# Patient Record
Sex: MALE | Race: WHITE | NOT HISPANIC OR LATINO
[De-identification: names, ages, dates, MRNs, and addresses within clinical notes are randomized per-mention and may not be internally consistent; named-entity substitution may affect disease eponyms.]

---

## 2017-02-27 ENCOUNTER — FORM ENCOUNTER (OUTPATIENT)
Age: 77
End: 2017-02-27

## 2017-02-28 ENCOUNTER — OUTPATIENT (OUTPATIENT)
Dept: OUTPATIENT SERVICES | Facility: HOSPITAL | Age: 77
LOS: 1 days | End: 2017-02-28
Payer: MEDICARE

## 2017-02-28 ENCOUNTER — APPOINTMENT (OUTPATIENT)
Dept: CT IMAGING | Facility: CLINIC | Age: 77
End: 2017-02-28

## 2017-02-28 DIAGNOSIS — Z00.8 ENCOUNTER FOR OTHER GENERAL EXAMINATION: ICD-10-CM

## 2017-02-28 PROCEDURE — 71260 CT THORAX DX C+: CPT

## 2017-03-03 ENCOUNTER — APPOINTMENT (OUTPATIENT)
Dept: HEMATOLOGY ONCOLOGY | Facility: CLINIC | Age: 77
End: 2017-03-03

## 2017-03-03 VITALS
HEART RATE: 73 BPM | BODY MASS INDEX: 26.21 KG/M2 | SYSTOLIC BLOOD PRESSURE: 146 MMHG | HEIGHT: 67 IN | TEMPERATURE: 97.7 F | WEIGHT: 167 LBS | DIASTOLIC BLOOD PRESSURE: 68 MMHG

## 2017-03-27 ENCOUNTER — FORM ENCOUNTER (OUTPATIENT)
Age: 77
End: 2017-03-27

## 2017-03-28 ENCOUNTER — OUTPATIENT (OUTPATIENT)
Dept: OUTPATIENT SERVICES | Facility: HOSPITAL | Age: 77
LOS: 1 days | End: 2017-03-28
Payer: MEDICARE

## 2017-03-28 ENCOUNTER — APPOINTMENT (OUTPATIENT)
Dept: NUCLEAR MEDICINE | Facility: CLINIC | Age: 77
End: 2017-03-28

## 2017-03-28 DIAGNOSIS — Z00.8 ENCOUNTER FOR OTHER GENERAL EXAMINATION: ICD-10-CM

## 2017-03-28 PROCEDURE — A9552: CPT

## 2017-03-28 PROCEDURE — 78815 PET IMAGE W/CT SKULL-THIGH: CPT

## 2017-06-27 ENCOUNTER — APPOINTMENT (OUTPATIENT)
Dept: MRI IMAGING | Facility: CLINIC | Age: 77
End: 2017-06-27

## 2017-06-27 ENCOUNTER — OUTPATIENT (OUTPATIENT)
Dept: OUTPATIENT SERVICES | Facility: HOSPITAL | Age: 77
LOS: 1 days | End: 2017-06-27
Payer: MEDICARE

## 2017-06-27 ENCOUNTER — APPOINTMENT (OUTPATIENT)
Dept: CT IMAGING | Facility: CLINIC | Age: 77
End: 2017-06-27

## 2017-06-27 DIAGNOSIS — C15.9 MALIGNANT NEOPLASM OF ESOPHAGUS, UNSPECIFIED: ICD-10-CM

## 2017-06-27 PROCEDURE — 82565 ASSAY OF CREATININE: CPT

## 2017-06-27 PROCEDURE — 71260 CT THORAX DX C+: CPT

## 2017-06-27 PROCEDURE — A9585: CPT

## 2017-06-27 PROCEDURE — 74183 MRI ABD W/O CNTR FLWD CNTR: CPT

## 2017-08-15 ENCOUNTER — OUTPATIENT (OUTPATIENT)
Dept: OUTPATIENT SERVICES | Facility: HOSPITAL | Age: 77
LOS: 1 days | End: 2017-08-15
Payer: MEDICARE

## 2017-08-15 ENCOUNTER — APPOINTMENT (OUTPATIENT)
Dept: CT IMAGING | Facility: CLINIC | Age: 77
End: 2017-08-15
Payer: MEDICARE

## 2017-08-15 DIAGNOSIS — Z00.8 ENCOUNTER FOR OTHER GENERAL EXAMINATION: ICD-10-CM

## 2017-08-15 PROCEDURE — 71250 CT THORAX DX C-: CPT

## 2017-08-15 PROCEDURE — 71250 CT THORAX DX C-: CPT | Mod: 26

## 2017-09-13 ENCOUNTER — LABORATORY RESULT (OUTPATIENT)
Age: 77
End: 2017-09-13

## 2017-09-13 ENCOUNTER — APPOINTMENT (OUTPATIENT)
Dept: HEMATOLOGY ONCOLOGY | Facility: CLINIC | Age: 77
End: 2017-09-13
Payer: MEDICARE

## 2017-09-13 VITALS
HEART RATE: 74 BPM | BODY MASS INDEX: 26.06 KG/M2 | TEMPERATURE: 97.8 F | HEIGHT: 67 IN | SYSTOLIC BLOOD PRESSURE: 122 MMHG | DIASTOLIC BLOOD PRESSURE: 70 MMHG | WEIGHT: 166 LBS

## 2017-09-13 LAB
HCT VFR BLD CALC: 35.5 %
HGB BLD-MCNC: 13.2 G/DL
MCHC RBC-ENTMCNC: 34.9 PG
MCHC RBC-ENTMCNC: 37.1 GM/DL
MCV RBC AUTO: 94.2 FL
PLATELET # BLD AUTO: 235 K/UL
RBC # BLD: 3.77 M/UL
RBC # FLD: 11.4 %
WBC # FLD AUTO: 7.3 K/UL

## 2017-09-13 PROCEDURE — 36415 COLL VENOUS BLD VENIPUNCTURE: CPT

## 2017-09-13 PROCEDURE — 99215 OFFICE O/P EST HI 40 MIN: CPT | Mod: 25

## 2017-09-13 PROCEDURE — 85025 COMPLETE CBC W/AUTO DIFF WBC: CPT

## 2017-09-13 RX ORDER — OXYCODONE AND ACETAMINOPHEN 5; 325 MG/1; MG/1
5-325 TABLET ORAL
Qty: 20 | Refills: 0 | Status: COMPLETED | COMMUNITY
Start: 2017-09-07

## 2017-09-19 PROBLEM — Z92.3 STATUS POST CHEMORADIATION: Status: RESOLVED | Noted: 2017-09-19 | Resolved: 2017-09-19

## 2017-09-19 PROBLEM — Z92.21 HISTORY OF CHEMOTHERAPY: Status: RESOLVED | Noted: 2017-09-19 | Resolved: 2017-09-19

## 2017-09-20 ENCOUNTER — OUTPATIENT (OUTPATIENT)
Dept: OUTPATIENT SERVICES | Facility: HOSPITAL | Age: 77
LOS: 1 days | Discharge: HOME | End: 2017-09-20
Payer: MEDICARE

## 2017-09-20 VITALS
SYSTOLIC BLOOD PRESSURE: 128 MMHG | DIASTOLIC BLOOD PRESSURE: 69 MMHG | RESPIRATION RATE: 16 BRPM | HEART RATE: 89 BPM | OXYGEN SATURATION: 100 %

## 2017-09-20 VITALS — WEIGHT: 156.09 LBS | HEIGHT: 67 IN | TEMPERATURE: 97 F | OXYGEN SATURATION: 100 %

## 2017-09-20 DIAGNOSIS — S62.101A FRACTURE OF UNSPECIFIED CARPAL BONE, RIGHT WRIST, INITIAL ENCOUNTER FOR CLOSED FRACTURE: Chronic | ICD-10-CM

## 2017-09-20 DIAGNOSIS — Z98.41 CATARACT EXTRACTION STATUS, RIGHT EYE: Chronic | ICD-10-CM

## 2017-09-20 DIAGNOSIS — Z87.19 PERSONAL HISTORY OF OTHER DISEASES OF THE DIGESTIVE SYSTEM: Chronic | ICD-10-CM

## 2017-09-20 DIAGNOSIS — Z85.118 PERSONAL HISTORY OF OTHER MALIGNANT NEOPLASM OF BRONCHUS AND LUNG: Chronic | ICD-10-CM

## 2017-09-20 DIAGNOSIS — Z98.890 OTHER SPECIFIED POSTPROCEDURAL STATES: Chronic | ICD-10-CM

## 2017-09-20 DIAGNOSIS — Z90.49 ACQUIRED ABSENCE OF OTHER SPECIFIED PARTS OF DIGESTIVE TRACT: Chronic | ICD-10-CM

## 2017-09-20 DIAGNOSIS — Z98.1 ARTHRODESIS STATUS: Chronic | ICD-10-CM

## 2017-09-20 DIAGNOSIS — Z90.89 ACQUIRED ABSENCE OF OTHER ORGANS: Chronic | ICD-10-CM

## 2017-09-20 LAB
ANION GAP SERPL CALC-SCNC: 4 MMOL/L — LOW (ref 5–17)
BUN SERPL-MCNC: 20 MG/DL — SIGNIFICANT CHANGE UP (ref 7–23)
CALCIUM SERPL-MCNC: 9.1 MG/DL — SIGNIFICANT CHANGE UP (ref 8.5–10.1)
CHLORIDE SERPL-SCNC: 106 MMOL/L — SIGNIFICANT CHANGE UP (ref 96–108)
CO2 SERPL-SCNC: 30 MMOL/L — SIGNIFICANT CHANGE UP (ref 22–31)
CREAT SERPL-MCNC: 0.92 MG/DL — SIGNIFICANT CHANGE UP (ref 0.5–1.3)
GLUCOSE SERPL-MCNC: 115 MG/DL — HIGH (ref 70–99)
INR BLD: 1.02 RATIO — SIGNIFICANT CHANGE UP (ref 0.88–1.16)
POTASSIUM SERPL-MCNC: 4.7 MMOL/L — SIGNIFICANT CHANGE UP (ref 3.5–5.3)
POTASSIUM SERPL-SCNC: 4.7 MMOL/L — SIGNIFICANT CHANGE UP (ref 3.5–5.3)
PROTHROM AB SERPL-ACNC: 11 SEC — SIGNIFICANT CHANGE UP (ref 9.8–12.7)
SODIUM SERPL-SCNC: 140 MMOL/L — SIGNIFICANT CHANGE UP (ref 135–145)

## 2017-09-20 PROCEDURE — 76937 US GUIDE VASCULAR ACCESS: CPT | Mod: 26

## 2017-09-20 PROCEDURE — 93010 ELECTROCARDIOGRAM REPORT: CPT

## 2017-09-20 PROCEDURE — 77001 FLUOROGUIDE FOR VEIN DEVICE: CPT | Mod: 26

## 2017-09-20 PROCEDURE — 36561 INSERT TUNNELED CV CATH: CPT | Mod: RT

## 2017-09-20 RX ORDER — FENTANYL CITRATE 50 UG/ML
25 INJECTION INTRAVENOUS
Qty: 0 | Refills: 0 | Status: DISCONTINUED | OUTPATIENT
Start: 2017-09-20 | End: 2017-09-20

## 2017-09-20 RX ORDER — ONDANSETRON 8 MG/1
4 TABLET, FILM COATED ORAL ONCE
Qty: 0 | Refills: 0 | Status: DISCONTINUED | OUTPATIENT
Start: 2017-09-20 | End: 2017-09-20

## 2017-09-20 RX ORDER — OXYCODONE AND ACETAMINOPHEN 5; 325 MG/1; MG/1
1 TABLET ORAL EVERY 4 HOURS
Qty: 0 | Refills: 0 | Status: DISCONTINUED | OUTPATIENT
Start: 2017-09-20 | End: 2017-09-20

## 2017-09-20 RX ORDER — SODIUM CHLORIDE 9 MG/ML
1000 INJECTION INTRAMUSCULAR; INTRAVENOUS; SUBCUTANEOUS
Qty: 0 | Refills: 0 | Status: DISCONTINUED | OUTPATIENT
Start: 2017-09-20 | End: 2017-09-20

## 2017-09-20 NOTE — ASU PATIENT PROFILE, ADULT - PMH
BPH (benign prostatic hyperplasia)    Esophageal carcinoma    GERD (gastroesophageal reflux disease)    HTN (hypertension)    Lung cancer

## 2017-09-20 NOTE — BRIEF OPERATIVE NOTE - OPERATION/FINDINGS
sono and fluoro. rij access. 6fr single lumen port placed with tip in svc. good blood return. no complications.

## 2017-09-20 NOTE — ASU PATIENT PROFILE, ADULT - PSH
H/O esophagectomy    H/O ventral hernia  umbilical  H/O: lung cancer  lung resection  History of bilateral cataract extraction    History of fusion of thoracic spine    History of shoulder surgery  bilateral rotator cuff  S/P tonsillectomy and adenoidectomy    Wrist fracture, bilateral

## 2017-09-20 NOTE — BRIEF OPERATIVE NOTE - PROCEDURE
<<-----Click on this checkbox to enter Procedure Insertion of catheter with port  09/20/2017    Active  [id number]

## 2017-09-25 ENCOUNTER — APPOINTMENT (OUTPATIENT)
Dept: HEMATOLOGY ONCOLOGY | Facility: CLINIC | Age: 77
End: 2017-09-25
Payer: MEDICARE

## 2017-09-25 ENCOUNTER — APPOINTMENT (OUTPATIENT)
Dept: INFUSION THERAPY | Facility: CLINIC | Age: 77
End: 2017-09-25
Payer: MEDICARE

## 2017-09-25 ENCOUNTER — LABORATORY RESULT (OUTPATIENT)
Age: 77
End: 2017-09-25

## 2017-09-25 VITALS
HEIGHT: 67 IN | SYSTOLIC BLOOD PRESSURE: 112 MMHG | WEIGHT: 166 LBS | TEMPERATURE: 98.1 F | DIASTOLIC BLOOD PRESSURE: 69 MMHG | BODY MASS INDEX: 26.06 KG/M2 | HEART RATE: 81 BPM

## 2017-09-25 DIAGNOSIS — Z92.21 PERSONAL HISTORY OF IRRADIATION: ICD-10-CM

## 2017-09-25 DIAGNOSIS — Z92.3 PERSONAL HISTORY OF IRRADIATION: ICD-10-CM

## 2017-09-25 DIAGNOSIS — Z92.21 PERSONAL HISTORY OF ANTINEOPLASTIC CHEMOTHERAPY: ICD-10-CM

## 2017-09-25 LAB
HCT VFR BLD CALC: 35.8 %
HGB BLD-MCNC: 12.9 G/DL
MCHC RBC-ENTMCNC: 33.5 PG
MCHC RBC-ENTMCNC: 36.1 GM/DL
MCV RBC AUTO: 92.6 FL
PLATELET # BLD AUTO: 248 K/UL
RBC # BLD: 3.86 M/UL
RBC # FLD: 11.5 %
WBC # FLD AUTO: 6.9 K/UL

## 2017-09-25 PROCEDURE — 36415 COLL VENOUS BLD VENIPUNCTURE: CPT

## 2017-09-25 PROCEDURE — 96415 CHEMO IV INFUSION ADDL HR: CPT

## 2017-09-25 PROCEDURE — 99215 OFFICE O/P EST HI 40 MIN: CPT | Mod: 25

## 2017-09-25 PROCEDURE — 96367 TX/PROPH/DG ADDL SEQ IV INF: CPT

## 2017-09-25 PROCEDURE — 96375 TX/PRO/DX INJ NEW DRUG ADDON: CPT

## 2017-09-25 PROCEDURE — 85025 COMPLETE CBC W/AUTO DIFF WBC: CPT

## 2017-09-25 PROCEDURE — 96372 THER/PROPH/DIAG INJ SC/IM: CPT | Mod: 59

## 2017-09-25 PROCEDURE — 96413 CHEMO IV INFUSION 1 HR: CPT

## 2017-09-29 LAB
ALBUMIN SERPL ELPH-MCNC: 3.9 G/DL
ALP BLD-CCNC: 87 U/L
ALT SERPL-CCNC: 14 U/L
ANION GAP SERPL CALC-SCNC: 13 MMOL/L
AST SERPL-CCNC: 18 U/L
BILIRUB SERPL-MCNC: 0.4 MG/DL
BUN SERPL-MCNC: 18 MG/DL
CALCIUM SERPL-MCNC: 9.2 MG/DL
CHLORIDE SERPL-SCNC: 101 MMOL/L
CO2 SERPL-SCNC: 25 MMOL/L
CREAT SERPL-MCNC: 0.93 MG/DL
FERRITIN SERPL-MCNC: 103 NG/ML
FOLATE SERPL-MCNC: 11.3 NG/ML
GLUCOSE SERPL-MCNC: 146 MG/DL
IRON SATN MFR SERPL: 31 %
IRON SERPL-MCNC: 80 UG/DL
POTASSIUM SERPL-SCNC: 4.9 MMOL/L
PROT SERPL-MCNC: 6.6 G/DL
SODIUM SERPL-SCNC: 139 MMOL/L
TIBC SERPL-MCNC: 257 UG/DL
UIBC SERPL-MCNC: 177 UG/DL
VIT B12 SERPL-MCNC: 404 PG/ML

## 2017-09-30 DIAGNOSIS — I48.0 PAROXYSMAL ATRIAL FIBRILLATION: ICD-10-CM

## 2017-09-30 DIAGNOSIS — N40.0 BENIGN PROSTATIC HYPERPLASIA WITHOUT LOWER URINARY TRACT SYMPTOMS: ICD-10-CM

## 2017-09-30 DIAGNOSIS — C15.9 MALIGNANT NEOPLASM OF ESOPHAGUS, UNSPECIFIED: ICD-10-CM

## 2017-09-30 DIAGNOSIS — I10 ESSENTIAL (PRIMARY) HYPERTENSION: ICD-10-CM

## 2017-09-30 DIAGNOSIS — K21.9 GASTRO-ESOPHAGEAL REFLUX DISEASE WITHOUT ESOPHAGITIS: ICD-10-CM

## 2017-09-30 DIAGNOSIS — Z87.891 PERSONAL HISTORY OF NICOTINE DEPENDENCE: ICD-10-CM

## 2017-10-10 ENCOUNTER — APPOINTMENT (OUTPATIENT)
Dept: HEMATOLOGY ONCOLOGY | Facility: CLINIC | Age: 77
End: 2017-10-10
Payer: MEDICARE

## 2017-10-10 ENCOUNTER — LABORATORY RESULT (OUTPATIENT)
Age: 77
End: 2017-10-10

## 2017-10-10 ENCOUNTER — APPOINTMENT (OUTPATIENT)
Dept: INFUSION THERAPY | Facility: CLINIC | Age: 77
End: 2017-10-10
Payer: MEDICARE

## 2017-10-10 VITALS
DIASTOLIC BLOOD PRESSURE: 70 MMHG | BODY MASS INDEX: 26.37 KG/M2 | TEMPERATURE: 97.9 F | HEART RATE: 78 BPM | SYSTOLIC BLOOD PRESSURE: 123 MMHG | WEIGHT: 168 LBS | HEIGHT: 67 IN

## 2017-10-10 LAB
HCT VFR BLD CALC: 34.2 %
HGB BLD-MCNC: 12.5 G/DL
MCHC RBC-ENTMCNC: 33.9 PG
MCHC RBC-ENTMCNC: 36.5 GM/DL
MCV RBC AUTO: 93 FL
PLATELET # BLD AUTO: 184 K/UL
RBC # BLD: 3.68 M/UL
RBC # FLD: 11.9 %
WBC # FLD AUTO: 6 K/UL

## 2017-10-10 PROCEDURE — 96413 CHEMO IV INFUSION 1 HR: CPT

## 2017-10-10 PROCEDURE — 85025 COMPLETE CBC W/AUTO DIFF WBC: CPT

## 2017-10-10 PROCEDURE — 96375 TX/PRO/DX INJ NEW DRUG ADDON: CPT

## 2017-10-10 PROCEDURE — 36415 COLL VENOUS BLD VENIPUNCTURE: CPT

## 2017-10-10 PROCEDURE — 96415 CHEMO IV INFUSION ADDL HR: CPT

## 2017-10-10 PROCEDURE — 96367 TX/PROPH/DG ADDL SEQ IV INF: CPT

## 2017-10-10 PROCEDURE — 99213 OFFICE O/P EST LOW 20 MIN: CPT | Mod: 25

## 2017-10-11 LAB
ALBUMIN SERPL ELPH-MCNC: 3.9 G/DL
ALP BLD-CCNC: 72 U/L
ALT SERPL-CCNC: 10 U/L
ANION GAP SERPL CALC-SCNC: 17 MMOL/L
AST SERPL-CCNC: 17 U/L
BILIRUB SERPL-MCNC: 0.2 MG/DL
BUN SERPL-MCNC: 20 MG/DL
CALCIUM SERPL-MCNC: 9.1 MG/DL
CHLORIDE SERPL-SCNC: 104 MMOL/L
CO2 SERPL-SCNC: 20 MMOL/L
CREAT SERPL-MCNC: 0.94 MG/DL
GLUCOSE SERPL-MCNC: 189 MG/DL
POTASSIUM SERPL-SCNC: 3.9 MMOL/L
PROT SERPL-MCNC: 6.1 G/DL
SODIUM SERPL-SCNC: 141 MMOL/L

## 2017-10-23 ENCOUNTER — LABORATORY RESULT (OUTPATIENT)
Age: 77
End: 2017-10-23

## 2017-10-23 ENCOUNTER — APPOINTMENT (OUTPATIENT)
Dept: INFUSION THERAPY | Facility: CLINIC | Age: 77
End: 2017-10-23
Payer: MEDICARE

## 2017-10-23 ENCOUNTER — APPOINTMENT (OUTPATIENT)
Dept: HEMATOLOGY ONCOLOGY | Facility: CLINIC | Age: 77
End: 2017-10-23
Payer: MEDICARE

## 2017-10-23 VITALS
SYSTOLIC BLOOD PRESSURE: 142 MMHG | TEMPERATURE: 97.5 F | HEART RATE: 69 BPM | BODY MASS INDEX: 26.37 KG/M2 | HEIGHT: 67 IN | WEIGHT: 168 LBS | DIASTOLIC BLOOD PRESSURE: 79 MMHG

## 2017-10-23 LAB
HCT VFR BLD CALC: 30 %
HGB BLD-MCNC: 11.3 G/DL
MCHC RBC-ENTMCNC: 35.2 PG
MCHC RBC-ENTMCNC: 37.6 GM/DL
MCV RBC AUTO: 93.5 FL
PLATELET # BLD AUTO: 146 K/UL
RBC # BLD: 3.21 M/UL
RBC # FLD: 12.9 %
WBC # FLD AUTO: 5.3 K/UL

## 2017-10-23 PROCEDURE — 96375 TX/PRO/DX INJ NEW DRUG ADDON: CPT

## 2017-10-23 PROCEDURE — 36415 COLL VENOUS BLD VENIPUNCTURE: CPT

## 2017-10-23 PROCEDURE — 96415 CHEMO IV INFUSION ADDL HR: CPT

## 2017-10-23 PROCEDURE — 96372 THER/PROPH/DIAG INJ SC/IM: CPT | Mod: 59

## 2017-10-23 PROCEDURE — 96367 TX/PROPH/DG ADDL SEQ IV INF: CPT

## 2017-10-23 PROCEDURE — 99214 OFFICE O/P EST MOD 30 MIN: CPT | Mod: 25

## 2017-10-23 PROCEDURE — 85025 COMPLETE CBC W/AUTO DIFF WBC: CPT

## 2017-10-23 PROCEDURE — 96413 CHEMO IV INFUSION 1 HR: CPT

## 2017-10-24 LAB — CEA SERPL-MCNC: 2.3 NG/ML

## 2017-11-04 ENCOUNTER — APPOINTMENT (OUTPATIENT)
Dept: ULTRASOUND IMAGING | Facility: CLINIC | Age: 77
End: 2017-11-04

## 2017-11-08 ENCOUNTER — APPOINTMENT (OUTPATIENT)
Dept: INFUSION THERAPY | Facility: CLINIC | Age: 77
End: 2017-11-08
Payer: MEDICARE

## 2017-11-08 ENCOUNTER — LABORATORY RESULT (OUTPATIENT)
Age: 77
End: 2017-11-08

## 2017-11-08 VITALS
HEIGHT: 67 IN | HEART RATE: 77 BPM | DIASTOLIC BLOOD PRESSURE: 66 MMHG | TEMPERATURE: 98 F | SYSTOLIC BLOOD PRESSURE: 119 MMHG | WEIGHT: 163.5 LBS | BODY MASS INDEX: 25.66 KG/M2

## 2017-11-08 LAB
HCT VFR BLD CALC: 33.1 %
HGB BLD-MCNC: 12 G/DL
MCHC RBC-ENTMCNC: 35.3 PG
MCHC RBC-ENTMCNC: 36.2 GM/DL
MCV RBC AUTO: 97.4 FL
PLATELET # BLD AUTO: 129 K/UL
RBC # BLD: 3.4 M/UL
RBC # FLD: 15.3 %
WBC # FLD AUTO: 3.7 K/UL

## 2017-11-08 PROCEDURE — 96367 TX/PROPH/DG ADDL SEQ IV INF: CPT

## 2017-11-08 PROCEDURE — 96415 CHEMO IV INFUSION ADDL HR: CPT

## 2017-11-08 PROCEDURE — 96375 TX/PRO/DX INJ NEW DRUG ADDON: CPT

## 2017-11-08 PROCEDURE — 85025 COMPLETE CBC W/AUTO DIFF WBC: CPT

## 2017-11-08 PROCEDURE — 36415 COLL VENOUS BLD VENIPUNCTURE: CPT

## 2017-11-08 PROCEDURE — 96413 CHEMO IV INFUSION 1 HR: CPT

## 2017-11-09 LAB
ALBUMIN SERPL ELPH-MCNC: 4 G/DL
ALP BLD-CCNC: 71 U/L
ALT SERPL-CCNC: 19 U/L
ANION GAP SERPL CALC-SCNC: 16 MMOL/L
AST SERPL-CCNC: 27 U/L
BILIRUB SERPL-MCNC: 0.3 MG/DL
BUN SERPL-MCNC: 17 MG/DL
CALCIUM SERPL-MCNC: 8.9 MG/DL
CHLORIDE SERPL-SCNC: 102 MMOL/L
CO2 SERPL-SCNC: 23 MMOL/L
CREAT SERPL-MCNC: 0.84 MG/DL
GLUCOSE SERPL-MCNC: 164 MG/DL
POTASSIUM SERPL-SCNC: 4.6 MMOL/L
PROT SERPL-MCNC: 6.5 G/DL
SODIUM SERPL-SCNC: 141 MMOL/L

## 2017-11-13 ENCOUNTER — APPOINTMENT (OUTPATIENT)
Dept: ULTRASOUND IMAGING | Facility: CLINIC | Age: 77
End: 2017-11-13
Payer: MEDICARE

## 2017-11-13 ENCOUNTER — OUTPATIENT (OUTPATIENT)
Dept: OUTPATIENT SERVICES | Facility: HOSPITAL | Age: 77
LOS: 1 days | End: 2017-11-13
Payer: MEDICARE

## 2017-11-13 DIAGNOSIS — Z90.89 ACQUIRED ABSENCE OF OTHER ORGANS: Chronic | ICD-10-CM

## 2017-11-13 DIAGNOSIS — Z90.49 ACQUIRED ABSENCE OF OTHER SPECIFIED PARTS OF DIGESTIVE TRACT: Chronic | ICD-10-CM

## 2017-11-13 DIAGNOSIS — Z98.1 ARTHRODESIS STATUS: Chronic | ICD-10-CM

## 2017-11-13 DIAGNOSIS — Z98.890 OTHER SPECIFIED POSTPROCEDURAL STATES: Chronic | ICD-10-CM

## 2017-11-13 DIAGNOSIS — Z00.8 ENCOUNTER FOR OTHER GENERAL EXAMINATION: ICD-10-CM

## 2017-11-13 DIAGNOSIS — Z87.19 PERSONAL HISTORY OF OTHER DISEASES OF THE DIGESTIVE SYSTEM: Chronic | ICD-10-CM

## 2017-11-13 DIAGNOSIS — S62.101A FRACTURE OF UNSPECIFIED CARPAL BONE, RIGHT WRIST, INITIAL ENCOUNTER FOR CLOSED FRACTURE: Chronic | ICD-10-CM

## 2017-11-13 DIAGNOSIS — Z98.41 CATARACT EXTRACTION STATUS, RIGHT EYE: Chronic | ICD-10-CM

## 2017-11-13 DIAGNOSIS — Z85.118 PERSONAL HISTORY OF OTHER MALIGNANT NEOPLASM OF BRONCHUS AND LUNG: Chronic | ICD-10-CM

## 2017-11-13 PROCEDURE — 76536 US EXAM OF HEAD AND NECK: CPT

## 2017-11-13 PROCEDURE — 76536 US EXAM OF HEAD AND NECK: CPT | Mod: 26

## 2017-11-21 ENCOUNTER — APPOINTMENT (OUTPATIENT)
Dept: HEMATOLOGY ONCOLOGY | Facility: CLINIC | Age: 77
End: 2017-11-21
Payer: MEDICARE

## 2017-11-21 ENCOUNTER — APPOINTMENT (OUTPATIENT)
Dept: INFUSION THERAPY | Facility: CLINIC | Age: 77
End: 2017-11-21
Payer: MEDICARE

## 2017-11-21 ENCOUNTER — LABORATORY RESULT (OUTPATIENT)
Age: 77
End: 2017-11-21

## 2017-11-21 VITALS
HEIGHT: 67 IN | DIASTOLIC BLOOD PRESSURE: 87 MMHG | WEIGHT: 167 LBS | HEART RATE: 74 BPM | TEMPERATURE: 97.8 F | SYSTOLIC BLOOD PRESSURE: 156 MMHG | BODY MASS INDEX: 26.21 KG/M2

## 2017-11-21 LAB
HCT VFR BLD CALC: 31.8 %
HGB BLD-MCNC: 11.4 G/DL
MCHC RBC-ENTMCNC: 35.7 GM/DL
MCHC RBC-ENTMCNC: 35.7 PG
MCV RBC AUTO: 99.9 FL
PLATELET # BLD AUTO: 129 K/UL
RBC # BLD: 3.19 M/UL
RBC # FLD: 15.7 %
WBC # FLD AUTO: 3.9 K/UL

## 2017-11-21 PROCEDURE — 96367 TX/PROPH/DG ADDL SEQ IV INF: CPT

## 2017-11-21 PROCEDURE — 96415 CHEMO IV INFUSION ADDL HR: CPT

## 2017-11-21 PROCEDURE — 85025 COMPLETE CBC W/AUTO DIFF WBC: CPT

## 2017-11-21 PROCEDURE — 96375 TX/PRO/DX INJ NEW DRUG ADDON: CPT

## 2017-11-21 PROCEDURE — 36415 COLL VENOUS BLD VENIPUNCTURE: CPT

## 2017-11-21 PROCEDURE — 96372 THER/PROPH/DIAG INJ SC/IM: CPT | Mod: 59

## 2017-11-21 PROCEDURE — 99214 OFFICE O/P EST MOD 30 MIN: CPT | Mod: 25

## 2017-11-21 PROCEDURE — 96413 CHEMO IV INFUSION 1 HR: CPT

## 2017-12-05 ENCOUNTER — APPOINTMENT (OUTPATIENT)
Dept: INFUSION THERAPY | Facility: CLINIC | Age: 77
End: 2017-12-05
Payer: MEDICARE

## 2017-12-05 ENCOUNTER — LABORATORY RESULT (OUTPATIENT)
Age: 77
End: 2017-12-05

## 2017-12-05 VITALS
SYSTOLIC BLOOD PRESSURE: 123 MMHG | TEMPERATURE: 97.9 F | BODY MASS INDEX: 26.06 KG/M2 | DIASTOLIC BLOOD PRESSURE: 68 MMHG | HEIGHT: 67 IN | WEIGHT: 166 LBS | HEART RATE: 80 BPM

## 2017-12-05 LAB
HCT VFR BLD CALC: 32.2 %
HGB BLD-MCNC: 11.8 G/DL
MCHC RBC-ENTMCNC: 36.6 GM/DL
MCHC RBC-ENTMCNC: 36.8 PG
MCV RBC AUTO: 101 FL
PLATELET # BLD AUTO: 116 K/UL
RBC # BLD: 3.21 M/UL
RBC # FLD: 17 %
WBC # FLD AUTO: 3.6 K/UL

## 2017-12-05 PROCEDURE — 36415 COLL VENOUS BLD VENIPUNCTURE: CPT

## 2017-12-05 PROCEDURE — 96365 THER/PROPH/DIAG IV INF INIT: CPT

## 2017-12-05 PROCEDURE — 85025 COMPLETE CBC W/AUTO DIFF WBC: CPT

## 2017-12-05 PROCEDURE — 96375 TX/PRO/DX INJ NEW DRUG ADDON: CPT

## 2017-12-07 LAB
ALBUMIN SERPL ELPH-MCNC: 4 G/DL
ALP BLD-CCNC: 75 U/L
ALT SERPL-CCNC: 22 U/L
ANION GAP SERPL CALC-SCNC: 12 MMOL/L
AST SERPL-CCNC: 31 U/L
BILIRUB SERPL-MCNC: 0.2 MG/DL
BUN SERPL-MCNC: 19 MG/DL
CALCIUM SERPL-MCNC: 9 MG/DL
CHLORIDE SERPL-SCNC: 102 MMOL/L
CO2 SERPL-SCNC: 25 MMOL/L
CREAT SERPL-MCNC: 0.8 MG/DL
GLUCOSE SERPL-MCNC: 106 MG/DL
POTASSIUM SERPL-SCNC: 4.5 MMOL/L
PROT SERPL-MCNC: 6.5 G/DL
SODIUM SERPL-SCNC: 139 MMOL/L

## 2017-12-19 ENCOUNTER — APPOINTMENT (OUTPATIENT)
Dept: INFUSION THERAPY | Facility: CLINIC | Age: 77
End: 2017-12-19

## 2018-01-02 ENCOUNTER — APPOINTMENT (OUTPATIENT)
Dept: HEMATOLOGY ONCOLOGY | Facility: CLINIC | Age: 78
End: 2018-01-02
Payer: MEDICARE

## 2018-01-02 ENCOUNTER — APPOINTMENT (OUTPATIENT)
Dept: INFUSION THERAPY | Facility: CLINIC | Age: 78
End: 2018-01-02

## 2018-01-02 ENCOUNTER — LABORATORY RESULT (OUTPATIENT)
Age: 78
End: 2018-01-02

## 2018-01-02 VITALS
HEART RATE: 73 BPM | WEIGHT: 164 LBS | HEIGHT: 67 IN | DIASTOLIC BLOOD PRESSURE: 69 MMHG | TEMPERATURE: 97.8 F | SYSTOLIC BLOOD PRESSURE: 127 MMHG | BODY MASS INDEX: 25.74 KG/M2

## 2018-01-02 LAB
HCT VFR BLD CALC: 35 %
HGB BLD-MCNC: 12.4 G/DL
MCHC RBC-ENTMCNC: 35.3 GM/DL
MCHC RBC-ENTMCNC: 38.1 PG
MCV RBC AUTO: 108 FL
PLATELET # BLD AUTO: 161 K/UL
RBC # BLD: 3.24 M/UL
RBC # FLD: 16.9 %
WBC # FLD AUTO: 4 K/UL

## 2018-01-02 PROCEDURE — 36415 COLL VENOUS BLD VENIPUNCTURE: CPT

## 2018-01-02 PROCEDURE — 99214 OFFICE O/P EST MOD 30 MIN: CPT | Mod: 25

## 2018-01-02 PROCEDURE — 85025 COMPLETE CBC W/AUTO DIFF WBC: CPT

## 2018-01-03 LAB
ALBUMIN SERPL ELPH-MCNC: 4.3 G/DL
ALP BLD-CCNC: 83 U/L
ALT SERPL-CCNC: 17 U/L
ANION GAP SERPL CALC-SCNC: 14 MMOL/L
AST SERPL-CCNC: 26 U/L
BILIRUB SERPL-MCNC: 0.4 MG/DL
BUN SERPL-MCNC: 20 MG/DL
CALCIUM SERPL-MCNC: 9.1 MG/DL
CHLORIDE SERPL-SCNC: 105 MMOL/L
CO2 SERPL-SCNC: 22 MMOL/L
CREAT SERPL-MCNC: 0.91 MG/DL
GLUCOSE SERPL-MCNC: 114 MG/DL
POTASSIUM SERPL-SCNC: 4.1 MMOL/L
PROT SERPL-MCNC: 6.5 G/DL
SODIUM SERPL-SCNC: 141 MMOL/L

## 2018-02-25 ENCOUNTER — FORM ENCOUNTER (OUTPATIENT)
Age: 78
End: 2018-02-25

## 2018-02-26 ENCOUNTER — APPOINTMENT (OUTPATIENT)
Dept: CT IMAGING | Facility: CLINIC | Age: 78
End: 2018-02-26
Payer: MEDICARE

## 2018-02-26 ENCOUNTER — OUTPATIENT (OUTPATIENT)
Dept: OUTPATIENT SERVICES | Facility: HOSPITAL | Age: 78
LOS: 1 days | End: 2018-02-26
Payer: MEDICARE

## 2018-02-26 DIAGNOSIS — Z87.19 PERSONAL HISTORY OF OTHER DISEASES OF THE DIGESTIVE SYSTEM: Chronic | ICD-10-CM

## 2018-02-26 DIAGNOSIS — Z98.1 ARTHRODESIS STATUS: Chronic | ICD-10-CM

## 2018-02-26 DIAGNOSIS — S62.101A FRACTURE OF UNSPECIFIED CARPAL BONE, RIGHT WRIST, INITIAL ENCOUNTER FOR CLOSED FRACTURE: Chronic | ICD-10-CM

## 2018-02-26 DIAGNOSIS — Z90.89 ACQUIRED ABSENCE OF OTHER ORGANS: Chronic | ICD-10-CM

## 2018-02-26 DIAGNOSIS — Z85.118 PERSONAL HISTORY OF OTHER MALIGNANT NEOPLASM OF BRONCHUS AND LUNG: Chronic | ICD-10-CM

## 2018-02-26 DIAGNOSIS — Z00.8 ENCOUNTER FOR OTHER GENERAL EXAMINATION: ICD-10-CM

## 2018-02-26 DIAGNOSIS — Z90.49 ACQUIRED ABSENCE OF OTHER SPECIFIED PARTS OF DIGESTIVE TRACT: Chronic | ICD-10-CM

## 2018-02-26 DIAGNOSIS — Z98.890 OTHER SPECIFIED POSTPROCEDURAL STATES: Chronic | ICD-10-CM

## 2018-02-26 DIAGNOSIS — Z98.41 CATARACT EXTRACTION STATUS, RIGHT EYE: Chronic | ICD-10-CM

## 2018-02-26 PROCEDURE — 71260 CT THORAX DX C+: CPT

## 2018-02-26 PROCEDURE — 74177 CT ABD & PELVIS W/CONTRAST: CPT

## 2018-02-26 PROCEDURE — 71260 CT THORAX DX C+: CPT | Mod: 26

## 2018-02-26 PROCEDURE — 74177 CT ABD & PELVIS W/CONTRAST: CPT | Mod: 26

## 2018-02-26 PROCEDURE — 82565 ASSAY OF CREATININE: CPT

## 2018-03-02 ENCOUNTER — LABORATORY RESULT (OUTPATIENT)
Age: 78
End: 2018-03-02

## 2018-03-02 ENCOUNTER — APPOINTMENT (OUTPATIENT)
Dept: INFUSION THERAPY | Facility: CLINIC | Age: 78
End: 2018-03-02
Payer: MEDICARE

## 2018-03-02 ENCOUNTER — APPOINTMENT (OUTPATIENT)
Dept: HEMATOLOGY ONCOLOGY | Facility: CLINIC | Age: 78
End: 2018-03-02
Payer: MEDICARE

## 2018-03-02 VITALS
TEMPERATURE: 97.4 F | DIASTOLIC BLOOD PRESSURE: 75 MMHG | HEIGHT: 67 IN | BODY MASS INDEX: 24.17 KG/M2 | WEIGHT: 154 LBS | HEART RATE: 59 BPM | SYSTOLIC BLOOD PRESSURE: 130 MMHG

## 2018-03-02 DIAGNOSIS — Z79.899 OTHER LONG TERM (CURRENT) DRUG THERAPY: ICD-10-CM

## 2018-03-02 LAB
ALBUMIN SERPL ELPH-MCNC: 4.6 G/DL
ALP BLD-CCNC: 79 U/L
ALT SERPL-CCNC: 12 U/L
ANION GAP SERPL CALC-SCNC: 12 MMOL/L
AST SERPL-CCNC: 24 U/L
BILIRUB SERPL-MCNC: 0.5 MG/DL
BUN SERPL-MCNC: 24 MG/DL
CALCIUM SERPL-MCNC: 10 MG/DL
CHLORIDE SERPL-SCNC: 100 MMOL/L
CO2 SERPL-SCNC: 27 MMOL/L
CREAT SERPL-MCNC: 1.1 MG/DL
GLUCOSE SERPL-MCNC: 125 MG/DL
HCT VFR BLD CALC: 37.7 %
HGB BLD-MCNC: 13.2 G/DL
MCHC RBC-ENTMCNC: 35 GM/DL
MCHC RBC-ENTMCNC: 39.1 PG
MCV RBC AUTO: 112 FL
PLATELET # BLD AUTO: 145 K/UL
POTASSIUM SERPL-SCNC: 5.4 MMOL/L
PROT SERPL-MCNC: 7.1 G/DL
RBC # BLD: 3.38 M/UL
RBC # FLD: 10.7 %
SODIUM SERPL-SCNC: 139 MMOL/L
WBC # FLD AUTO: 5 K/UL

## 2018-03-02 PROCEDURE — 99213 OFFICE O/P EST LOW 20 MIN: CPT | Mod: 25

## 2018-03-02 PROCEDURE — 36415 COLL VENOUS BLD VENIPUNCTURE: CPT

## 2018-03-02 PROCEDURE — 85025 COMPLETE CBC W/AUTO DIFF WBC: CPT

## 2018-03-02 RX ORDER — SODIUM FLUORIDE 6 MG/ML
1.1 PASTE, DENTIFRICE DENTAL
Qty: 100 | Refills: 0 | Status: DISCONTINUED | COMMUNITY
Start: 2017-11-21

## 2018-03-02 RX ORDER — MUPIROCIN 20 MG/G
2 OINTMENT TOPICAL
Qty: 22 | Refills: 0 | Status: DISCONTINUED | COMMUNITY
Start: 2017-11-30

## 2018-03-02 RX ORDER — CAPECITABINE 500 MG/1
500 TABLET ORAL
Qty: 240 | Refills: 1 | Status: DISCONTINUED | COMMUNITY
Start: 2017-09-13 | End: 2018-03-02

## 2018-03-02 RX ORDER — SODIUM SULFATE, POTASSIUM SULFATE, MAGNESIUM SULFATE 17.5; 3.13; 1.6 G/ML; G/ML; G/ML
17.5-3.13-1.6 SOLUTION, CONCENTRATE ORAL
Qty: 354 | Refills: 0 | Status: DISCONTINUED | COMMUNITY
Start: 2017-12-14

## 2018-03-02 RX ORDER — AMOXICILLIN 500 MG/1
500 CAPSULE ORAL
Qty: 28 | Refills: 0 | Status: DISCONTINUED | COMMUNITY
Start: 2017-12-07

## 2018-03-23 ENCOUNTER — RESULT REVIEW (OUTPATIENT)
Age: 78
End: 2018-03-23

## 2018-05-04 ENCOUNTER — APPOINTMENT (OUTPATIENT)
Dept: HEMATOLOGY ONCOLOGY | Facility: CLINIC | Age: 78
End: 2018-05-04
Payer: MEDICARE

## 2018-05-04 ENCOUNTER — APPOINTMENT (OUTPATIENT)
Dept: INFUSION THERAPY | Facility: CLINIC | Age: 78
End: 2018-05-04
Payer: MEDICARE

## 2018-05-04 VITALS
TEMPERATURE: 97.8 F | SYSTOLIC BLOOD PRESSURE: 118 MMHG | BODY MASS INDEX: 24.64 KG/M2 | DIASTOLIC BLOOD PRESSURE: 63 MMHG | WEIGHT: 157 LBS | HEART RATE: 70 BPM | HEIGHT: 67 IN

## 2018-05-04 PROCEDURE — 99214 OFFICE O/P EST MOD 30 MIN: CPT

## 2018-05-04 RX ORDER — OMEPRAZOLE 20 MG/1
20 CAPSULE, DELAYED RELEASE ORAL DAILY
Refills: 0 | Status: ACTIVE | COMMUNITY
Start: 2018-05-04

## 2018-06-21 ENCOUNTER — OUTPATIENT (OUTPATIENT)
Dept: OUTPATIENT SERVICES | Facility: HOSPITAL | Age: 78
LOS: 1 days | End: 2018-06-21
Payer: MEDICARE

## 2018-06-21 ENCOUNTER — APPOINTMENT (OUTPATIENT)
Dept: MRI IMAGING | Facility: CLINIC | Age: 78
End: 2018-06-21
Payer: MEDICARE

## 2018-06-21 DIAGNOSIS — Z98.41 CATARACT EXTRACTION STATUS, RIGHT EYE: Chronic | ICD-10-CM

## 2018-06-21 DIAGNOSIS — Z90.89 ACQUIRED ABSENCE OF OTHER ORGANS: Chronic | ICD-10-CM

## 2018-06-21 DIAGNOSIS — Z00.8 ENCOUNTER FOR OTHER GENERAL EXAMINATION: ICD-10-CM

## 2018-06-21 DIAGNOSIS — Z90.49 ACQUIRED ABSENCE OF OTHER SPECIFIED PARTS OF DIGESTIVE TRACT: Chronic | ICD-10-CM

## 2018-06-21 DIAGNOSIS — Z98.1 ARTHRODESIS STATUS: Chronic | ICD-10-CM

## 2018-06-21 DIAGNOSIS — Z85.118 PERSONAL HISTORY OF OTHER MALIGNANT NEOPLASM OF BRONCHUS AND LUNG: Chronic | ICD-10-CM

## 2018-06-21 DIAGNOSIS — S62.101A FRACTURE OF UNSPECIFIED CARPAL BONE, RIGHT WRIST, INITIAL ENCOUNTER FOR CLOSED FRACTURE: Chronic | ICD-10-CM

## 2018-06-21 DIAGNOSIS — Z87.19 PERSONAL HISTORY OF OTHER DISEASES OF THE DIGESTIVE SYSTEM: Chronic | ICD-10-CM

## 2018-06-21 DIAGNOSIS — Z98.890 OTHER SPECIFIED POSTPROCEDURAL STATES: Chronic | ICD-10-CM

## 2018-06-21 PROCEDURE — 73221 MRI JOINT UPR EXTREM W/O DYE: CPT | Mod: 26,76,RT

## 2018-06-21 PROCEDURE — 72141 MRI NECK SPINE W/O DYE: CPT | Mod: 26

## 2018-06-21 PROCEDURE — 73221 MRI JOINT UPR EXTREM W/O DYE: CPT

## 2018-06-21 PROCEDURE — 72141 MRI NECK SPINE W/O DYE: CPT

## 2018-06-25 ENCOUNTER — APPOINTMENT (OUTPATIENT)
Dept: CT IMAGING | Facility: CLINIC | Age: 78
End: 2018-06-25
Payer: MEDICARE

## 2018-06-25 ENCOUNTER — OUTPATIENT (OUTPATIENT)
Dept: OUTPATIENT SERVICES | Facility: HOSPITAL | Age: 78
LOS: 1 days | End: 2018-06-25
Payer: MEDICARE

## 2018-06-25 ENCOUNTER — APPOINTMENT (OUTPATIENT)
Dept: MRI IMAGING | Facility: CLINIC | Age: 78
End: 2018-06-25
Payer: MEDICARE

## 2018-06-25 DIAGNOSIS — Z87.19 PERSONAL HISTORY OF OTHER DISEASES OF THE DIGESTIVE SYSTEM: Chronic | ICD-10-CM

## 2018-06-25 DIAGNOSIS — Z98.890 OTHER SPECIFIED POSTPROCEDURAL STATES: Chronic | ICD-10-CM

## 2018-06-25 DIAGNOSIS — Z98.41 CATARACT EXTRACTION STATUS, RIGHT EYE: Chronic | ICD-10-CM

## 2018-06-25 DIAGNOSIS — Z90.49 ACQUIRED ABSENCE OF OTHER SPECIFIED PARTS OF DIGESTIVE TRACT: Chronic | ICD-10-CM

## 2018-06-25 DIAGNOSIS — Z85.118 PERSONAL HISTORY OF OTHER MALIGNANT NEOPLASM OF BRONCHUS AND LUNG: Chronic | ICD-10-CM

## 2018-06-25 DIAGNOSIS — Z90.89 ACQUIRED ABSENCE OF OTHER ORGANS: Chronic | ICD-10-CM

## 2018-06-25 DIAGNOSIS — Z00.8 ENCOUNTER FOR OTHER GENERAL EXAMINATION: ICD-10-CM

## 2018-06-25 DIAGNOSIS — S62.101A FRACTURE OF UNSPECIFIED CARPAL BONE, RIGHT WRIST, INITIAL ENCOUNTER FOR CLOSED FRACTURE: Chronic | ICD-10-CM

## 2018-06-25 DIAGNOSIS — Z98.1 ARTHRODESIS STATUS: Chronic | ICD-10-CM

## 2018-06-25 PROCEDURE — 72131 CT LUMBAR SPINE W/O DYE: CPT

## 2018-06-25 PROCEDURE — 72131 CT LUMBAR SPINE W/O DYE: CPT | Mod: 26

## 2018-06-25 PROCEDURE — 76376 3D RENDER W/INTRP POSTPROCES: CPT | Mod: 26

## 2018-06-25 PROCEDURE — 72148 MRI LUMBAR SPINE W/O DYE: CPT | Mod: 26

## 2018-06-25 PROCEDURE — 76376 3D RENDER W/INTRP POSTPROCES: CPT

## 2018-06-25 PROCEDURE — 72148 MRI LUMBAR SPINE W/O DYE: CPT

## 2018-07-06 ENCOUNTER — LABORATORY RESULT (OUTPATIENT)
Age: 78
End: 2018-07-06

## 2018-07-06 ENCOUNTER — APPOINTMENT (OUTPATIENT)
Dept: INFUSION THERAPY | Facility: CLINIC | Age: 78
End: 2018-07-06
Payer: MEDICARE

## 2018-07-06 VITALS
WEIGHT: 161 LBS | SYSTOLIC BLOOD PRESSURE: 137 MMHG | BODY MASS INDEX: 25.27 KG/M2 | DIASTOLIC BLOOD PRESSURE: 80 MMHG | HEART RATE: 64 BPM | HEIGHT: 67 IN | TEMPERATURE: 98.3 F

## 2018-07-06 LAB
ALBUMIN SERPL ELPH-MCNC: 4.5 G/DL
ALP BLD-CCNC: 71 U/L
ALT SERPL-CCNC: 13 U/L
ANION GAP SERPL CALC-SCNC: 12 MMOL/L
AST SERPL-CCNC: 19 U/L
BILIRUB SERPL-MCNC: 0.4 MG/DL
BUN SERPL-MCNC: 27 MG/DL
CALCIUM SERPL-MCNC: 9.4 MG/DL
CHLORIDE SERPL-SCNC: 104 MMOL/L
CO2 SERPL-SCNC: 25 MMOL/L
CREAT SERPL-MCNC: 0.95 MG/DL
GLUCOSE SERPL-MCNC: 104 MG/DL
HCT VFR BLD CALC: 39.5 %
HGB BLD-MCNC: 13.6 G/DL
MCHC RBC-ENTMCNC: 33.2 PG
MCHC RBC-ENTMCNC: 34.5 GM/DL
MCV RBC AUTO: 96.4 FL
PLATELET # BLD AUTO: 187 K/UL
POTASSIUM SERPL-SCNC: 5 MMOL/L
PROT SERPL-MCNC: 6.8 G/DL
RBC # BLD: 4.1 M/UL
RBC # FLD: 10.9 %
SODIUM SERPL-SCNC: 141 MMOL/L
WBC # FLD AUTO: 5.1 K/UL

## 2018-07-06 PROCEDURE — 96523 IRRIG DRUG DELIVERY DEVICE: CPT

## 2018-07-09 LAB
CEA SERPL-MCNC: 2.5 NG/ML
VIT B12 SERPL-MCNC: 1050 PG/ML

## 2018-08-26 ENCOUNTER — FORM ENCOUNTER (OUTPATIENT)
Age: 78
End: 2018-08-26

## 2018-08-27 ENCOUNTER — APPOINTMENT (OUTPATIENT)
Dept: CT IMAGING | Facility: CLINIC | Age: 78
End: 2018-08-27
Payer: MEDICARE

## 2018-08-27 ENCOUNTER — OUTPATIENT (OUTPATIENT)
Dept: OUTPATIENT SERVICES | Facility: HOSPITAL | Age: 78
LOS: 1 days | End: 2018-08-27
Payer: MEDICARE

## 2018-08-27 DIAGNOSIS — S62.101A FRACTURE OF UNSPECIFIED CARPAL BONE, RIGHT WRIST, INITIAL ENCOUNTER FOR CLOSED FRACTURE: Chronic | ICD-10-CM

## 2018-08-27 DIAGNOSIS — Z98.1 ARTHRODESIS STATUS: Chronic | ICD-10-CM

## 2018-08-27 DIAGNOSIS — Z98.41 CATARACT EXTRACTION STATUS, RIGHT EYE: Chronic | ICD-10-CM

## 2018-08-27 DIAGNOSIS — Z90.49 ACQUIRED ABSENCE OF OTHER SPECIFIED PARTS OF DIGESTIVE TRACT: Chronic | ICD-10-CM

## 2018-08-27 DIAGNOSIS — Z87.19 PERSONAL HISTORY OF OTHER DISEASES OF THE DIGESTIVE SYSTEM: Chronic | ICD-10-CM

## 2018-08-27 DIAGNOSIS — Z90.89 ACQUIRED ABSENCE OF OTHER ORGANS: Chronic | ICD-10-CM

## 2018-08-27 DIAGNOSIS — Z85.118 PERSONAL HISTORY OF OTHER MALIGNANT NEOPLASM OF BRONCHUS AND LUNG: Chronic | ICD-10-CM

## 2018-08-27 DIAGNOSIS — Z00.8 ENCOUNTER FOR OTHER GENERAL EXAMINATION: ICD-10-CM

## 2018-08-27 DIAGNOSIS — Z98.890 OTHER SPECIFIED POSTPROCEDURAL STATES: Chronic | ICD-10-CM

## 2018-08-27 PROBLEM — N40.0 BENIGN PROSTATIC HYPERPLASIA WITHOUT LOWER URINARY TRACT SYMPTOMS: Chronic | Status: ACTIVE | Noted: 2017-09-20

## 2018-08-27 PROBLEM — K21.9 GASTRO-ESOPHAGEAL REFLUX DISEASE WITHOUT ESOPHAGITIS: Chronic | Status: ACTIVE | Noted: 2017-09-20

## 2018-08-27 PROBLEM — I10 ESSENTIAL (PRIMARY) HYPERTENSION: Chronic | Status: ACTIVE | Noted: 2017-09-20

## 2018-08-27 PROCEDURE — 71260 CT THORAX DX C+: CPT

## 2018-08-27 PROCEDURE — 71260 CT THORAX DX C+: CPT | Mod: 26

## 2018-08-27 PROCEDURE — 74177 CT ABD & PELVIS W/CONTRAST: CPT

## 2018-08-27 PROCEDURE — 82565 ASSAY OF CREATININE: CPT

## 2018-08-27 PROCEDURE — 74177 CT ABD & PELVIS W/CONTRAST: CPT | Mod: 26

## 2018-09-07 ENCOUNTER — APPOINTMENT (OUTPATIENT)
Dept: INFUSION THERAPY | Facility: CLINIC | Age: 78
End: 2018-09-07
Payer: MEDICARE

## 2018-09-07 ENCOUNTER — APPOINTMENT (OUTPATIENT)
Dept: HEMATOLOGY ONCOLOGY | Facility: CLINIC | Age: 78
End: 2018-09-07
Payer: MEDICARE

## 2018-09-07 VITALS
HEIGHT: 67 IN | BODY MASS INDEX: 25.11 KG/M2 | TEMPERATURE: 97.7 F | WEIGHT: 160 LBS | DIASTOLIC BLOOD PRESSURE: 70 MMHG | SYSTOLIC BLOOD PRESSURE: 114 MMHG | HEART RATE: 73 BPM

## 2018-09-07 DIAGNOSIS — R79.89 OTHER SPECIFIED ABNORMAL FINDINGS OF BLOOD CHEMISTRY: ICD-10-CM

## 2018-09-07 PROCEDURE — 99214 OFFICE O/P EST MOD 30 MIN: CPT

## 2018-09-07 RX ORDER — SAW/PYGEUM/BETA/HERB/D3/B6/ZN 30 MG-25MG
CAPSULE ORAL
Refills: 0 | Status: DISCONTINUED | COMMUNITY
End: 2018-09-07

## 2018-09-13 ENCOUNTER — FORM ENCOUNTER (OUTPATIENT)
Age: 78
End: 2018-09-13

## 2018-09-14 ENCOUNTER — OUTPATIENT (OUTPATIENT)
Dept: OUTPATIENT SERVICES | Facility: HOSPITAL | Age: 78
LOS: 1 days | Discharge: HOME | End: 2018-09-14
Payer: MEDICARE

## 2018-09-14 VITALS
HEART RATE: 72 BPM | DIASTOLIC BLOOD PRESSURE: 76 MMHG | SYSTOLIC BLOOD PRESSURE: 145 MMHG | WEIGHT: 154.98 LBS | RESPIRATION RATE: 16 BRPM | TEMPERATURE: 98 F | HEIGHT: 67 IN

## 2018-09-14 VITALS
HEART RATE: 70 BPM | RESPIRATION RATE: 16 BRPM | SYSTOLIC BLOOD PRESSURE: 137 MMHG | OXYGEN SATURATION: 99 % | DIASTOLIC BLOOD PRESSURE: 68 MMHG | TEMPERATURE: 98 F

## 2018-09-14 DIAGNOSIS — Z90.89 ACQUIRED ABSENCE OF OTHER ORGANS: Chronic | ICD-10-CM

## 2018-09-14 DIAGNOSIS — S62.101A FRACTURE OF UNSPECIFIED CARPAL BONE, RIGHT WRIST, INITIAL ENCOUNTER FOR CLOSED FRACTURE: Chronic | ICD-10-CM

## 2018-09-14 DIAGNOSIS — Z98.1 ARTHRODESIS STATUS: Chronic | ICD-10-CM

## 2018-09-14 DIAGNOSIS — Z85.118 PERSONAL HISTORY OF OTHER MALIGNANT NEOPLASM OF BRONCHUS AND LUNG: Chronic | ICD-10-CM

## 2018-09-14 DIAGNOSIS — Z98.890 OTHER SPECIFIED POSTPROCEDURAL STATES: Chronic | ICD-10-CM

## 2018-09-14 DIAGNOSIS — Z98.41 CATARACT EXTRACTION STATUS, RIGHT EYE: Chronic | ICD-10-CM

## 2018-09-14 DIAGNOSIS — Z87.19 PERSONAL HISTORY OF OTHER DISEASES OF THE DIGESTIVE SYSTEM: Chronic | ICD-10-CM

## 2018-09-14 DIAGNOSIS — Z90.49 ACQUIRED ABSENCE OF OTHER SPECIFIED PARTS OF DIGESTIVE TRACT: Chronic | ICD-10-CM

## 2018-09-14 LAB
ANION GAP SERPL CALC-SCNC: 7 MMOL/L — SIGNIFICANT CHANGE UP (ref 5–17)
BUN SERPL-MCNC: 32 MG/DL — HIGH (ref 7–23)
CALCIUM SERPL-MCNC: 9.2 MG/DL — SIGNIFICANT CHANGE UP (ref 8.5–10.1)
CHLORIDE SERPL-SCNC: 107 MMOL/L — SIGNIFICANT CHANGE UP (ref 96–108)
CO2 SERPL-SCNC: 27 MMOL/L — SIGNIFICANT CHANGE UP (ref 22–31)
CREAT SERPL-MCNC: 0.92 MG/DL — SIGNIFICANT CHANGE UP (ref 0.5–1.3)
GLUCOSE SERPL-MCNC: 127 MG/DL — HIGH (ref 70–99)
HCT VFR BLD CALC: 36.6 % — LOW (ref 39–50)
HGB BLD-MCNC: 12.6 G/DL — LOW (ref 13–17)
INR BLD: 1.06 RATIO — SIGNIFICANT CHANGE UP (ref 0.88–1.16)
MCHC RBC-ENTMCNC: 32.7 PG — SIGNIFICANT CHANGE UP (ref 27–34)
MCHC RBC-ENTMCNC: 34.4 GM/DL — SIGNIFICANT CHANGE UP (ref 32–36)
MCV RBC AUTO: 95.1 FL — SIGNIFICANT CHANGE UP (ref 80–100)
NRBC # BLD: 0 /100 WBCS — SIGNIFICANT CHANGE UP (ref 0–0)
PLATELET # BLD AUTO: 191 K/UL — SIGNIFICANT CHANGE UP (ref 150–400)
POTASSIUM SERPL-MCNC: 4.7 MMOL/L — SIGNIFICANT CHANGE UP (ref 3.5–5.3)
POTASSIUM SERPL-SCNC: 4.7 MMOL/L — SIGNIFICANT CHANGE UP (ref 3.5–5.3)
PROTHROM AB SERPL-ACNC: 11.5 SEC — SIGNIFICANT CHANGE UP (ref 9.8–12.7)
RBC # BLD: 3.85 M/UL — LOW (ref 4.2–5.8)
RBC # FLD: 12.7 % — SIGNIFICANT CHANGE UP (ref 10.3–14.5)
SODIUM SERPL-SCNC: 141 MMOL/L — SIGNIFICANT CHANGE UP (ref 135–145)
WBC # BLD: 18.26 K/UL — HIGH (ref 3.8–10.5)
WBC # FLD AUTO: 18.26 K/UL — HIGH (ref 3.8–10.5)

## 2018-09-14 PROCEDURE — 36590 REMOVAL TUNNELED CV CATH: CPT

## 2018-09-14 PROCEDURE — 93010 ELECTROCARDIOGRAM REPORT: CPT

## 2018-09-14 PROCEDURE — 77001 FLUOROGUIDE FOR VEIN DEVICE: CPT | Mod: 26

## 2018-09-14 RX ORDER — ONDANSETRON 8 MG/1
4 TABLET, FILM COATED ORAL EVERY 6 HOURS
Qty: 0 | Refills: 0 | Status: DISCONTINUED | OUTPATIENT
Start: 2018-09-14 | End: 2018-09-14

## 2018-09-14 RX ORDER — OXYCODONE HYDROCHLORIDE 5 MG/1
5 TABLET ORAL ONCE
Qty: 0 | Refills: 0 | Status: DISCONTINUED | OUTPATIENT
Start: 2018-09-14 | End: 2018-09-14

## 2018-09-14 RX ORDER — SODIUM CHLORIDE 9 MG/ML
1000 INJECTION, SOLUTION INTRAVENOUS
Qty: 0 | Refills: 0 | Status: DISCONTINUED | OUTPATIENT
Start: 2018-09-14 | End: 2018-09-14

## 2018-09-14 RX ORDER — ACETAMINOPHEN 500 MG
1000 TABLET ORAL ONCE
Qty: 0 | Refills: 0 | Status: COMPLETED | OUTPATIENT
Start: 2018-09-14 | End: 2018-09-14

## 2018-09-14 RX ADMIN — Medication 400 MILLIGRAM(S): at 13:55

## 2018-09-14 NOTE — ASU DISCHARGE PLAN (ADULT/PEDIATRIC). - SPECIAL INSTRUCTIONS
Do not peel off skin glue. The glue will fall off on its own.    No bathing or swimming for one week.    Do not scrub incision. Water can flow over incision. Pat dry.

## 2018-09-14 NOTE — PROGRESS NOTE ADULT - SUBJECTIVE AND OBJECTIVE BOX
Interventional Radiology Brief- Operative Note    Procedure: Port removal    Operators: Ramos Cobb M.D.    Anesthesia (type): MAC, local    Contrast:  none    EBL: 5 cc    Findings/Follow up Plan of Care: R IJ port removal.    Specimens Removed: none    Implants: none    Complications: none    Condition/Disposition: PACU and then home.    Please call Interventional Radiology with any questions, concerns, or issues.

## 2018-09-19 DIAGNOSIS — Z85.01 PERSONAL HISTORY OF MALIGNANT NEOPLASM OF ESOPHAGUS: ICD-10-CM

## 2018-09-19 DIAGNOSIS — N40.0 BENIGN PROSTATIC HYPERPLASIA WITHOUT LOWER URINARY TRACT SYMPTOMS: ICD-10-CM

## 2018-09-19 DIAGNOSIS — Z45.2 ENCOUNTER FOR ADJUSTMENT AND MANAGEMENT OF VASCULAR ACCESS DEVICE: ICD-10-CM

## 2018-09-19 DIAGNOSIS — K21.9 GASTRO-ESOPHAGEAL REFLUX DISEASE WITHOUT ESOPHAGITIS: ICD-10-CM

## 2018-09-19 DIAGNOSIS — I10 ESSENTIAL (PRIMARY) HYPERTENSION: ICD-10-CM

## 2018-09-19 DIAGNOSIS — Z85.118 PERSONAL HISTORY OF OTHER MALIGNANT NEOPLASM OF BRONCHUS AND LUNG: ICD-10-CM

## 2018-10-19 ENCOUNTER — APPOINTMENT (OUTPATIENT)
Dept: ELECTROPHYSIOLOGY | Facility: CLINIC | Age: 78
End: 2018-10-19
Payer: MEDICARE

## 2018-10-19 ENCOUNTER — NON-APPOINTMENT (OUTPATIENT)
Age: 78
End: 2018-10-19

## 2018-10-19 VITALS
SYSTOLIC BLOOD PRESSURE: 136 MMHG | RESPIRATION RATE: 16 BRPM | DIASTOLIC BLOOD PRESSURE: 74 MMHG | BODY MASS INDEX: 26.68 KG/M2 | OXYGEN SATURATION: 100 % | WEIGHT: 170 LBS | HEIGHT: 67 IN | HEART RATE: 82 BPM

## 2018-10-19 VITALS
DIASTOLIC BLOOD PRESSURE: 79 MMHG | HEIGHT: 67 IN | HEART RATE: 59 BPM | OXYGEN SATURATION: 100 % | WEIGHT: 150 LBS | RESPIRATION RATE: 16 BRPM | BODY MASS INDEX: 23.54 KG/M2 | SYSTOLIC BLOOD PRESSURE: 140 MMHG

## 2018-10-19 VITALS
SYSTOLIC BLOOD PRESSURE: 140 MMHG | DIASTOLIC BLOOD PRESSURE: 79 MMHG | RESPIRATION RATE: 17 BRPM | OXYGEN SATURATION: 100 % | HEART RATE: 59 BPM

## 2018-10-19 VITALS — BODY MASS INDEX: 23.49 KG/M2 | WEIGHT: 150 LBS

## 2018-10-19 DIAGNOSIS — Z00.00 ENCOUNTER FOR GENERAL ADULT MEDICAL EXAMINATION W/OUT ABNORMAL FINDINGS: ICD-10-CM

## 2018-10-19 PROCEDURE — 99205 OFFICE O/P NEW HI 60 MIN: CPT

## 2018-10-19 PROCEDURE — 93000 ELECTROCARDIOGRAM COMPLETE: CPT

## 2018-10-30 ENCOUNTER — OUTPATIENT (OUTPATIENT)
Dept: OUTPATIENT SERVICES | Facility: HOSPITAL | Age: 78
LOS: 1 days | Discharge: HOME | End: 2018-10-30
Payer: MEDICARE

## 2018-10-30 VITALS
HEIGHT: 67 IN | TEMPERATURE: 98 F | WEIGHT: 156.09 LBS | HEART RATE: 71 BPM | OXYGEN SATURATION: 100 % | SYSTOLIC BLOOD PRESSURE: 124 MMHG | RESPIRATION RATE: 16 BRPM | DIASTOLIC BLOOD PRESSURE: 79 MMHG

## 2018-10-30 DIAGNOSIS — Z90.49 ACQUIRED ABSENCE OF OTHER SPECIFIED PARTS OF DIGESTIVE TRACT: Chronic | ICD-10-CM

## 2018-10-30 DIAGNOSIS — Z85.118 PERSONAL HISTORY OF OTHER MALIGNANT NEOPLASM OF BRONCHUS AND LUNG: Chronic | ICD-10-CM

## 2018-10-30 DIAGNOSIS — Z98.1 ARTHRODESIS STATUS: Chronic | ICD-10-CM

## 2018-10-30 DIAGNOSIS — Z90.89 ACQUIRED ABSENCE OF OTHER ORGANS: Chronic | ICD-10-CM

## 2018-10-30 DIAGNOSIS — I48.92 UNSPECIFIED ATRIAL FLUTTER: ICD-10-CM

## 2018-10-30 DIAGNOSIS — S62.101A FRACTURE OF UNSPECIFIED CARPAL BONE, RIGHT WRIST, INITIAL ENCOUNTER FOR CLOSED FRACTURE: Chronic | ICD-10-CM

## 2018-10-30 DIAGNOSIS — Z41.9 ENCOUNTER FOR PROCEDURE FOR PURPOSES OTHER THAN REMEDYING HEALTH STATE, UNSPECIFIED: Chronic | ICD-10-CM

## 2018-10-30 DIAGNOSIS — Z98.41 CATARACT EXTRACTION STATUS, RIGHT EYE: Chronic | ICD-10-CM

## 2018-10-30 DIAGNOSIS — Z87.19 PERSONAL HISTORY OF OTHER DISEASES OF THE DIGESTIVE SYSTEM: Chronic | ICD-10-CM

## 2018-10-30 DIAGNOSIS — Z98.890 OTHER SPECIFIED POSTPROCEDURAL STATES: Chronic | ICD-10-CM

## 2018-10-30 LAB
BASOPHILS # BLD AUTO: 0.05 K/UL — SIGNIFICANT CHANGE UP (ref 0–0.2)
BASOPHILS NFR BLD AUTO: 0.8 % — SIGNIFICANT CHANGE UP (ref 0–2)
EOSINOPHIL # BLD AUTO: 0.1 K/UL — SIGNIFICANT CHANGE UP (ref 0–0.5)
EOSINOPHIL NFR BLD AUTO: 1.6 % — SIGNIFICANT CHANGE UP (ref 0–6)
HCT VFR BLD CALC: 38.2 % — LOW (ref 39–50)
HGB BLD-MCNC: 12.9 G/DL — LOW (ref 13–17)
IMM GRANULOCYTES NFR BLD AUTO: 0.5 % — SIGNIFICANT CHANGE UP (ref 0–1.5)
LYMPHOCYTES # BLD AUTO: 0.71 K/UL — LOW (ref 1–3.3)
LYMPHOCYTES # BLD AUTO: 11.4 % — LOW (ref 13–44)
MCHC RBC-ENTMCNC: 32.6 PG — SIGNIFICANT CHANGE UP (ref 27–34)
MCHC RBC-ENTMCNC: 33.8 GM/DL — SIGNIFICANT CHANGE UP (ref 32–36)
MCV RBC AUTO: 96.5 FL — SIGNIFICANT CHANGE UP (ref 80–100)
MONOCYTES # BLD AUTO: 0.56 K/UL — SIGNIFICANT CHANGE UP (ref 0–0.9)
MONOCYTES NFR BLD AUTO: 9 % — SIGNIFICANT CHANGE UP (ref 2–14)
NEUTROPHILS # BLD AUTO: 4.79 K/UL — SIGNIFICANT CHANGE UP (ref 1.8–7.4)
NEUTROPHILS NFR BLD AUTO: 76.7 % — SIGNIFICANT CHANGE UP (ref 43–77)
NRBC # BLD: 0 /100 WBCS — SIGNIFICANT CHANGE UP (ref 0–0)
PLATELET # BLD AUTO: 218 K/UL — SIGNIFICANT CHANGE UP (ref 150–400)
RBC # BLD: 3.96 M/UL — LOW (ref 4.2–5.8)
RBC # FLD: 12.4 % — SIGNIFICANT CHANGE UP (ref 10.3–14.5)
WBC # BLD: 6.24 K/UL — SIGNIFICANT CHANGE UP (ref 3.8–10.5)
WBC # FLD AUTO: 6.24 K/UL — SIGNIFICANT CHANGE UP (ref 3.8–10.5)

## 2018-10-30 PROCEDURE — 71046 X-RAY EXAM CHEST 2 VIEWS: CPT | Mod: 26

## 2018-10-30 RX ORDER — MELOXICAM 15 MG/1
1 TABLET ORAL
Qty: 0 | Refills: 0 | COMMUNITY

## 2018-10-30 NOTE — H&P PST ADULT - PMH
Atrial flutter, paroxysmal    BPH (benign prostatic hyperplasia)    Diverticulosis    Esophageal carcinoma  2015 s/p chmeo and radiation  mets to lung 2017  s/p chemo finished  2/2018  GERD (gastroesophageal reflux disease)    HLD (hyperlipidemia)    HTN (hypertension)    Lung cancer  s/p chemo 2018

## 2018-10-30 NOTE — H&P PST ADULT - PSH
Elective surgery  port placement 2017 port removal 2018  H/O esophagectomy  2015  H/O ventral hernia  umbilical 1998  H/O: lung cancer  lung resection LEFT 2017  History of bilateral cataract extraction    History of fusion of thoracic spine  2011  History of shoulder surgery  bilateral rotator cuff  S/P carpal tunnel release  rift 2014  S/P tonsillectomy and adenoidectomy    Wrist fracture, bilateral

## 2018-10-30 NOTE — H&P PST ADULT - NSANTHOSAYNRD_GEN_A_CORE
No. CANDIS screening performed.  STOP BANG Legend: 0-2 = LOW Risk; 3-4 = INTERMEDIATE Risk; 5-8 = HIGH Risk

## 2018-10-30 NOTE — H&P PST ADULT - ASSESSMENT
78 year old male presents to PST for EPS with ablation with biosense under anesthesia  1. EPS with pre-op instructions   2. eliquis management EPS in instruction sheet  3. Labs ordered as requested

## 2018-10-31 PROBLEM — C15.9 MALIGNANT NEOPLASM OF ESOPHAGUS, UNSPECIFIED: Chronic | Status: ACTIVE | Noted: 2017-09-20

## 2018-10-31 PROBLEM — C34.90 MALIGNANT NEOPLASM OF UNSPECIFIED PART OF UNSPECIFIED BRONCHUS OR LUNG: Chronic | Status: ACTIVE | Noted: 2017-09-20

## 2018-11-01 NOTE — ASU PATIENT PROFILE, ADULT - REASON FOR ADMISSION, PROFILE
I was told I had Atrial Flutter/ I checked my pulse and it was very rapid then  I went to the hospital

## 2018-11-02 ENCOUNTER — OUTPATIENT (OUTPATIENT)
Dept: OUTPATIENT SERVICES | Facility: HOSPITAL | Age: 78
LOS: 1 days | Discharge: HOME | End: 2018-11-02
Payer: MEDICARE

## 2018-11-02 VITALS
HEIGHT: 66.93 IN | SYSTOLIC BLOOD PRESSURE: 144 MMHG | TEMPERATURE: 97 F | WEIGHT: 156.09 LBS | HEART RATE: 65 BPM | RESPIRATION RATE: 18 BRPM | OXYGEN SATURATION: 100 % | DIASTOLIC BLOOD PRESSURE: 71 MMHG

## 2018-11-02 DIAGNOSIS — Z87.891 PERSONAL HISTORY OF NICOTINE DEPENDENCE: ICD-10-CM

## 2018-11-02 DIAGNOSIS — K57.90 DIVERTICULOSIS OF INTESTINE, PART UNSPECIFIED, WITHOUT PERFORATION OR ABSCESS WITHOUT BLEEDING: ICD-10-CM

## 2018-11-02 DIAGNOSIS — I48.92 UNSPECIFIED ATRIAL FLUTTER: ICD-10-CM

## 2018-11-02 DIAGNOSIS — Z99.89 DEPENDENCE ON OTHER ENABLING MACHINES AND DEVICES: ICD-10-CM

## 2018-11-02 DIAGNOSIS — I10 ESSENTIAL (PRIMARY) HYPERTENSION: ICD-10-CM

## 2018-11-02 DIAGNOSIS — Z98.890 OTHER SPECIFIED POSTPROCEDURAL STATES: Chronic | ICD-10-CM

## 2018-11-02 DIAGNOSIS — Z90.49 ACQUIRED ABSENCE OF OTHER SPECIFIED PARTS OF DIGESTIVE TRACT: Chronic | ICD-10-CM

## 2018-11-02 DIAGNOSIS — Z90.49 ACQUIRED ABSENCE OF OTHER SPECIFIED PARTS OF DIGESTIVE TRACT: ICD-10-CM

## 2018-11-02 DIAGNOSIS — Z98.42 CATARACT EXTRACTION STATUS, LEFT EYE: ICD-10-CM

## 2018-11-02 DIAGNOSIS — Z87.19 PERSONAL HISTORY OF OTHER DISEASES OF THE DIGESTIVE SYSTEM: Chronic | ICD-10-CM

## 2018-11-02 DIAGNOSIS — Z98.1 ARTHRODESIS STATUS: Chronic | ICD-10-CM

## 2018-11-02 DIAGNOSIS — Z85.118 PERSONAL HISTORY OF OTHER MALIGNANT NEOPLASM OF BRONCHUS AND LUNG: Chronic | ICD-10-CM

## 2018-11-02 DIAGNOSIS — Z90.2 ACQUIRED ABSENCE OF LUNG [PART OF]: ICD-10-CM

## 2018-11-02 DIAGNOSIS — Z98.1 ARTHRODESIS STATUS: ICD-10-CM

## 2018-11-02 DIAGNOSIS — Z79.01 LONG TERM (CURRENT) USE OF ANTICOAGULANTS: ICD-10-CM

## 2018-11-02 DIAGNOSIS — E78.5 HYPERLIPIDEMIA, UNSPECIFIED: ICD-10-CM

## 2018-11-02 DIAGNOSIS — Z85.118 PERSONAL HISTORY OF OTHER MALIGNANT NEOPLASM OF BRONCHUS AND LUNG: ICD-10-CM

## 2018-11-02 DIAGNOSIS — Z41.9 ENCOUNTER FOR PROCEDURE FOR PURPOSES OTHER THAN REMEDYING HEALTH STATE, UNSPECIFIED: Chronic | ICD-10-CM

## 2018-11-02 DIAGNOSIS — Z90.89 ACQUIRED ABSENCE OF OTHER ORGANS: Chronic | ICD-10-CM

## 2018-11-02 DIAGNOSIS — Z92.3 PERSONAL HISTORY OF IRRADIATION: ICD-10-CM

## 2018-11-02 DIAGNOSIS — Z85.01 PERSONAL HISTORY OF MALIGNANT NEOPLASM OF ESOPHAGUS: ICD-10-CM

## 2018-11-02 DIAGNOSIS — Z98.41 CATARACT EXTRACTION STATUS, RIGHT EYE: ICD-10-CM

## 2018-11-02 DIAGNOSIS — K21.9 GASTRO-ESOPHAGEAL REFLUX DISEASE WITHOUT ESOPHAGITIS: ICD-10-CM

## 2018-11-02 DIAGNOSIS — S62.101A FRACTURE OF UNSPECIFIED CARPAL BONE, RIGHT WRIST, INITIAL ENCOUNTER FOR CLOSED FRACTURE: Chronic | ICD-10-CM

## 2018-11-02 DIAGNOSIS — Z92.21 PERSONAL HISTORY OF ANTINEOPLASTIC CHEMOTHERAPY: ICD-10-CM

## 2018-11-02 DIAGNOSIS — Z98.41 CATARACT EXTRACTION STATUS, RIGHT EYE: Chronic | ICD-10-CM

## 2018-11-02 DIAGNOSIS — N40.0 BENIGN PROSTATIC HYPERPLASIA WITHOUT LOWER URINARY TRACT SYMPTOMS: ICD-10-CM

## 2018-11-02 DIAGNOSIS — G47.33 OBSTRUCTIVE SLEEP APNEA (ADULT) (PEDIATRIC): ICD-10-CM

## 2018-11-02 PROCEDURE — 93010 ELECTROCARDIOGRAM REPORT: CPT

## 2018-11-02 PROCEDURE — 93613 INTRACARDIAC EPHYS 3D MAPG: CPT

## 2018-11-02 PROCEDURE — 93653 COMPRE EP EVAL TX SVT: CPT

## 2018-11-02 RX ORDER — PREGABALIN 225 MG/1
1000 CAPSULE ORAL DAILY
Qty: 0 | Refills: 0 | Status: DISCONTINUED | OUTPATIENT
Start: 2018-11-02 | End: 2018-11-03

## 2018-11-02 RX ORDER — APIXABAN 2.5 MG/1
5 TABLET, FILM COATED ORAL EVERY 12 HOURS
Qty: 0 | Refills: 0 | Status: DISCONTINUED | OUTPATIENT
Start: 2018-11-03 | End: 2018-11-03

## 2018-11-02 RX ORDER — ACETAMINOPHEN 500 MG
650 TABLET ORAL EVERY 6 HOURS
Qty: 0 | Refills: 0 | Status: DISCONTINUED | OUTPATIENT
Start: 2018-11-02 | End: 2018-11-03

## 2018-11-02 RX ORDER — SIMVASTATIN 20 MG/1
20 TABLET, FILM COATED ORAL AT BEDTIME
Qty: 0 | Refills: 0 | Status: DISCONTINUED | OUTPATIENT
Start: 2018-11-02 | End: 2018-11-03

## 2018-11-02 RX ORDER — PANTOPRAZOLE SODIUM 20 MG/1
40 TABLET, DELAYED RELEASE ORAL
Qty: 0 | Refills: 0 | Status: DISCONTINUED | OUTPATIENT
Start: 2018-11-02 | End: 2018-11-03

## 2018-11-02 RX ORDER — MULTIVIT-MIN/FERROUS GLUCONATE 9 MG/15 ML
1 LIQUID (ML) ORAL DAILY
Qty: 0 | Refills: 0 | Status: DISCONTINUED | OUTPATIENT
Start: 2018-11-02 | End: 2018-11-03

## 2018-11-02 RX ORDER — CHOLECALCIFEROL (VITAMIN D3) 125 MCG
2000 CAPSULE ORAL DAILY
Qty: 0 | Refills: 0 | Status: DISCONTINUED | OUTPATIENT
Start: 2018-11-02 | End: 2018-11-03

## 2018-11-02 RX ADMIN — Medication 650 MILLIGRAM(S): at 22:59

## 2018-11-02 RX ADMIN — SIMVASTATIN 20 MILLIGRAM(S): 20 TABLET, FILM COATED ORAL at 22:54

## 2018-11-02 NOTE — PROGRESS NOTE ADULT - SUBJECTIVE AND OBJECTIVE BOX
Electrophysiology Lab - Preliminary Procedure report    Procedure: Atrial flutter ablation    Indication: atrial flutter    Catheters ;RFV ( 2)        Findings: successful atrial flutter ablation        Recommendations:  Resume Eliquis tomorrow  Continue on low dose beta blocker

## 2018-11-02 NOTE — PACU DISCHARGE NOTE - COMMENTS
report given to christine rn; placed on cardiac monitor with rhythm verified by carlita, monitor tech 3N. report given to chrisitne rn; placed on cardiac monitor with rhythm verified by carlita, monitor tech 3N. Pt transported via stretcher to 3N with transporter

## 2018-11-03 ENCOUNTER — TRANSCRIPTION ENCOUNTER (OUTPATIENT)
Age: 78
End: 2018-11-03

## 2018-11-03 VITALS
HEART RATE: 70 BPM | TEMPERATURE: 98 F | SYSTOLIC BLOOD PRESSURE: 125 MMHG | DIASTOLIC BLOOD PRESSURE: 58 MMHG | OXYGEN SATURATION: 97 % | RESPIRATION RATE: 17 BRPM

## 2018-11-03 LAB
ALBUMIN SERPL ELPH-MCNC: 3.1 G/DL — LOW (ref 3.3–5)
ALP SERPL-CCNC: 73 U/L — SIGNIFICANT CHANGE UP (ref 40–120)
ALT FLD-CCNC: 24 U/L — SIGNIFICANT CHANGE UP (ref 12–78)
ANION GAP SERPL CALC-SCNC: 7 MMOL/L — SIGNIFICANT CHANGE UP (ref 5–17)
AST SERPL-CCNC: 31 U/L — SIGNIFICANT CHANGE UP (ref 15–37)
BILIRUB SERPL-MCNC: 0.4 MG/DL — SIGNIFICANT CHANGE UP (ref 0.2–1.2)
BUN SERPL-MCNC: 25 MG/DL — HIGH (ref 7–23)
CALCIUM SERPL-MCNC: 8.5 MG/DL — SIGNIFICANT CHANGE UP (ref 8.5–10.1)
CHLORIDE SERPL-SCNC: 105 MMOL/L — SIGNIFICANT CHANGE UP (ref 96–108)
CO2 SERPL-SCNC: 27 MMOL/L — SIGNIFICANT CHANGE UP (ref 22–31)
CREAT SERPL-MCNC: 0.88 MG/DL — SIGNIFICANT CHANGE UP (ref 0.5–1.3)
GLUCOSE SERPL-MCNC: 102 MG/DL — HIGH (ref 70–99)
POTASSIUM SERPL-MCNC: 4.4 MMOL/L — SIGNIFICANT CHANGE UP (ref 3.5–5.3)
POTASSIUM SERPL-SCNC: 4.4 MMOL/L — SIGNIFICANT CHANGE UP (ref 3.5–5.3)
PROT SERPL-MCNC: 6.3 GM/DL — SIGNIFICANT CHANGE UP (ref 6–8.3)
SODIUM SERPL-SCNC: 139 MMOL/L — SIGNIFICANT CHANGE UP (ref 135–145)

## 2018-11-03 PROCEDURE — 93010 ELECTROCARDIOGRAM REPORT: CPT

## 2018-11-03 RX ORDER — ACETAMINOPHEN 500 MG
2 TABLET ORAL
Qty: 0 | Refills: 0 | DISCHARGE
Start: 2018-11-03

## 2018-11-03 RX ADMIN — APIXABAN 5 MILLIGRAM(S): 2.5 TABLET, FILM COATED ORAL at 05:56

## 2018-11-03 RX ADMIN — PANTOPRAZOLE SODIUM 40 MILLIGRAM(S): 20 TABLET, DELAYED RELEASE ORAL at 05:56

## 2018-11-03 NOTE — DISCHARGE NOTE ADULT - HOSPITAL COURSE
78 year old male PMH of esophageal cancer s/p esophagectomy s/p chemo and radiation 2015  with mets to left lung s/p resection 2017 s/p chemo 2018, GERD,  HLD, diverticulosis ; Pt had an episode of Atrial flutter early October 2018  on Eliquis.  Pt presented for EP study and ablation with Dr. Upton    A/P: s/p atrial flutter ablation POD #1  Pt will f/u as outpatient in 1 month.  Resume all prior meds.  Eliquis resumed this AM.  Stable for discharge to home, all post op instructions given and questions answered.

## 2018-11-03 NOTE — PROGRESS NOTE ADULT - ASSESSMENT
78 yr old male with history of atrial flutter on eliquis presented for atrial flutter ablation with Dr. Upton.    A/P: s/p atrial flutter ablation POD #1  Pt will f/u as outpatient in 1 month.  Resume all prior meds.  Eliquis resumed this AM.  Stable for discharge to home, all post op instructions given and questions answered.

## 2018-11-03 NOTE — DISCHARGE NOTE ADULT - PLAN OF CARE
to remain in sinus rhythm No heavy lifting or stair climbing x 24 hrs.  Report any right leg symptoms or bleeding.  F/U with Dr. Upton in 1 month.

## 2018-11-03 NOTE — DISCHARGE NOTE ADULT - CARE PLAN
Principal Discharge DX:	Atrial flutter, paroxysmal  Goal:	to remain in sinus rhythm  Assessment and plan of treatment:	No heavy lifting or stair climbing x 24 hrs.  Report any right leg symptoms or bleeding.  F/U with Dr. Upton in 1 month.

## 2018-11-03 NOTE — DISCHARGE NOTE ADULT - MEDICATION SUMMARY - MEDICATIONS TO TAKE
I will START or STAY ON the medications listed below when I get home from the hospital:    acetaminophen 325 mg oral tablet  -- 2 tab(s) by mouth every 6 hours, As needed, Mild Pain (1 - 3)  -- Indication: For mild pain    alfuzosin 10 mg oral tablet, extended release  -- 1 tab(s) by mouth once a day  -- Indication: For ATRIAL FLUTTER    Eliquis 5 mg oral tablet  -- 2x/day  -- Indication: For Aflutter    simvastatin 20 mg oral tablet  -- 1 tab(s) by mouth once a day (at bedtime)  -- Indication: For HL    dicyclomine 10 mg oral capsule  -- Indication: For diverticuli    omeprazole 20 mg oral delayed release capsule  -- 1 cap(s) by mouth once a day  -- Indication: For gerd    Ocuvite oral tablet  -- 1 tab(s) by mouth once a day  -- Indication: For supplement    Vitamin D3 2000 intl units oral tablet  -- 1 tab(s) by mouth once a day  -- Indication: For supplement    Vitamin B12 1000 mcg oral tablet  -- 1 tab(s) by mouth once a day  -- Indication: For supplement

## 2018-11-03 NOTE — DISCHARGE NOTE ADULT - CARE PROVIDER_API CALL
Kai Upton (MD), Cardiac Electrophysiology; Cardiology; Internal Medicine  [address] [place] [address] [id number]  Phone: [phone]  Fax: [phone]

## 2018-11-03 NOTE — PROGRESS NOTE ADULT - SUBJECTIVE AND OBJECTIVE BOX
78 year old male PMH of esophageal cancer s/p esophagectomy s/p chemo and radiation 2015  with mets to left lung s/p resection 2017 s/p chemo 2018, GERD,  HLD, diverticulosis ; Pt had an episode of Atrial flutter early October 2018  on Eliquis.  Pt presented for EP study and ablation with Dr. Upton    11/3/18:  s/p successful atrial flutter ablation POD #1.  TELE: sinus rhythm 60-70's  EKG: NSR 63 bpm, UT 178ms, QRS 110ms      MEDICATIONS  (STANDING):  apixaban 5 milliGRAM(s) Oral every 12 hours  cholecalciferol 2000 Unit(s) Oral daily  cyanocobalamin 1000 MICROGram(s) Oral daily  dicyclomine 10 milliGRAM(s) Oral daily  multivitamin/minerals 1 Tablet(s) Oral daily  pantoprazole    Tablet 40 milliGRAM(s) Oral before breakfast  simvastatin 20 milliGRAM(s) Oral at bedtime    MEDICATIONS  (PRN):  acetaminophen   Tablet .. 650 milliGRAM(s) Oral every 6 hours PRN Mild Pain (1 - 3)      Allergies    No Known Allergies    Intolerances    Hydromet (Other)      Vital Signs Last 24 Hrs  T(C): 36.5 (03 Nov 2018 05:11), Max: 36.5 (02 Nov 2018 16:50)  T(F): 97.7 (03 Nov 2018 05:11), Max: 97.7 (02 Nov 2018 16:50)  HR: 64 (03 Nov 2018 05:11) (61 - 83)  BP: 145/68 (03 Nov 2018 05:11) (127/63 - 168/65)  BP(mean): --  RR: 16 (03 Nov 2018 05:11) (16 - 20)  SpO2: 95% (03 Nov 2018 05:11) (95% - 100%)    ROS: Denies CP, SOB, dizziness, palpitations.  All other ROS negative.    PHYSICAL EXAMINATION:    GENERAL APPEARANCE:  Pt. is not currently dyspneic, in no distress. Pt. is alert, oriented, and pleasant.  HEENT:  Pupils are normal and react normally. No icterus. Mucous membranes well colored.  NECK:  Supple. No lymphadenopathy. Jugular venous pressure not elevated. Carotids equal.   HEART:   S1S2 regular. There are no murmurs, rubs or gallops noted  CHEST:  Chest is clear to auscultation. Normal respiratory effort.  ABDOMEN:  Soft and nontender.   EXTREMITIES:  There is no edema. Right groin site is soft, minimal ecchymosis, no bleeding or hematoma, pulses 2+  SKIN:  No rash or significant lesions are noted.      LABS:    11-03    139  |  105  |  25<H>  ----------------------------<  102<H>  4.4   |  27  |  0.88    Ca    8.5      03 Nov 2018 06:09    TPro  6.3  /  Alb  3.1<L>  /  TBili  0.4  /  DBili  x   /  AST  31  /  ALT  24  /  AlkPhos  73  11-03

## 2018-11-03 NOTE — DISCHARGE NOTE ADULT - PATIENT PORTAL LINK FT
You can access the [facility] Patient Portal, offered by [facility], by registering with the following website: [email or web address]

## 2018-11-04 ENCOUNTER — MEDICATION RENEWAL (OUTPATIENT)
Age: 78
End: 2018-11-04

## 2018-11-23 LAB
ANION GAP SERPL CALC-SCNC: 6 MMOL/L — SIGNIFICANT CHANGE UP (ref 5–17)
BUN SERPL-MCNC: 24 MG/DL — HIGH (ref 7–23)
CALCIUM SERPL-MCNC: 9.5 MG/DL — SIGNIFICANT CHANGE UP (ref 8.5–10.1)
CHLORIDE SERPL-SCNC: 106 MMOL/L — SIGNIFICANT CHANGE UP (ref 96–108)
CO2 SERPL-SCNC: 29 MMOL/L — SIGNIFICANT CHANGE UP (ref 22–31)
CREAT SERPL-MCNC: 0.9 MG/DL — SIGNIFICANT CHANGE UP (ref 0.5–1.3)
GLUCOSE SERPL-MCNC: 117 MG/DL — HIGH (ref 70–99)
POTASSIUM SERPL-MCNC: 4.5 MMOL/L — SIGNIFICANT CHANGE UP (ref 3.5–5.3)
POTASSIUM SERPL-SCNC: 4.5 MMOL/L — SIGNIFICANT CHANGE UP (ref 3.5–5.3)
SODIUM SERPL-SCNC: 141 MMOL/L — SIGNIFICANT CHANGE UP (ref 135–145)

## 2018-12-02 ENCOUNTER — NON-APPOINTMENT (OUTPATIENT)
Age: 78
End: 2018-12-02

## 2018-12-21 ENCOUNTER — NON-APPOINTMENT (OUTPATIENT)
Age: 78
End: 2018-12-21

## 2018-12-21 ENCOUNTER — APPOINTMENT (OUTPATIENT)
Dept: ELECTROPHYSIOLOGY | Facility: CLINIC | Age: 78
End: 2018-12-21
Payer: MEDICARE

## 2018-12-21 VITALS
BODY MASS INDEX: 23.7 KG/M2 | HEART RATE: 70 BPM | WEIGHT: 151 LBS | OXYGEN SATURATION: 99 % | RESPIRATION RATE: 17 BRPM | HEIGHT: 67 IN | DIASTOLIC BLOOD PRESSURE: 65 MMHG | SYSTOLIC BLOOD PRESSURE: 126 MMHG

## 2018-12-21 DIAGNOSIS — I10 ESSENTIAL (PRIMARY) HYPERTENSION: ICD-10-CM

## 2018-12-21 PROCEDURE — 93000 ELECTROCARDIOGRAM COMPLETE: CPT

## 2018-12-21 PROCEDURE — 99214 OFFICE O/P EST MOD 30 MIN: CPT

## 2018-12-21 RX ORDER — APIXABAN 5 MG/1
5 TABLET, FILM COATED ORAL
Qty: 180 | Refills: 3 | Status: DISCONTINUED | COMMUNITY
End: 2018-12-21

## 2018-12-21 NOTE — REVIEW OF SYSTEMS
[Joint Pain] : joint pain [Negative] : ENT [Feeling Fatigued] : not feeling fatigued [Blurry Vision] : no blurred vision [Palpitations] : no palpitations [Cough] : no cough [Abdominal Pain] : no abdominal pain [Dysphagia] : no dysphagia [Urinary Frequency] : no change in urinary frequency [Skin: A Rash] : no rash: [Confusion] : no confusion was observed [Anxiety] : no anxiety [Easy Bleeding] : no tendency for easy bleeding [Easy Bruising] : no tendency for easy bruising

## 2018-12-21 NOTE — HISTORY OF PRESENT ILLNESS
[FreeTextEntry1] : 79 yo man seen in followup post  atrial flutter ablation\par He is feeling well and has no complaints. No palpitations\par Active without symptoms. \par He has prior history of esophageal CA/mets to Lungs - s/p resection and chemo ( completed Feb 2018)\par HTN - recent yes\par DM - no\par Former Smoker\par No lung problems\par No kidney problems\par No prior heart disease.

## 2018-12-21 NOTE — PHYSICAL EXAM
[General Appearance - Well Developed] : well developed [General Appearance - In No Acute Distress] : no acute distress [Eyelids - No Xanthelasma] : the eyelids demonstrated no xanthelasmas [Normal Oral Mucosa] : normal oral mucosa [Normal Jugular Venous V Waves Present] : normal jugular venous V waves present [Respiration, Rhythm And Depth] : normal respiratory rhythm and effort [Auscultation Breath Sounds / Voice Sounds] : lungs were clear to auscultation bilaterally [Heart Sounds] : normal S1 and S2 [Arterial Pulses Normal] : the arterial pulses were normal [Edema] : no peripheral edema present [Abdomen Soft] : soft [Abdomen Tenderness] : non-tender [Abnormal Walk] : normal gait [Nail Clubbing] : no clubbing of the fingernails [Cyanosis, Localized] : no localized cyanosis [No Venous Stasis] : no venous stasis [Impaired Insight] : insight and judgment were intact

## 2018-12-21 NOTE — DISCUSSION/SUMMARY
[FreeTextEntry1] : He is s/p Atrial flutter ablation and is doing well\par His CHADsVASc is based on age ( 2). He has a prior h/o elevated BP but it has been normal for years since weight loss ( prior to esophagectomy he was 200 lbs and BP was elevated - now 150 lbs and BP has been normal. \par He wants to come of Eliquis\par Risk and benefiits fully explained\par Will start ASA and obtain follow monitoring for AF/AFL

## 2019-01-28 ENCOUNTER — OUTPATIENT (OUTPATIENT)
Dept: OUTPATIENT SERVICES | Facility: HOSPITAL | Age: 79
LOS: 1 days | End: 2019-01-28
Payer: MEDICARE

## 2019-01-28 ENCOUNTER — APPOINTMENT (OUTPATIENT)
Dept: ULTRASOUND IMAGING | Facility: CLINIC | Age: 79
End: 2019-01-28
Payer: MEDICARE

## 2019-01-28 DIAGNOSIS — Z90.49 ACQUIRED ABSENCE OF OTHER SPECIFIED PARTS OF DIGESTIVE TRACT: Chronic | ICD-10-CM

## 2019-01-28 DIAGNOSIS — S62.101A FRACTURE OF UNSPECIFIED CARPAL BONE, RIGHT WRIST, INITIAL ENCOUNTER FOR CLOSED FRACTURE: Chronic | ICD-10-CM

## 2019-01-28 DIAGNOSIS — Z98.1 ARTHRODESIS STATUS: Chronic | ICD-10-CM

## 2019-01-28 DIAGNOSIS — Z85.118 PERSONAL HISTORY OF OTHER MALIGNANT NEOPLASM OF BRONCHUS AND LUNG: Chronic | ICD-10-CM

## 2019-01-28 DIAGNOSIS — Z87.19 PERSONAL HISTORY OF OTHER DISEASES OF THE DIGESTIVE SYSTEM: Chronic | ICD-10-CM

## 2019-01-28 DIAGNOSIS — Z98.890 OTHER SPECIFIED POSTPROCEDURAL STATES: Chronic | ICD-10-CM

## 2019-01-28 DIAGNOSIS — Z00.8 ENCOUNTER FOR OTHER GENERAL EXAMINATION: ICD-10-CM

## 2019-01-28 DIAGNOSIS — Z90.89 ACQUIRED ABSENCE OF OTHER ORGANS: Chronic | ICD-10-CM

## 2019-01-28 DIAGNOSIS — Z98.41 CATARACT EXTRACTION STATUS, RIGHT EYE: Chronic | ICD-10-CM

## 2019-01-28 DIAGNOSIS — Z41.9 ENCOUNTER FOR PROCEDURE FOR PURPOSES OTHER THAN REMEDYING HEALTH STATE, UNSPECIFIED: Chronic | ICD-10-CM

## 2019-01-28 PROBLEM — K57.90 DIVERTICULOSIS OF INTESTINE, PART UNSPECIFIED, WITHOUT PERFORATION OR ABSCESS WITHOUT BLEEDING: Chronic | Status: ACTIVE | Noted: 2018-10-30

## 2019-01-28 PROBLEM — E78.5 HYPERLIPIDEMIA, UNSPECIFIED: Chronic | Status: ACTIVE | Noted: 2018-10-30

## 2019-01-28 PROBLEM — I48.92 UNSPECIFIED ATRIAL FLUTTER: Chronic | Status: ACTIVE | Noted: 2018-10-30

## 2019-01-28 PROCEDURE — 76536 US EXAM OF HEAD AND NECK: CPT

## 2019-01-28 PROCEDURE — 76536 US EXAM OF HEAD AND NECK: CPT | Mod: 26

## 2019-03-10 ENCOUNTER — FORM ENCOUNTER (OUTPATIENT)
Age: 79
End: 2019-03-10

## 2019-03-11 ENCOUNTER — OUTPATIENT (OUTPATIENT)
Dept: OUTPATIENT SERVICES | Facility: HOSPITAL | Age: 79
LOS: 1 days | End: 2019-03-11
Payer: MEDICARE

## 2019-03-11 ENCOUNTER — APPOINTMENT (OUTPATIENT)
Dept: CT IMAGING | Facility: CLINIC | Age: 79
End: 2019-03-11
Payer: MEDICARE

## 2019-03-11 DIAGNOSIS — Z85.118 PERSONAL HISTORY OF OTHER MALIGNANT NEOPLASM OF BRONCHUS AND LUNG: Chronic | ICD-10-CM

## 2019-03-11 DIAGNOSIS — Z87.19 PERSONAL HISTORY OF OTHER DISEASES OF THE DIGESTIVE SYSTEM: Chronic | ICD-10-CM

## 2019-03-11 DIAGNOSIS — Z98.1 ARTHRODESIS STATUS: Chronic | ICD-10-CM

## 2019-03-11 DIAGNOSIS — Z98.41 CATARACT EXTRACTION STATUS, RIGHT EYE: Chronic | ICD-10-CM

## 2019-03-11 DIAGNOSIS — Z41.9 ENCOUNTER FOR PROCEDURE FOR PURPOSES OTHER THAN REMEDYING HEALTH STATE, UNSPECIFIED: Chronic | ICD-10-CM

## 2019-03-11 DIAGNOSIS — S62.101A FRACTURE OF UNSPECIFIED CARPAL BONE, RIGHT WRIST, INITIAL ENCOUNTER FOR CLOSED FRACTURE: Chronic | ICD-10-CM

## 2019-03-11 DIAGNOSIS — Z90.89 ACQUIRED ABSENCE OF OTHER ORGANS: Chronic | ICD-10-CM

## 2019-03-11 DIAGNOSIS — Z98.890 OTHER SPECIFIED POSTPROCEDURAL STATES: Chronic | ICD-10-CM

## 2019-03-11 DIAGNOSIS — Z00.8 ENCOUNTER FOR OTHER GENERAL EXAMINATION: ICD-10-CM

## 2019-03-11 DIAGNOSIS — Z90.49 ACQUIRED ABSENCE OF OTHER SPECIFIED PARTS OF DIGESTIVE TRACT: Chronic | ICD-10-CM

## 2019-03-11 PROCEDURE — 71260 CT THORAX DX C+: CPT | Mod: 26

## 2019-03-11 PROCEDURE — 82565 ASSAY OF CREATININE: CPT

## 2019-03-11 PROCEDURE — 74177 CT ABD & PELVIS W/CONTRAST: CPT

## 2019-03-11 PROCEDURE — 74177 CT ABD & PELVIS W/CONTRAST: CPT | Mod: 26

## 2019-03-11 PROCEDURE — 71260 CT THORAX DX C+: CPT

## 2019-03-15 ENCOUNTER — APPOINTMENT (OUTPATIENT)
Dept: HEMATOLOGY ONCOLOGY | Facility: CLINIC | Age: 79
End: 2019-03-15
Payer: MEDICARE

## 2019-03-15 VITALS
HEIGHT: 67 IN | SYSTOLIC BLOOD PRESSURE: 111 MMHG | DIASTOLIC BLOOD PRESSURE: 72 MMHG | HEART RATE: 68 BPM | WEIGHT: 166 LBS | BODY MASS INDEX: 26.06 KG/M2 | TEMPERATURE: 97.5 F

## 2019-03-15 DIAGNOSIS — Z86.79 PERSONAL HISTORY OF OTHER DISEASES OF THE CIRCULATORY SYSTEM: ICD-10-CM

## 2019-03-15 PROCEDURE — 99214 OFFICE O/P EST MOD 30 MIN: CPT

## 2019-03-15 NOTE — REASON FOR VISIT
[Follow-Up Visit] : a follow-up [Spouse] : spouse [FreeTextEntry2] : esophageal  cancer,  s/p  wedge lung resection of lung mets, s/p" second adjuvant" chemotx XelOx

## 2019-03-15 NOTE — ASSESSMENT
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 \par S/p neoadjuvant chemoRT ( taxol/ carboplatin), s/p  R0 surgical resection on 11/11/15 for residual T2, N2 disease. \par S/p 2 additional cycles of systemic chemotherapy with Taxol and carboplatin.\par \par In 2017 developed low volume  multiple pulmonary metastases- s/p wedge resection.  \par Visible residual metastatic disease on scans is minimal if any ( small right sided pulmonary nodules stable since 2011 are likely benign). \par \par S/p " second adjuvant"  Xeloda/ Oxaliplatin- completed in February.\par \par Clinically doing well. No evidence of recurrence.\par \par Continue to monitor clinically.\par CT scans ( CAP with contrast) every 6 months-  will be due  in September 2019.\par \par \par Continue B 12- level was good.\par \par \par Physical exam after scans/ sooner PRN.

## 2019-03-15 NOTE — HISTORY OF PRESENT ILLNESS
[Disease: _____________________] : Disease: [unfilled] [N: ___] : N[unfilled] [de-identified] : Presented in 2015 with chest/ epigastric pain.EGD on 7/24/15 showed malignant ulcerated mass in lower third of esophagus. Biopsy c/w infiltrating moderately differentiated adenocarcinoma. EUS :  T3 N2 nonobstructing distal esophageal cancer . PET/CT no evidence of distant mets. S/p preoperative chemoRT with Taxol/ Carboplatin. On 11/11/15 he underwent left  VATS esophag[facility]ast[facility].  \par SUrgical pathology showed  residual 2.5 mm single focus  of moderately differentiated  adenocarcinoma invading muscularis propria, three  of 27 LN with metastatic disease.  Received 2 additional cycles of systemic adjuvant chemotherapy with Taxol and carboplatin- completed 2/19/16.\par \par Surveillance scans showed tiny bilateral  pulmonary nodules. Right lung nodules were stable but left lung nodules- enlarges on follow up scans.\par On 8/25/17 he underwent wedge resection of two left lung pulmonary nodules ( Dr Luis Antonio Curtis [facility]) \par \par Pathology showed metastatic adenocarcinoma compatible with esophageal primary: 9 mm and 8 mm tumors with necrosis. 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  \par \par \par \par Had follow up CT scans CAP on  3/15/19 - YURY, two small pulmonary  nodules ( RLL and LLL) unchanged from Feb 2018. [de-identified] : moderately differentiated adenocarcinoma [de-identified] : HER 2 negative [de-identified] : endoscopic staging T3, N2\par surgical staging after neoadjuvant treatment : T2, N2  ( 3/27) [de-identified] : Feels well. Neuropathy resolved. \par No GI issues. Weight stable. \par No pulmonary complaints. \par EGD/ colonoscopy ~ March 2018 ( Dr David) EGD- clear, small colon polyp removed. \par Takes OTC vit B 12 daily. \par \par Mediport removed Sept 2018.\par Had ablation for A Flutter in November 2018.

## 2019-05-02 ENCOUNTER — RECORD ABSTRACTING (OUTPATIENT)
Age: 79
End: 2019-05-02

## 2019-05-02 DIAGNOSIS — Z78.9 OTHER SPECIFIED HEALTH STATUS: ICD-10-CM

## 2019-05-02 DIAGNOSIS — K44.9 DIAPHRAGMATIC HERNIA W/OUT OBSTRUCTION OR GANGRENE: ICD-10-CM

## 2019-05-02 DIAGNOSIS — Z83.79 FAMILY HISTORY OF OTHER DISEASES OF THE DIGESTIVE SYSTEM: ICD-10-CM

## 2019-05-02 RX ORDER — MELOXICAM 7.5 MG/1
7.5 TABLET ORAL DAILY
Refills: 0 | Status: ACTIVE | COMMUNITY

## 2019-05-13 ENCOUNTER — APPOINTMENT (OUTPATIENT)
Dept: GASTROENTEROLOGY | Facility: AMBULATORY MEDICAL SERVICES | Age: 79
End: 2019-05-13
Payer: MEDICARE

## 2019-05-13 PROCEDURE — 43235 EGD DIAGNOSTIC BRUSH WASH: CPT

## 2019-07-22 ENCOUNTER — RX RENEWAL (OUTPATIENT)
Age: 79
End: 2019-07-22

## 2019-09-09 ENCOUNTER — OTHER (OUTPATIENT)
Age: 79
End: 2019-09-09

## 2019-09-12 ENCOUNTER — FORM ENCOUNTER (OUTPATIENT)
Age: 79
End: 2019-09-12

## 2019-09-13 ENCOUNTER — APPOINTMENT (OUTPATIENT)
Dept: CT IMAGING | Facility: CLINIC | Age: 79
End: 2019-09-13
Payer: MEDICARE

## 2019-09-13 ENCOUNTER — OUTPATIENT (OUTPATIENT)
Dept: OUTPATIENT SERVICES | Facility: HOSPITAL | Age: 79
LOS: 1 days | End: 2019-09-13
Payer: MEDICARE

## 2019-09-13 DIAGNOSIS — S62.101A FRACTURE OF UNSPECIFIED CARPAL BONE, RIGHT WRIST, INITIAL ENCOUNTER FOR CLOSED FRACTURE: Chronic | ICD-10-CM

## 2019-09-13 DIAGNOSIS — Z98.41 CATARACT EXTRACTION STATUS, RIGHT EYE: Chronic | ICD-10-CM

## 2019-09-13 DIAGNOSIS — Z98.1 ARTHRODESIS STATUS: Chronic | ICD-10-CM

## 2019-09-13 DIAGNOSIS — Z98.890 OTHER SPECIFIED POSTPROCEDURAL STATES: Chronic | ICD-10-CM

## 2019-09-13 DIAGNOSIS — Z87.19 PERSONAL HISTORY OF OTHER DISEASES OF THE DIGESTIVE SYSTEM: Chronic | ICD-10-CM

## 2019-09-13 DIAGNOSIS — Z90.89 ACQUIRED ABSENCE OF OTHER ORGANS: Chronic | ICD-10-CM

## 2019-09-13 DIAGNOSIS — Z85.118 PERSONAL HISTORY OF OTHER MALIGNANT NEOPLASM OF BRONCHUS AND LUNG: Chronic | ICD-10-CM

## 2019-09-13 DIAGNOSIS — Z41.9 ENCOUNTER FOR PROCEDURE FOR PURPOSES OTHER THAN REMEDYING HEALTH STATE, UNSPECIFIED: Chronic | ICD-10-CM

## 2019-09-13 DIAGNOSIS — Z00.8 ENCOUNTER FOR OTHER GENERAL EXAMINATION: ICD-10-CM

## 2019-09-13 DIAGNOSIS — Z90.49 ACQUIRED ABSENCE OF OTHER SPECIFIED PARTS OF DIGESTIVE TRACT: Chronic | ICD-10-CM

## 2019-09-13 PROCEDURE — 74177 CT ABD & PELVIS W/CONTRAST: CPT | Mod: 26

## 2019-09-13 PROCEDURE — 71260 CT THORAX DX C+: CPT | Mod: 26

## 2019-09-13 PROCEDURE — 71260 CT THORAX DX C+: CPT

## 2019-09-13 PROCEDURE — 82565 ASSAY OF CREATININE: CPT

## 2019-09-13 PROCEDURE — 74177 CT ABD & PELVIS W/CONTRAST: CPT

## 2019-09-18 ENCOUNTER — APPOINTMENT (OUTPATIENT)
Dept: HEMATOLOGY ONCOLOGY | Facility: CLINIC | Age: 79
End: 2019-09-18
Payer: MEDICARE

## 2019-09-18 VITALS
RESPIRATION RATE: 12 BRPM | HEART RATE: 57 BPM | SYSTOLIC BLOOD PRESSURE: 178 MMHG | WEIGHT: 168 LBS | HEIGHT: 67 IN | BODY MASS INDEX: 26.37 KG/M2 | TEMPERATURE: 97.6 F | DIASTOLIC BLOOD PRESSURE: 78 MMHG

## 2019-09-18 PROCEDURE — 99214 OFFICE O/P EST MOD 30 MIN: CPT

## 2019-09-18 NOTE — ASSESSMENT
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 \par S/p neoadjuvant chemoRT ( taxol/ carboplatin), s/p  R0 surgical resection on 11/11/15 for residual T2, N2 disease. \par S/p 2 additional cycles of systemic chemotherapy with Taxol and carboplatin.\par \par In 2017 developed low volume  multiple pulmonary metastases- s/p wedge resection.  \par Visible residual metastatic disease on scans is minimal if any ( small right sided pulmonary nodules stable since 2011 are likely benign). \par \par S/p " second adjuvant"  Xeloda/ Oxaliplatin- completed in February.\par \par Clinically doing well. No evidence of recurrence.\par \par Continue to monitor clinically.\par CT scans ( CAP with contrast) every 6 months-  will be due  in March 2020.\par \par \par Physical exam after scans/ sooner PRN.

## 2019-09-18 NOTE — HISTORY OF PRESENT ILLNESS
[Disease: _____________________] : Disease: [unfilled] [N: ___] : N[unfilled] [de-identified] : Presented in 2015 with chest/ epigastric pain.EGD on 7/24/15 showed malignant ulcerated mass in lower third of esophagus. Biopsy c/w infiltrating moderately differentiated adenocarcinoma. EUS :  T3 N2 nonobstructing distal esophageal cancer . PET/CT no evidence of distant mets. S/p preoperative chemoRT with Taxol/ Carboplatin. On 11/11/15 he underwent left  VATS esophagogastrectomy with abdominal lymphadenectomy at [facility].  \par SUrgical pathology showed  residual 2.5 mm single focus  of moderately differentiated  adenocarcinoma invading muscularis propria, three  of 27 LN with metastatic disease.  Received 2 additional cycles of systemic adjuvant chemotherapy with Taxol and carboplatin- completed 2/19/16.\par \par Surveillance scans showed tiny bilateral  pulmonary nodules. Right lung nodules were stable but left lung nodules- enlarges on follow up scans.\par On 8/25/17 he underwent wedge resection of two left lung pulmonary nodules ( Dr Luis Antonio Curtis [facility]) \par \par Pathology showed metastatic adenocarcinoma compatible with esophageal primary: 9 mm and 8 mm tumors with necrosis. 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  \par \par \par \par Had follow up CT scans CAP on  3/15/19 - YURY, two small pulmonary  nodules ( RLL and LLL) unchanged from Feb 2018.  CT CAP 9/13/19 no change compared to [facility] 2019- YURY. [de-identified] : moderately differentiated adenocarcinoma [de-identified] : HER 2 negative [de-identified] : endoscopic staging T3, N2\par surgical staging after neoadjuvant treatment : T2, N2  ( 3/27) [de-identified] : Feels well. Neuropathy resolved. \par Has NO complaints.\par \par

## 2019-09-21 ENCOUNTER — EMERGENCY (EMERGENCY)
Facility: HOSPITAL | Age: 79
LOS: 0 days | Discharge: HOME | End: 2019-09-21
Attending: EMERGENCY MEDICINE
Payer: MEDICARE

## 2019-09-21 VITALS — WEIGHT: 160.06 LBS | HEIGHT: 67 IN

## 2019-09-21 VITALS
HEART RATE: 76 BPM | DIASTOLIC BLOOD PRESSURE: 82 MMHG | TEMPERATURE: 98 F | OXYGEN SATURATION: 99 % | SYSTOLIC BLOOD PRESSURE: 138 MMHG | RESPIRATION RATE: 16 BRPM

## 2019-09-21 DIAGNOSIS — Z90.89 ACQUIRED ABSENCE OF OTHER ORGANS: Chronic | ICD-10-CM

## 2019-09-21 DIAGNOSIS — Z98.890 OTHER SPECIFIED POSTPROCEDURAL STATES: Chronic | ICD-10-CM

## 2019-09-21 DIAGNOSIS — N40.0 BENIGN PROSTATIC HYPERPLASIA WITHOUT LOWER URINARY TRACT SYMPTOMS: ICD-10-CM

## 2019-09-21 DIAGNOSIS — Z87.19 PERSONAL HISTORY OF OTHER DISEASES OF THE DIGESTIVE SYSTEM: Chronic | ICD-10-CM

## 2019-09-21 DIAGNOSIS — S89.82XA OTHER SPECIFIED INJURIES OF LEFT LOWER LEG, INITIAL ENCOUNTER: ICD-10-CM

## 2019-09-21 DIAGNOSIS — Z98.1 ARTHRODESIS STATUS: Chronic | ICD-10-CM

## 2019-09-21 DIAGNOSIS — Z85.01 PERSONAL HISTORY OF MALIGNANT NEOPLASM OF ESOPHAGUS: ICD-10-CM

## 2019-09-21 DIAGNOSIS — I48.92 UNSPECIFIED ATRIAL FLUTTER: ICD-10-CM

## 2019-09-21 DIAGNOSIS — M25.562 PAIN IN LEFT KNEE: ICD-10-CM
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
## 2019-09-21 DIAGNOSIS — Z79.01 LONG TERM (CURRENT) USE OF ANTICOAGULANTS: ICD-10-CM

## 2019-09-21 DIAGNOSIS — Z41.9 ENCOUNTER FOR PROCEDURE FOR PURPOSES OTHER THAN REMEDYING HEALTH STATE, UNSPECIFIED: Chronic | ICD-10-CM
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
## 2019-09-21 DIAGNOSIS — I10 ESSENTIAL (PRIMARY) HYPERTENSION: ICD-10-CM

## 2019-09-21 DIAGNOSIS — S09.90XA UNSPECIFIED INJURY OF HEAD, INITIAL ENCOUNTER: ICD-10-CM

## 2019-09-21 DIAGNOSIS — Z85.118 PERSONAL HISTORY OF OTHER MALIGNANT NEOPLASM OF BRONCHUS AND LUNG: Chronic | ICD-10-CM

## 2019-09-21 DIAGNOSIS — K21.9 GASTRO-ESOPHAGEAL REFLUX DISEASE WITHOUT ESOPHAGITIS: ICD-10-CM
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
## 2019-09-21 DIAGNOSIS — Z87.19 PERSONAL HISTORY OF OTHER DISEASES OF THE DIGESTIVE SYSTEM: ICD-10-CM
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
## 2019-09-21 DIAGNOSIS — Z79.899 OTHER LONG TERM (CURRENT) DRUG THERAPY: ICD-10-CM

## 2019-09-21 DIAGNOSIS — Z90.49 ACQUIRED ABSENCE OF OTHER SPECIFIED PARTS OF DIGESTIVE TRACT: Chronic | ICD-10-CM

## 2019-09-21 DIAGNOSIS — Z98.41 CATARACT EXTRACTION STATUS, RIGHT EYE: Chronic | ICD-10-CM

## 2019-09-21 DIAGNOSIS — C78.00 SECONDARY MALIGNANT NEOPLASM OF UNSPECIFIED LUNG: ICD-10-CM

## 2019-09-21 DIAGNOSIS — E78.5 HYPERLIPIDEMIA, UNSPECIFIED: ICD-10-CM

## 2019-09-21 DIAGNOSIS — S05.11XA CONTUSION OF EYEBALL AND ORBITAL TISSUES, RIGHT EYE, INITIAL ENCOUNTER: ICD-10-CM

## 2019-09-21 DIAGNOSIS — M25.511 PAIN IN RIGHT SHOULDER: ICD-10-CM

## 2019-09-21 DIAGNOSIS — S49.81XA OTHER SPECIFIED INJURIES OF RIGHT SHOULDER AND UPPER ARM, INITIAL ENCOUNTER: ICD-10-CM

## 2019-09-21 DIAGNOSIS — S62.101A FRACTURE OF UNSPECIFIED CARPAL BONE, RIGHT WRIST, INITIAL ENCOUNTER FOR CLOSED FRACTURE: Chronic | ICD-10-CM

## 2019-09-21 PROCEDURE — 70480 CT ORBIT/EAR/FOSSA W/O DYE: CPT | Mod: XU

## 2019-09-21 PROCEDURE — 73564 X-RAY EXAM KNEE 4 OR MORE: CPT | Mod: 26,LT

## 2019-09-21 PROCEDURE — 99284 EMERGENCY DEPT VISIT MOD MDM: CPT | Mod: 25

## 2019-09-21 PROCEDURE — 76376 3D RENDER W/INTRP POSTPROCES: CPT | Mod: 26

## 2019-09-21 PROCEDURE — 70450 CT HEAD/BRAIN W/O DYE: CPT

## 2019-09-21 PROCEDURE — 70450 CT HEAD/BRAIN W/O DYE: CPT | Mod: 26

## 2019-09-21 PROCEDURE — 73562 X-RAY EXAM OF KNEE 3: CPT | Mod: LT

## 2019-09-21 PROCEDURE — 73564 X-RAY EXAM KNEE 4 OR MORE: CPT | Mod: LT

## 2019-09-21 PROCEDURE — 73030 X-RAY EXAM OF SHOULDER: CPT | Mod: 26,RT

## 2019-09-21 PROCEDURE — 70480 CT ORBIT/EAR/FOSSA W/O DYE: CPT | Mod: 26,59

## 2019-09-21 PROCEDURE — 73030 X-RAY EXAM OF SHOULDER: CPT | Mod: RT

## 2019-09-21 PROCEDURE — 76376 3D RENDER W/INTRP POSTPROCES: CPT

## 2019-09-21 PROCEDURE — 99284 EMERGENCY DEPT VISIT MOD MDM: CPT

## 2019-09-21 RX ORDER — IBUPROFEN 200 MG
600 TABLET ORAL ONCE
Refills: 0 | Status: COMPLETED | OUTPATIENT
Start: 2019-09-21 | End: 2019-09-21

## 2019-09-21 RX ADMIN — Medication 600 MILLIGRAM(S): at 14:45

## 2019-09-21 NOTE — ED STATDOCS - ATTENDING CONTRIBUTION TO CARE
I, Toni Veloz, performed the initial face to face bedside interview with this patient regarding history of present illness, review of symptoms and relevant past medical, social and family history.  I completed an independent physical examination.  I was the initial provider who evaluated this patient. I have signed out the follow up of any pending tests (i.e. labs, radiological studies) to the ACP.  I have communicated the patient’s plan of care and disposition with the ACP.  The history, relevant review of systems, past medical and surgical history, medical decision making, and physical examination was documented by the scribe in my presence and I attest to the accuracy of the documentation.

## 2019-09-21 NOTE — ED STATDOCS - CARE PROVIDER_API CALL
Forrest Kim (DO)  Orthopaedic Surgery  [address] [place] [address]  Phone: [phone]  Fax: [phone]  Follow Up Time:

## 2019-09-21 NOTE — ED STATDOCS - CARE PLAN
Principal Discharge DX:	Minor head injury  Secondary Diagnosis:	Periorbital contusion  Secondary Diagnosis:	Shoulder injury  Secondary Diagnosis:	Knee injury

## 2019-09-21 NOTE — ED STATDOCS - PROGRESS NOTE DETAILS
Patient re-examined and re-evaluated. Patient feels much better at this time. ED evaluation, Diagnosis and management discussed with the patient and family in detail. Workup results discussed with ED attending, OK to dc home.  Close PMD follow up encouraged.  Strict ED return instructions discussed in detail and patient given the opportunity to ask any questions about their discharge diagnosis and instructions. Patient verbalized understanding. ~ YODIT Mills

## 2019-09-21 NOTE — ED STATDOCS - PATIENT PORTAL LINK FT
You can access the FollowMyHealth Patient Portal offered by [facility] by registering at the following website: [email or web address]. By joining [email or web address] FollowMyHealth portal, you will also be able to view your health information using other applications (apps) compatible with our system.

## 2019-09-24 ENCOUNTER — APPOINTMENT (OUTPATIENT)
Dept: ORTHOPEDIC SURGERY | Facility: CLINIC | Age: 79
End: 2019-09-24
Payer: MEDICARE

## 2019-09-24 VITALS
WEIGHT: 168 LBS | SYSTOLIC BLOOD PRESSURE: 145 MMHG | DIASTOLIC BLOOD PRESSURE: 82 MMHG | HEIGHT: 67 IN | BODY MASS INDEX: 26.37 KG/M2 | HEART RATE: 63 BPM

## 2019-09-24 PROCEDURE — 99204 OFFICE O/P NEW MOD 45 MIN: CPT

## 2019-09-24 NOTE — ASSESSMENT
[FreeTextEntry1] : Patient with right shoulder pain consistent with a traumatic rotator cuff injury. The nature of this injury was described at length with the patient and he verbalized understanding. He will be started with physical therapy. He will continue Meloxicam, but increase to twice daily for the next 2 weeks. We recommend an MRI because of the mechanism of injury and his prior history of RTC repair. We will call him with the results of the MRI.

## 2019-09-24 NOTE — HISTORY OF PRESENT ILLNESS
[FreeTextEntry1] : 09/24/2019: The patient is a 78-year-old right-hand-dominant male presents to the office today with right shoulder pain. He had a mechanical fall this past Friday night onto his right side landing directly on her shoulder. The patient also had an injury to her right side of his face. He now complains of pain that is sharp in nature and only with movement. Pain is sharp in nature located along the lateral aspect of the shoulder. It is worse with abduction. Of note the patient had a prior rotator cuff repair in 2006 with Dr. Armendariz. He denies any paresthesias. He takes meloxicam daily.

## 2019-09-24 NOTE — PHYSICAL EXAM
[Normal] : Oriented to person, place, and time, insight and judgement were intact and the affect was normal [de-identified] : Right Shoulder Exam\par \par Inspection: No malalignment, No defects\par Skin: No masses, No lesions\par Neck: Negative Spurlings, full ROM without pain\par ROM: Full range of motion of the right shoulder with forward flexion, abduction, internal and external rotation\par Painful arc ROM: With abduction and forward flexion\par Tenderness: No bicipital tenderness, no tenderness to the greater tuberosity/RTC insertion, no anterior shoulder/lesser tuberosity tenderness\par Strength: 5/5 ER, 5/5 IR in adduction, 5/5 supraspinatus testing\par AC Joint: No ttp, no pain with cross arm testing\par Biceps: Speed negative, Yergusons negative\par Impingement test: Positive Taylor\par Stability: Negative apprehension, mildly positive anterior/posterior load and shift\par Vasc: 2+ radial pulse\par Neuro: AIN, PIN, Ulnar nerve in tact to motor\par Sensation: In tact to light touch throughout\par \par  [de-identified] : 3 x-ray views of the right shoulder were reviewed from an outside institution. There are no fractures or dislocations noted. There are 3 anchors noted in the proximal humerus indicative of prior rotator cuff repair.

## 2019-09-25 ENCOUNTER — FORM ENCOUNTER (OUTPATIENT)
Age: 79
End: 2019-09-25

## 2019-09-25 ENCOUNTER — APPOINTMENT (OUTPATIENT)
Dept: ORTHOPEDIC SURGERY | Facility: CLINIC | Age: 79
End: 2019-09-25
Payer: MEDICARE

## 2019-09-25 VITALS
HEIGHT: 67 IN | DIASTOLIC BLOOD PRESSURE: 85 MMHG | HEART RATE: 68 BPM | SYSTOLIC BLOOD PRESSURE: 139 MMHG | WEIGHT: 168 LBS | BODY MASS INDEX: 26.37 KG/M2

## 2019-09-25 PROCEDURE — 99214 OFFICE O/P EST MOD 30 MIN: CPT

## 2019-09-25 NOTE — DISCUSSION/SUMMARY
[de-identified] : Assessment: Left knee injury status post accidental fall.\par \par Plan:\par At this point in time I would like to get an MRI to make sure we are not missing a dead bone bruise in the knee versus a fracture in the knee. He will wear a knee support sleeve in the interim. He can take over-the-counter anti-inflammatories and Tylenol as needed. We will call him with the MRI results are in. All of his questions were answered and he is on board with this treatment course.

## 2019-09-25 NOTE — PHYSICAL EXAM
[de-identified] : Laterality: Left knee\par \par General: Alert and oriented x3.  In no acute distress.  Pleasant in nature with a normal affect.  No apparent respiratory distress. \par \par Erythema, Warmth, Rubor: Negative\par Swelling/Edema: Mild swelling and bruising present. Most of the bruising is middle lower leg.\par ROM: 0-110 degrees.  Pain past 110° of flexion.\par Jason's Test: Positive\par Medial Joint Line TTP: Positive\par Lateral Joint Line TTP: Negative\par Lachman Exam/Anterior Drawer/Pivot Shift Test: Negative \par MCL Pain: Negative\par LCL Pain: Negative\par Iliotibial Band Pain: Negative\par Patellofemoral Joint Pain: Negative\par Pes Anserinus TTP: Negative\par Homans Sign: Negative\par Neurovascularly: Intact with no sensory or motor deficits\par \par *Positive tenderness when touching the medial tibial plateau and medial femoral condyle.\par  [de-identified] : X-rays left knee taken at [facility] show no fractures or dislocations present. Mild osteoarthritis within the knee.

## 2019-09-25 NOTE — HISTORY OF PRESENT ILLNESS
[de-identified] : Chang is a 78-year-old male who presents in office with an acute injury to his left knee after she fell on September 20, 2019. He slipped on jayne material and fell. During the fall he did suffer a black eye, a right shoulder injury, and a left knee injury with bruising down the left leg. He did go to [facility] where he was evaluated and he did see an upper extremity specialist for his right shoulder. His left knee is causing him 5/10 pain and he cannot flex the knee to full flexion. He is walking with a slight limp without assistance. He has no other complaints today in the office.

## 2019-09-26 ENCOUNTER — APPOINTMENT (OUTPATIENT)
Dept: MRI IMAGING | Facility: CLINIC | Age: 79
End: 2019-09-26
Payer: MEDICARE

## 2019-09-26 ENCOUNTER — OUTPATIENT (OUTPATIENT)
Dept: OUTPATIENT SERVICES | Facility: HOSPITAL | Age: 79
LOS: 1 days | End: 2019-09-26
Payer: MEDICARE

## 2019-09-26 DIAGNOSIS — S46.001D UNSPECIFIED INJURY OF MUSCLE(S) AND TENDON(S) OF THE ROTATOR CUFF OF RIGHT SHOULDER, SUBSEQUENT ENCOUNTER: ICD-10-CM

## 2019-09-26 DIAGNOSIS — S62.101A FRACTURE OF UNSPECIFIED CARPAL BONE, RIGHT WRIST, INITIAL ENCOUNTER FOR CLOSED FRACTURE: Chronic | ICD-10-CM

## 2019-09-26 DIAGNOSIS — Z90.89 ACQUIRED ABSENCE OF OTHER ORGANS: Chronic | ICD-10-CM
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
## 2019-09-26 DIAGNOSIS — Z98.1 ARTHRODESIS STATUS: Chronic | ICD-10-CM

## 2019-09-26 DIAGNOSIS — Z98.41 CATARACT EXTRACTION STATUS, RIGHT EYE: Chronic | ICD-10-CM

## 2019-09-26 DIAGNOSIS — M25.562 PAIN IN LEFT KNEE: ICD-10-CM

## 2019-09-26 DIAGNOSIS — Z00.8 ENCOUNTER FOR OTHER GENERAL EXAMINATION: ICD-10-CM

## 2019-09-26 DIAGNOSIS — Z98.890 OTHER SPECIFIED POSTPROCEDURAL STATES: Chronic | ICD-10-CM

## 2019-09-26 DIAGNOSIS — Z85.118 PERSONAL HISTORY OF OTHER MALIGNANT NEOPLASM OF BRONCHUS AND LUNG: Chronic | ICD-10-CM

## 2019-09-26 DIAGNOSIS — Z87.19 PERSONAL HISTORY OF OTHER DISEASES OF THE DIGESTIVE SYSTEM: Chronic | ICD-10-CM

## 2019-09-26 DIAGNOSIS — Z90.49 ACQUIRED ABSENCE OF OTHER SPECIFIED PARTS OF DIGESTIVE TRACT: Chronic | ICD-10-CM

## 2019-09-26 DIAGNOSIS — M23.92 UNSPECIFIED INTERNAL DERANGEMENT OF LEFT KNEE: ICD-10-CM

## 2019-09-26 DIAGNOSIS — Z41.9 ENCOUNTER FOR PROCEDURE FOR PURPOSES OTHER THAN REMEDYING HEALTH STATE, UNSPECIFIED: Chronic | ICD-10-CM

## 2019-09-26 PROCEDURE — 73221 MRI JOINT UPR EXTREM W/O DYE: CPT | Mod: 26,RT

## 2019-09-26 PROCEDURE — 73721 MRI JNT OF LWR EXTRE W/O DYE: CPT | Mod: 26,LT

## 2019-09-26 PROCEDURE — 73721 MRI JNT OF LWR EXTRE W/O DYE: CPT

## 2019-09-26 PROCEDURE — 73221 MRI JOINT UPR EXTREM W/O DYE: CPT

## 2019-10-21 ENCOUNTER — RX RENEWAL (OUTPATIENT)
Age: 79
End: 2019-10-21

## 2019-11-13 ENCOUNTER — APPOINTMENT (OUTPATIENT)
Age: 79
End: 2019-11-13
Payer: MEDICARE

## 2019-11-13 PROCEDURE — 99213 OFFICE O/P EST LOW 20 MIN: CPT

## 2019-11-13 NOTE — HISTORY OF PRESENT ILLNESS
[de-identified] : Chang is a 79-year-old male who presents for followup of his left knee and to review the MRI of his left knee which he had done on September 26, 2019. Today he has 1/10 pain and states that he feels much better than his last office visit. He is walking without assistance, without braces, and with regular shoes. He denies locking and catching of the left knee. He has no numbness or tingling going down his left lower extremity. No other complaints in office.

## 2019-11-13 NOTE — DISCUSSION/SUMMARY
[de-identified] : Assessment: Left knee injury.\par \par Plan:\par At this point I reviewed the MRI and answered all of his questions in regards to the MRI. She has minimal to no pain today in the office. At this point in time he can resume his normal activities. He can also followup on an as-needed basis.

## 2019-11-13 NOTE — PHYSICAL EXAM
[de-identified] : Laterality: Left knee\par \par General: Alert and oriented x3.  In no acute distress.  Pleasant in nature with a normal affect.  No apparent respiratory distress. \par \par Erythema, Warmth, Rubor: Negative\par Swelling/Edema: Negative \par ROM: 0-130 degrees\par Jason's Test: positive\par Medial Joint Line TTP: positive\par Lateral Joint Line TTP: Negative\par Lachman Exam/Anterior Drawer/Pivot Shift Test: Negative \par MCL Pain: Negative\par LCL Pain: Negative\par Iliotibial Band Pain: Negative\par Patellofemoral Joint Pain: Negative\par Pes Anserinus TTP: Negative\par Homans Sign: Negative\par Neurovascularly: Intact with no sensory or motor deficits\par  [de-identified] : MRI taken at [facility] on September 26, 2019 reviewed with the patient today showed a nondisplaced fracture at the posterior lateral tibial plateau. Mild bone contusions at the posterior nonweightbearing lateral femoral condyle and anterior medial tibial plateau. Grade 2 sprain of the medial collateral ligament. Obliquely oriented undersurface tearing at the posterior horn and posterior body of the medial meniscus.

## 2020-03-10 ENCOUNTER — RX RENEWAL (OUTPATIENT)
Age: 80
End: 2020-03-10

## 2020-03-12 ENCOUNTER — FORM ENCOUNTER (OUTPATIENT)
Age: 80
End: 2020-03-12

## 2020-03-12 ENCOUNTER — OUTPATIENT (OUTPATIENT)
Dept: OUTPATIENT SERVICES | Facility: HOSPITAL | Age: 80
LOS: 1 days | Discharge: HOME | End: 2020-03-12

## 2020-03-12 DIAGNOSIS — Z98.890 OTHER SPECIFIED POSTPROCEDURAL STATES: Chronic | ICD-10-CM

## 2020-03-12 DIAGNOSIS — C15.9 MALIGNANT NEOPLASM OF ESOPHAGUS, UNSPECIFIED: ICD-10-CM

## 2020-03-12 DIAGNOSIS — Z85.118 PERSONAL HISTORY OF OTHER MALIGNANT NEOPLASM OF BRONCHUS AND LUNG: Chronic | ICD-10-CM

## 2020-03-12 DIAGNOSIS — Z90.49 ACQUIRED ABSENCE OF OTHER SPECIFIED PARTS OF DIGESTIVE TRACT: Chronic | ICD-10-CM

## 2020-03-12 DIAGNOSIS — Z98.1 ARTHRODESIS STATUS: Chronic | ICD-10-CM

## 2020-03-12 DIAGNOSIS — Z41.9 ENCOUNTER FOR PROCEDURE FOR PURPOSES OTHER THAN REMEDYING HEALTH STATE, UNSPECIFIED: Chronic | ICD-10-CM

## 2020-03-12 DIAGNOSIS — Z98.41 CATARACT EXTRACTION STATUS, RIGHT EYE: Chronic | ICD-10-CM

## 2020-03-12 DIAGNOSIS — Z90.89 ACQUIRED ABSENCE OF OTHER ORGANS: Chronic | ICD-10-CM

## 2020-03-12 DIAGNOSIS — S62.101A FRACTURE OF UNSPECIFIED CARPAL BONE, RIGHT WRIST, INITIAL ENCOUNTER FOR CLOSED FRACTURE: Chronic | ICD-10-CM

## 2020-03-12 DIAGNOSIS — Z87.19 PERSONAL HISTORY OF OTHER DISEASES OF THE DIGESTIVE SYSTEM: Chronic | ICD-10-CM

## 2020-03-13 ENCOUNTER — APPOINTMENT (OUTPATIENT)
Dept: CT IMAGING | Facility: CLINIC | Age: 80
End: 2020-03-13
Payer: MEDICARE

## 2020-03-13 ENCOUNTER — OUTPATIENT (OUTPATIENT)
Dept: OUTPATIENT SERVICES | Facility: HOSPITAL | Age: 80
LOS: 1 days | End: 2020-03-13
Payer: MEDICARE

## 2020-03-13 DIAGNOSIS — Z00.8 ENCOUNTER FOR OTHER GENERAL EXAMINATION: ICD-10-CM

## 2020-03-13 DIAGNOSIS — Z98.1 ARTHRODESIS STATUS: Chronic | ICD-10-CM

## 2020-03-13 DIAGNOSIS — Z87.19 PERSONAL HISTORY OF OTHER DISEASES OF THE DIGESTIVE SYSTEM: Chronic | ICD-10-CM

## 2020-03-13 DIAGNOSIS — Z41.9 ENCOUNTER FOR PROCEDURE FOR PURPOSES OTHER THAN REMEDYING HEALTH STATE, UNSPECIFIED: Chronic | ICD-10-CM

## 2020-03-13 DIAGNOSIS — Z98.41 CATARACT EXTRACTION STATUS, RIGHT EYE: Chronic | ICD-10-CM

## 2020-03-13 DIAGNOSIS — Z98.890 OTHER SPECIFIED POSTPROCEDURAL STATES: Chronic | ICD-10-CM

## 2020-03-13 DIAGNOSIS — Z90.89 ACQUIRED ABSENCE OF OTHER ORGANS: Chronic | ICD-10-CM

## 2020-03-13 DIAGNOSIS — Z85.118 PERSONAL HISTORY OF OTHER MALIGNANT NEOPLASM OF BRONCHUS AND LUNG: Chronic | ICD-10-CM

## 2020-03-13 DIAGNOSIS — C15.9 MALIGNANT NEOPLASM OF ESOPHAGUS, UNSPECIFIED: ICD-10-CM

## 2020-03-13 DIAGNOSIS — S62.101A FRACTURE OF UNSPECIFIED CARPAL BONE, RIGHT WRIST, INITIAL ENCOUNTER FOR CLOSED FRACTURE: Chronic | ICD-10-CM

## 2020-03-13 DIAGNOSIS — Z90.49 ACQUIRED ABSENCE OF OTHER SPECIFIED PARTS OF DIGESTIVE TRACT: Chronic | ICD-10-CM

## 2020-03-13 PROCEDURE — 74177 CT ABD & PELVIS W/CONTRAST: CPT

## 2020-03-13 PROCEDURE — 71260 CT THORAX DX C+: CPT

## 2020-03-13 PROCEDURE — 82565 ASSAY OF CREATININE: CPT

## 2020-03-13 PROCEDURE — 74177 CT ABD & PELVIS W/CONTRAST: CPT | Mod: 26

## 2020-03-13 PROCEDURE — 71260 CT THORAX DX C+: CPT | Mod: 26

## 2020-03-18 ENCOUNTER — APPOINTMENT (OUTPATIENT)
Dept: HEMATOLOGY ONCOLOGY | Facility: CLINIC | Age: 80
End: 2020-03-18

## 2020-04-17 ENCOUNTER — NON-APPOINTMENT (OUTPATIENT)
Age: 80
End: 2020-04-17

## 2020-04-17 ENCOUNTER — RX RENEWAL (OUTPATIENT)
Age: 80
End: 2020-04-17

## 2020-05-14 ENCOUNTER — APPOINTMENT (OUTPATIENT)
Dept: GASTROENTEROLOGY | Facility: CLINIC | Age: 80
End: 2020-05-14

## 2020-05-18 ENCOUNTER — RX RENEWAL (OUTPATIENT)
Age: 80
End: 2020-05-18

## 2020-09-08 ENCOUNTER — APPOINTMENT (OUTPATIENT)
Dept: HEMATOLOGY ONCOLOGY | Facility: CLINIC | Age: 80
End: 2020-09-08

## 2020-09-18 ENCOUNTER — RESULT REVIEW (OUTPATIENT)
Age: 80
End: 2020-09-18

## 2020-09-18 ENCOUNTER — APPOINTMENT (OUTPATIENT)
Dept: CT IMAGING | Facility: CLINIC | Age: 80
End: 2020-09-18
Payer: MEDICARE

## 2020-09-18 ENCOUNTER — OUTPATIENT (OUTPATIENT)
Dept: OUTPATIENT SERVICES | Facility: HOSPITAL | Age: 80
LOS: 1 days | End: 2020-09-18
Payer: MEDICARE

## 2020-09-18 DIAGNOSIS — Z98.41 CATARACT EXTRACTION STATUS, RIGHT EYE: Chronic | ICD-10-CM

## 2020-09-18 DIAGNOSIS — Z87.19 PERSONAL HISTORY OF OTHER DISEASES OF THE DIGESTIVE SYSTEM: Chronic | ICD-10-CM

## 2020-09-18 DIAGNOSIS — Z98.1 ARTHRODESIS STATUS: Chronic | ICD-10-CM

## 2020-09-18 DIAGNOSIS — Z90.49 ACQUIRED ABSENCE OF OTHER SPECIFIED PARTS OF DIGESTIVE TRACT: Chronic | ICD-10-CM

## 2020-09-18 DIAGNOSIS — S62.101A FRACTURE OF UNSPECIFIED CARPAL BONE, RIGHT WRIST, INITIAL ENCOUNTER FOR CLOSED FRACTURE: Chronic | ICD-10-CM

## 2020-09-18 DIAGNOSIS — C15.9 MALIGNANT NEOPLASM OF ESOPHAGUS, UNSPECIFIED: ICD-10-CM

## 2020-09-18 DIAGNOSIS — Z85.118 PERSONAL HISTORY OF OTHER MALIGNANT NEOPLASM OF BRONCHUS AND LUNG: Chronic | ICD-10-CM

## 2020-09-18 DIAGNOSIS — Z98.890 OTHER SPECIFIED POSTPROCEDURAL STATES: Chronic | ICD-10-CM

## 2020-09-18 DIAGNOSIS — Z90.89 ACQUIRED ABSENCE OF OTHER ORGANS: Chronic | ICD-10-CM

## 2020-09-18 DIAGNOSIS — Z41.9 ENCOUNTER FOR PROCEDURE FOR PURPOSES OTHER THAN REMEDYING HEALTH STATE, UNSPECIFIED: Chronic | ICD-10-CM

## 2020-09-18 PROCEDURE — 82565 ASSAY OF CREATININE: CPT

## 2020-09-18 PROCEDURE — 71260 CT THORAX DX C+: CPT

## 2020-09-18 PROCEDURE — 74177 CT ABD & PELVIS W/CONTRAST: CPT | Mod: 26

## 2020-09-18 PROCEDURE — 74177 CT ABD & PELVIS W/CONTRAST: CPT

## 2020-09-18 PROCEDURE — 71260 CT THORAX DX C+: CPT | Mod: 26

## 2020-09-19 DIAGNOSIS — S46.001D UNSPECIFIED INJURY OF MUSCLE(S) AND TENDON(S) OF THE ROTATOR CUFF OF RIGHT SHOULDER, SUBSEQUENT ENCOUNTER: ICD-10-CM

## 2020-09-19 DIAGNOSIS — S83.242A OTHER TEAR OF MEDIAL MENISCUS, CURRENT INJURY, LEFT KNEE, INITIAL ENCOUNTER: ICD-10-CM

## 2020-09-19 DIAGNOSIS — G47.30 SLEEP APNEA, UNSPECIFIED: ICD-10-CM

## 2020-09-19 DIAGNOSIS — R10.9 UNSPECIFIED ABDOMINAL PAIN: ICD-10-CM

## 2020-09-21 ENCOUNTER — OUTPATIENT (OUTPATIENT)
Dept: OUTPATIENT SERVICES | Facility: HOSPITAL | Age: 80
LOS: 1 days | Discharge: HOME | End: 2020-09-21

## 2020-09-21 DIAGNOSIS — C15.9 MALIGNANT NEOPLASM OF ESOPHAGUS, UNSPECIFIED: ICD-10-CM

## 2020-09-21 DIAGNOSIS — S62.101A FRACTURE OF UNSPECIFIED CARPAL BONE, RIGHT WRIST, INITIAL ENCOUNTER FOR CLOSED FRACTURE: Chronic | ICD-10-CM

## 2020-09-21 DIAGNOSIS — Z98.41 CATARACT EXTRACTION STATUS, RIGHT EYE: Chronic | ICD-10-CM

## 2020-09-21 DIAGNOSIS — Z90.49 ACQUIRED ABSENCE OF OTHER SPECIFIED PARTS OF DIGESTIVE TRACT: Chronic | ICD-10-CM

## 2020-09-21 DIAGNOSIS — Z87.19 PERSONAL HISTORY OF OTHER DISEASES OF THE DIGESTIVE SYSTEM: Chronic | ICD-10-CM

## 2020-09-21 DIAGNOSIS — Z98.890 OTHER SPECIFIED POSTPROCEDURAL STATES: Chronic | ICD-10-CM

## 2020-09-21 DIAGNOSIS — Z41.9 ENCOUNTER FOR PROCEDURE FOR PURPOSES OTHER THAN REMEDYING HEALTH STATE, UNSPECIFIED: Chronic | ICD-10-CM

## 2020-09-21 DIAGNOSIS — Z98.1 ARTHRODESIS STATUS: Chronic | ICD-10-CM

## 2020-09-21 DIAGNOSIS — Z85.118 PERSONAL HISTORY OF OTHER MALIGNANT NEOPLASM OF BRONCHUS AND LUNG: Chronic | ICD-10-CM

## 2020-09-21 DIAGNOSIS — Z90.89 ACQUIRED ABSENCE OF OTHER ORGANS: Chronic | ICD-10-CM

## 2020-09-23 ENCOUNTER — APPOINTMENT (OUTPATIENT)
Dept: HEMATOLOGY ONCOLOGY | Facility: CLINIC | Age: 80
End: 2020-09-23
Payer: MEDICARE

## 2020-09-23 VITALS
HEART RATE: 82 BPM | HEIGHT: 67 IN | WEIGHT: 158 LBS | DIASTOLIC BLOOD PRESSURE: 64 MMHG | TEMPERATURE: 97.4 F | BODY MASS INDEX: 24.8 KG/M2 | SYSTOLIC BLOOD PRESSURE: 100 MMHG | RESPIRATION RATE: 16 BRPM

## 2020-09-23 PROCEDURE — 99214 OFFICE O/P EST MOD 30 MIN: CPT

## 2020-09-23 RX ORDER — ELECTROLYTES/DEXTROSE
SOLUTION, ORAL ORAL DAILY
Refills: 0 | Status: ACTIVE | COMMUNITY
Start: 2020-09-23

## 2020-09-23 RX ORDER — MELOXICAM 7.5 MG/1
7.5 TABLET ORAL
Qty: 60 | Refills: 1 | Status: DISCONTINUED | COMMUNITY
Start: 2019-09-24 | End: 2020-09-23

## 2020-09-23 RX ORDER — CHOLECALCIFEROL (VITAMIN D3) 50 MCG
50 MCG TABLET ORAL
Refills: 0 | Status: ACTIVE | COMMUNITY

## 2020-09-23 RX ORDER — OXALIPLATIN 5 MG/ML
100 INJECTION, SOLUTION, CONCENTRATE INTRAVENOUS
Refills: 0 | Status: DISCONTINUED | COMMUNITY
End: 2020-09-23

## 2020-09-23 RX ORDER — MELOXICAM 7.5 MG/1
7.5 TABLET ORAL
Qty: 60 | Refills: 0 | Status: DISCONTINUED | COMMUNITY
Start: 2020-04-17 | End: 2020-09-23

## 2020-09-23 NOTE — HISTORY OF PRESENT ILLNESS
[Disease: _____________________] : Disease: [unfilled] [de-identified] : Presented in 2015 with chest/ epigastric pain.EGD on 7/24/15 showed malignant ulcerated mass in lower third of esophagus. Biopsy c/w infiltrating moderately differentiated adenocarcinoma. EUS :  T3 N2 nonobstructing distal esophageal cancer . PET/CT no evidence of distant mets. S/p preoperative chemoRT with Taxol/ Carboplatin. On 11/11/15 he underwent left  VATS esophagogastrectomy with abdominal lymphadenectomy at [facility].  \par SUrgical pathology showed  residual 2.5 mm single focus  of moderately differentiated  adenocarcinoma invading muscularis propria, three  of 27 LN with metastatic disease.  Received 2 additional cycles of systemic adjuvant chemotherapy with Taxol and carboplatin- completed 2/19/16.\par \par Surveillance scans showed tiny bilateral  pulmonary nodules. Right lung nodules were stable but left lung nodules- enlarges on follow up scans.\par On 8/25/17 he underwent wedge resection of two left lung pulmonary nodules ( Dr Luis Antonio [facility]) \par \par Pathology showed metastatic adenocarcinoma compatible with esophageal primary: 9 mm and 8 mm tumors with necrosis. 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  \par \par \par \par Had follow up CT scans CAP on  3/15/19 - YURY, two small pulmonary  nodules ( RLL and LLL) unchanged from Feb 2018. \par CT CAP 9/18/20 : YURY [de-identified] : moderately differentiated adenocarcinoma [de-identified] : HER 2 negative [de-identified] : endoscopic staging T3, N2\par surgical staging after neoadjuvant treatment : T2, N2  ( 3/27) [de-identified] : Feels well. Minimal residual neuropathy. Lost few lbs- intentional - watching carb intake because of prediabetes.\par No GI c/o. \par \par Labs by PCP in May 2020 normal Hgb with  ; B12/ folate normal . No cytopenias. \par \par

## 2020-09-23 NOTE — PHYSICAL EXAM
[Fully active, able to carry on all pre-disease performance without restriction] : Status 0 - Fully active, able to carry on all pre-disease performance without restriction [Normal] : affect appropriate [de-identified] : looks great

## 2020-09-23 NOTE — REVIEW OF SYSTEMS
[Patient Intake Form Reviewed] : Patient intake form was reviewed [Negative] : Endocrine [FreeTextEntry2] : few lbs - intentional  [de-identified] : minimal residual neuropathy

## 2020-09-23 NOTE — ASSESSMENT
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 \par S/p neoadjuvant chemoRT ( taxol/ carboplatin), s/p  R0 surgical resection on 11/11/15 for residual T2, N2 disease. \par S/p 2 additional cycles of systemic chemotherapy with Taxol and carboplatin.\par \par In 2017 developed low volume  multiple pulmonary metastases- s/p wedge resection.  \par \par S/p " second adjuvant"  Xeloda/ Oxaliplatin- completed in February 2018.\par \par Clinically doing well. Recent scans: YURY.\par \par Continue to monitor clinically.\par \par CT scans ( CAP with contrast) every 6 months-  will be due  in March 2021.\par \par \par Physical exam after scans/ sooner PRN.\par \par \par Will check with GI  Dr David if need for surveillance endoscopy ( prior done ~ May 2019) . \par \par Mildly elevated MCV on CBC from May - no cytopenias,  normal B12/ folate- not clinically significant at this point.

## 2021-01-12 ENCOUNTER — APPOINTMENT (OUTPATIENT)
Dept: UROLOGY | Facility: CLINIC | Age: 81
End: 2021-01-12
Payer: MEDICARE

## 2021-01-12 VITALS
TEMPERATURE: 97.2 F | HEART RATE: 73 BPM | WEIGHT: 149 LBS | SYSTOLIC BLOOD PRESSURE: 117 MMHG | BODY MASS INDEX: 23.39 KG/M2 | OXYGEN SATURATION: 98 % | HEIGHT: 67 IN | DIASTOLIC BLOOD PRESSURE: 75 MMHG

## 2021-01-12 PROCEDURE — 51798 US URINE CAPACITY MEASURE: CPT

## 2021-01-12 PROCEDURE — 99204 OFFICE O/P NEW MOD 45 MIN: CPT | Mod: 25

## 2021-01-12 NOTE — REVIEW OF SYSTEMS
[Genital bacterial infection] : genital bacterial infection [Heartburn] : heartburn [Genital yeast infection] : genital yeast infection [Sexually Transmitted Disease (STD)] : sexually transmitted disease [Wake up at night to urinate  How many times?  ___] : wakes up to urinate [unfilled] times during the night [Wait a long time to urinate] : waits a long time to urinate [Slow urine stream] : slow urine stream [Interrupted urine stream] : interrupted urine stream [Negative] : Heme/Lymph

## 2021-01-13 LAB
APPEARANCE: CLEAR
BACTERIA: NEGATIVE
BILIRUBIN URINE: NEGATIVE
BLOOD URINE: NEGATIVE
COLOR: YELLOW
GLUCOSE QUALITATIVE U: NEGATIVE
HYALINE CASTS: 0 /LPF
KETONES URINE: NORMAL
LEUKOCYTE ESTERASE URINE: NEGATIVE
MICROSCOPIC-UA: NORMAL
NITRITE URINE: NEGATIVE
PH URINE: 7
PROTEIN URINE: NORMAL
RED BLOOD CELLS URINE: 1 /HPF
SPECIFIC GRAVITY URINE: 1.03
SQUAMOUS EPITHELIAL CELLS: 0 /HPF
URINE COMMENTS: NORMAL
UROBILINOGEN URINE: ABNORMAL
WHITE BLOOD CELLS URINE: 1 /HPF

## 2021-01-13 NOTE — ASSESSMENT
[FreeTextEntry1] : 81 yo male with hx of BPH and gradually increasing PSA (currently around 3.08). \par The anatomy of the lower genitourinary tract was explained to the patient, with the urine passing through the bladder neck and prostatic urethra as it exits the body. Prostatic enlargement can constrict the lumen and the bladder outlet, causing incomplete bladder emptying and irritative symptoms such and frequency and urgency. Alpha blockers can be used to relax the bladder neck and facilitate passage of urine from the bladder. 5-alpha reductase inhibitors can be employed to shrink the prostate and thereby allow for passage of urine more easily. Anticholinergics to decrease bladder irritative symptoms were also discussed, but it was stressed that they can increase post void residual and risk urinary retention. Pt is currently on Alfuzosin 10 mg QD and does not wish to add another medication (finasteride) at this time as his symptoms are not too bothersome. \par Discussed with patient that PSA is imprecise test. PSA can be elevated due to benign prostate enlargement, prostate stimulation, sexual activity, urinary tract infection, prostatitis, as well as prostate cancer. There is no value for PSA which is diagnostic for prostate cancer, nor is there value which conclusively excludes prostate cancer. PSA simply stratifies risk for prostate cancer and diagnosis of prostate cancer requires prostate biopsy. At this given pt's gradual change in PSA there is no emergency for prostate biopsy and pt would like to repeat his PSA to continue to trend it. \par - UA/UCx\par - PSA and RBUS in 6 months\par - F/U in 6 months for symptom check or sooner PRN.

## 2021-01-13 NOTE — HISTORY OF PRESENT ILLNESS
[FreeTextEntry1] : 80 year old man seen 01/12/2021 with complaint of gradually increasing PSA and weak stream. This began over the years. Patient reports his PSA was around 1.49 in 2/2014 and now 3.08 in 9/2020 and pt reports it was around 3 on 12/7/20 (trend can be seen in outside medical record). Patient also notes some weak stream over time and reports he is on Alfuzosin 10 mg QHS.  \par It is moderate in severity. Nothing makes the symptoms better, nothing makes sx worse. \par It is associated with nothing.\par No hematuria, no dysuria, no frequency, no urgency, No incontinence. \par No fevers, no chills, no nausea, no vomiting, no flank pain. \par \par No family history contributory to prostate cancer or BPH.\par \par 1/12/21 PVR: 31 mL.

## 2021-01-14 LAB — BACTERIA UR CULT: NORMAL

## 2021-02-10 ENCOUNTER — APPOINTMENT (OUTPATIENT)
Dept: MRI IMAGING | Facility: CLINIC | Age: 81
End: 2021-02-10
Payer: MEDICARE

## 2021-02-10 ENCOUNTER — OUTPATIENT (OUTPATIENT)
Dept: OUTPATIENT SERVICES | Facility: HOSPITAL | Age: 81
LOS: 1 days | End: 2021-02-10
Payer: MEDICARE

## 2021-02-10 DIAGNOSIS — Z90.49 ACQUIRED ABSENCE OF OTHER SPECIFIED PARTS OF DIGESTIVE TRACT: Chronic | ICD-10-CM

## 2021-02-10 DIAGNOSIS — Z98.890 OTHER SPECIFIED POSTPROCEDURAL STATES: Chronic | ICD-10-CM

## 2021-02-10 DIAGNOSIS — Z41.9 ENCOUNTER FOR PROCEDURE FOR PURPOSES OTHER THAN REMEDYING HEALTH STATE, UNSPECIFIED: Chronic | ICD-10-CM

## 2021-02-10 DIAGNOSIS — Z00.8 ENCOUNTER FOR OTHER GENERAL EXAMINATION: ICD-10-CM

## 2021-02-10 DIAGNOSIS — S62.101A FRACTURE OF UNSPECIFIED CARPAL BONE, RIGHT WRIST, INITIAL ENCOUNTER FOR CLOSED FRACTURE: Chronic | ICD-10-CM

## 2021-02-10 DIAGNOSIS — Z98.41 CATARACT EXTRACTION STATUS, RIGHT EYE: Chronic | ICD-10-CM

## 2021-02-10 DIAGNOSIS — Z98.1 ARTHRODESIS STATUS: Chronic | ICD-10-CM

## 2021-02-10 DIAGNOSIS — Z90.89 ACQUIRED ABSENCE OF OTHER ORGANS: Chronic | ICD-10-CM

## 2021-02-10 DIAGNOSIS — Z85.118 PERSONAL HISTORY OF OTHER MALIGNANT NEOPLASM OF BRONCHUS AND LUNG: Chronic | ICD-10-CM

## 2021-02-10 DIAGNOSIS — Z87.19 PERSONAL HISTORY OF OTHER DISEASES OF THE DIGESTIVE SYSTEM: Chronic | ICD-10-CM

## 2021-02-10 PROCEDURE — A9585: CPT

## 2021-02-10 PROCEDURE — 72146 MRI CHEST SPINE W/O DYE: CPT

## 2021-02-10 PROCEDURE — 72146 MRI CHEST SPINE W/O DYE: CPT | Mod: 26,MH

## 2021-02-10 PROCEDURE — 72158 MRI LUMBAR SPINE W/O & W/DYE: CPT | Mod: 26,MH

## 2021-02-10 PROCEDURE — 72158 MRI LUMBAR SPINE W/O & W/DYE: CPT

## 2021-03-18 ENCOUNTER — APPOINTMENT (OUTPATIENT)
Dept: CT IMAGING | Facility: CLINIC | Age: 81
End: 2021-03-18
Payer: MEDICARE

## 2021-03-18 ENCOUNTER — OUTPATIENT (OUTPATIENT)
Dept: OUTPATIENT SERVICES | Facility: HOSPITAL | Age: 81
LOS: 1 days | End: 2021-03-18
Payer: MEDICARE

## 2021-03-18 ENCOUNTER — RESULT REVIEW (OUTPATIENT)
Age: 81
End: 2021-03-18

## 2021-03-18 DIAGNOSIS — Z87.19 PERSONAL HISTORY OF OTHER DISEASES OF THE DIGESTIVE SYSTEM: Chronic | ICD-10-CM

## 2021-03-18 DIAGNOSIS — Z90.49 ACQUIRED ABSENCE OF OTHER SPECIFIED PARTS OF DIGESTIVE TRACT: Chronic | ICD-10-CM

## 2021-03-18 DIAGNOSIS — Z98.890 OTHER SPECIFIED POSTPROCEDURAL STATES: Chronic | ICD-10-CM

## 2021-03-18 DIAGNOSIS — Z41.9 ENCOUNTER FOR PROCEDURE FOR PURPOSES OTHER THAN REMEDYING HEALTH STATE, UNSPECIFIED: Chronic | ICD-10-CM

## 2021-03-18 DIAGNOSIS — Z98.1 ARTHRODESIS STATUS: Chronic | ICD-10-CM

## 2021-03-18 DIAGNOSIS — Z00.8 ENCOUNTER FOR OTHER GENERAL EXAMINATION: ICD-10-CM

## 2021-03-18 DIAGNOSIS — Z90.89 ACQUIRED ABSENCE OF OTHER ORGANS: Chronic | ICD-10-CM

## 2021-03-18 DIAGNOSIS — Z98.41 CATARACT EXTRACTION STATUS, RIGHT EYE: Chronic | ICD-10-CM

## 2021-03-18 DIAGNOSIS — S62.101A FRACTURE OF UNSPECIFIED CARPAL BONE, RIGHT WRIST, INITIAL ENCOUNTER FOR CLOSED FRACTURE: Chronic | ICD-10-CM

## 2021-03-18 DIAGNOSIS — Z85.118 PERSONAL HISTORY OF OTHER MALIGNANT NEOPLASM OF BRONCHUS AND LUNG: Chronic | ICD-10-CM

## 2021-03-18 DIAGNOSIS — C15.9 MALIGNANT NEOPLASM OF ESOPHAGUS, UNSPECIFIED: ICD-10-CM

## 2021-03-18 PROCEDURE — 74177 CT ABD & PELVIS W/CONTRAST: CPT | Mod: 26,MH

## 2021-03-18 PROCEDURE — 74177 CT ABD & PELVIS W/CONTRAST: CPT

## 2021-03-18 PROCEDURE — 82565 ASSAY OF CREATININE: CPT

## 2021-03-18 PROCEDURE — 71260 CT THORAX DX C+: CPT

## 2021-03-18 PROCEDURE — 71260 CT THORAX DX C+: CPT | Mod: 26,MH

## 2021-03-18 PROCEDURE — G1004: CPT

## 2021-03-27 ENCOUNTER — OUTPATIENT (OUTPATIENT)
Dept: OUTPATIENT SERVICES | Facility: HOSPITAL | Age: 81
LOS: 1 days | Discharge: HOME | End: 2021-03-27
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
## 2021-03-27 DIAGNOSIS — Z41.9 ENCOUNTER FOR PROCEDURE FOR PURPOSES OTHER THAN REMEDYING HEALTH STATE, UNSPECIFIED: Chronic | ICD-10-CM

## 2021-03-27 DIAGNOSIS — Z90.49 ACQUIRED ABSENCE OF OTHER SPECIFIED PARTS OF DIGESTIVE TRACT: Chronic | ICD-10-CM

## 2021-03-27 DIAGNOSIS — S62.101A FRACTURE OF UNSPECIFIED CARPAL BONE, RIGHT WRIST, INITIAL ENCOUNTER FOR CLOSED FRACTURE: Chronic | ICD-10-CM

## 2021-03-27 DIAGNOSIS — M25.562 PAIN IN LEFT KNEE: ICD-10-CM

## 2021-03-27 DIAGNOSIS — C15.9 MALIGNANT NEOPLASM OF ESOPHAGUS, UNSPECIFIED: ICD-10-CM

## 2021-03-27 DIAGNOSIS — Z85.118 PERSONAL HISTORY OF OTHER MALIGNANT NEOPLASM OF BRONCHUS AND LUNG: Chronic | ICD-10-CM

## 2021-03-27 DIAGNOSIS — Z98.890 OTHER SPECIFIED POSTPROCEDURAL STATES: Chronic | ICD-10-CM

## 2021-03-27 DIAGNOSIS — Z90.89 ACQUIRED ABSENCE OF OTHER ORGANS: Chronic | ICD-10-CM

## 2021-03-27 DIAGNOSIS — Z98.1 ARTHRODESIS STATUS: Chronic | ICD-10-CM

## 2021-03-27 DIAGNOSIS — Z87.19 PERSONAL HISTORY OF OTHER DISEASES OF THE DIGESTIVE SYSTEM: Chronic | ICD-10-CM

## 2021-03-27 DIAGNOSIS — Z98.41 CATARACT EXTRACTION STATUS, RIGHT EYE: Chronic | ICD-10-CM

## 2021-03-27 DIAGNOSIS — S82.142D DISPLACED BICONDYLAR FRACTURE OF LEFT TIBIA, SUBSEQUENT ENCOUNTER FOR CLOSED FRACTURE WITH ROUTINE HEALING: ICD-10-CM

## 2021-03-31 ENCOUNTER — APPOINTMENT (OUTPATIENT)
Dept: HEMATOLOGY ONCOLOGY | Facility: CLINIC | Age: 81
End: 2021-03-31
Payer: MEDICARE

## 2021-03-31 VITALS
SYSTOLIC BLOOD PRESSURE: 112 MMHG | RESPIRATION RATE: 16 BRPM | TEMPERATURE: 98.1 F | HEART RATE: 64 BPM | BODY MASS INDEX: 24.96 KG/M2 | WEIGHT: 159 LBS | DIASTOLIC BLOOD PRESSURE: 64 MMHG | HEIGHT: 67 IN

## 2021-03-31 PROCEDURE — 99213 OFFICE O/P EST LOW 20 MIN: CPT

## 2021-03-31 NOTE — PHYSICAL EXAM
[Fully active, able to carry on all pre-disease performance without restriction] : Status 0 - Fully active, able to carry on all pre-disease performance without restriction [Normal] : affect appropriate [de-identified] : looks great

## 2021-03-31 NOTE — ASSESSMENT
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 \par S/p neoadjuvant chemoRT ( taxol/ carboplatin), s/p  R0 surgical resection on 11/11/15 for residual T2, N2 disease. \par S/p 2 additional cycles of systemic chemotherapy with Taxol and carboplatin.\par \par In 2017 developed low volume  multiple pulmonary metastases- s/p wedge resection.  \par \par S/p " second adjuvant"  Xeloda/ Oxaliplatin- completed in February 2018.\par \par Clinically doing well. Recent scans: YURY.\par \par Continue to monitor clinically.\par \par CT scans ( CAP with contrast) every 6 months-  will be due  in  September 2021.\par \par \par Physical exam after scans/ sooner PRN.\par \par Will have routine labs with yearly physical with Dr Lindsey in [place] .

## 2021-03-31 NOTE — HISTORY OF PRESENT ILLNESS
[Disease: _____________________] : Disease: [unfilled] [de-identified] : Presented in 2015 with chest/ epigastric pain.EGD on 7/24/15 showed malignant ulcerated mass in lower third of esophagus. Biopsy c/w infiltrating moderately differentiated adenocarcinoma. EUS :  T3 N2 nonobstructing distal esophageal cancer . PET/CT no evidence of distant mets. S/p preoperative chemoRT with Taxol/ Carboplatin. On 11/11/15 he underwent left  VATS esophagogastrectomy with abdominal lymphadenectomy at [facility].  \par SUrgical pathology showed  residual 2.5 mm single focus  of moderately differentiated  adenocarcinoma invading muscularis propria, three  of 27 LN with metastatic disease.  Received 2 additional cycles of systemic adjuvant chemotherapy with Taxol and carboplatin- completed 2/19/16.\par \par Surveillance scans showed tiny bilateral  pulmonary nodules. Right lung nodules were stable but left lung nodules- enlarges on follow up scans.\par On 8/25/17 he underwent wedge resection of two left lung pulmonary nodules ( Dr Luis Antonio Curtis [facility]) \par \par Pathology showed metastatic adenocarcinoma compatible with esophageal primary: 9 mm and 8 mm tumors with necrosis. 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  \par \par  \par CT CAP 3/18/21  : YURY [de-identified] : moderately differentiated adenocarcinoma [de-identified] : HER 2 negative [de-identified] : endoscopic staging T3, N2\par surgical staging after neoadjuvant treatment : T2, N2  ( 3/27) [de-identified] : Feels well. No new issues/ complaints. 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

## 2021-03-31 NOTE — REVIEW OF SYSTEMS
[Patient Intake Form Reviewed] : Patient intake form was reviewed [Negative] : Constitutional [de-identified] : minimal residual neuropathy

## 2021-04-02 DIAGNOSIS — C78.00 SECONDARY MALIGNANT NEOPLASM OF UNSPECIFIED LUNG: ICD-10-CM

## 2021-06-21 ENCOUNTER — APPOINTMENT (OUTPATIENT)
Age: 81
End: 2021-06-21
Payer: MEDICARE

## 2021-06-21 ENCOUNTER — NON-APPOINTMENT (OUTPATIENT)
Age: 81
End: 2021-06-21

## 2021-06-21 DIAGNOSIS — M17.12 UNILATERAL PRIMARY OSTEOARTHRITIS, LEFT KNEE: ICD-10-CM

## 2021-06-21 PROCEDURE — 73562 X-RAY EXAM OF KNEE 3: CPT | Mod: 26,LT

## 2021-06-21 PROCEDURE — 99213 OFFICE O/P EST LOW 20 MIN: CPT

## 2021-06-21 NOTE — HISTORY OF PRESENT ILLNESS
[de-identified] : Chang is a 80-year-old male who presents with acute on chronic left knee pain.  He has intermittent pain on a daily basis and states that the knee feels like it is going to give out when the pain occurs.  His pain scale in the office today is 1 out of 10 but at worst the pain increases to 8 out of 10.  He has no other complaints.

## 2021-06-21 NOTE — DISCUSSION/SUMMARY
[de-identified] : Assessment: Left knee pain/injury, internal derangement.\par \par Plan:\par #1. MRI of the left knee without contrast ordered to evaluate for meniscal tearing.  An MRI is warranted at this time as the patient has tried and failed conservative management. \par #2. Continue anti-inflammatories/Tylenol as needed for pain.\par #3. Over-the-counter knee brace as needed for support.\par #4. Continue ice and heat therapy. \par #5. Continue home exercise and stretching program.\par #6. Once the MRI is completed the patient will follow-up. All questions were answered. The patient understood the treatment course at this time.

## 2021-06-21 NOTE — PHYSICAL EXAM
[de-identified] : Left Knee Physical Examination:\par \par General: Alert and oriented x3.  In no acute distress.  Pleasant in nature with a normal affect.  No apparent respiratory distress. \par \par Erythema, Warmth, Rubor: Negative\par Swelling/Edema: No swelling present\par ROM: 0-120 degrees\par Jason's Test: Positive\par Medial Joint Line TTP: Positive\par Lateral Joint Line TTP: Negative\par Lachman Exam/Anterior Drawer/Pivot Shift Test: Negative \par MCL Pain: Negative\par LCL Pain: Negative\par Iliotibial Band Pain: Negative\par Patellofemoral Joint Pain: Negative\par Patellar Tendon TTP: Negative\par Pes Anserinus TTP: Negative\par Homans Sign: Negative\par Posterior Knee Pain: Negative\par Neuro: Intact with no sensory or motor deficits [de-identified] : X-rays of the left knee, 6/21/2021: Arthritic changes noted.

## 2021-06-22 ENCOUNTER — OUTPATIENT (OUTPATIENT)
Dept: OUTPATIENT SERVICES | Facility: HOSPITAL | Age: 81
LOS: 1 days | End: 2021-06-22
Payer: MEDICARE

## 2021-06-22 ENCOUNTER — APPOINTMENT (OUTPATIENT)
Dept: MRI IMAGING | Facility: CLINIC | Age: 81
End: 2021-06-22
Payer: MEDICARE

## 2021-06-22 DIAGNOSIS — Z85.118 PERSONAL HISTORY OF OTHER MALIGNANT NEOPLASM OF BRONCHUS AND LUNG: Chronic | ICD-10-CM

## 2021-06-22 DIAGNOSIS — Z41.9 ENCOUNTER FOR PROCEDURE FOR PURPOSES OTHER THAN REMEDYING HEALTH STATE, UNSPECIFIED: Chronic | ICD-10-CM

## 2021-06-22 DIAGNOSIS — Z98.890 OTHER SPECIFIED POSTPROCEDURAL STATES: Chronic | ICD-10-CM

## 2021-06-22 DIAGNOSIS — M23.92 UNSPECIFIED INTERNAL DERANGEMENT OF LEFT KNEE: ICD-10-CM

## 2021-06-22 DIAGNOSIS — Z87.19 PERSONAL HISTORY OF OTHER DISEASES OF THE DIGESTIVE SYSTEM: Chronic | ICD-10-CM

## 2021-06-22 DIAGNOSIS — Z98.41 CATARACT EXTRACTION STATUS, RIGHT EYE: Chronic | ICD-10-CM

## 2021-06-22 DIAGNOSIS — Z90.49 ACQUIRED ABSENCE OF OTHER SPECIFIED PARTS OF DIGESTIVE TRACT: Chronic | ICD-10-CM

## 2021-06-22 DIAGNOSIS — S62.101A FRACTURE OF UNSPECIFIED CARPAL BONE, RIGHT WRIST, INITIAL ENCOUNTER FOR CLOSED FRACTURE: Chronic | ICD-10-CM

## 2021-06-22 DIAGNOSIS — Z98.1 ARTHRODESIS STATUS: Chronic | ICD-10-CM

## 2021-06-22 DIAGNOSIS — Z90.89 ACQUIRED ABSENCE OF OTHER ORGANS: Chronic | ICD-10-CM

## 2021-06-22 PROCEDURE — 73721 MRI JNT OF LWR EXTRE W/O DYE: CPT | Mod: 26,LT,MH

## 2021-06-22 PROCEDURE — 73721 MRI JNT OF LWR EXTRE W/O DYE: CPT

## 2021-07-12 ENCOUNTER — OUTPATIENT (OUTPATIENT)
Dept: OUTPATIENT SERVICES | Facility: HOSPITAL | Age: 81
LOS: 1 days | End: 2021-07-12
Payer: MEDICARE

## 2021-07-12 ENCOUNTER — APPOINTMENT (OUTPATIENT)
Dept: ULTRASOUND IMAGING | Facility: CLINIC | Age: 81
End: 2021-07-12
Payer: MEDICARE

## 2021-07-12 DIAGNOSIS — Z98.890 OTHER SPECIFIED POSTPROCEDURAL STATES: Chronic | ICD-10-CM

## 2021-07-12 DIAGNOSIS — N40.0 BENIGN PROSTATIC HYPERPLASIA WITHOUT LOWER URINARY TRACT SYMPTOMS: ICD-10-CM

## 2021-07-12 DIAGNOSIS — S62.101A FRACTURE OF UNSPECIFIED CARPAL BONE, RIGHT WRIST, INITIAL ENCOUNTER FOR CLOSED FRACTURE: Chronic | ICD-10-CM

## 2021-07-12 DIAGNOSIS — Z98.1 ARTHRODESIS STATUS: Chronic | ICD-10-CM

## 2021-07-12 DIAGNOSIS — Z90.49 ACQUIRED ABSENCE OF OTHER SPECIFIED PARTS OF DIGESTIVE TRACT: Chronic | ICD-10-CM

## 2021-07-12 DIAGNOSIS — Z90.89 ACQUIRED ABSENCE OF OTHER ORGANS: Chronic | ICD-10-CM

## 2021-07-12 DIAGNOSIS — Z41.9 ENCOUNTER FOR PROCEDURE FOR PURPOSES OTHER THAN REMEDYING HEALTH STATE, UNSPECIFIED: Chronic | ICD-10-CM

## 2021-07-12 DIAGNOSIS — Z98.41 CATARACT EXTRACTION STATUS, RIGHT EYE: Chronic | ICD-10-CM

## 2021-07-12 DIAGNOSIS — Z87.19 PERSONAL HISTORY OF OTHER DISEASES OF THE DIGESTIVE SYSTEM: Chronic | ICD-10-CM

## 2021-07-12 DIAGNOSIS — Z85.118 PERSONAL HISTORY OF OTHER MALIGNANT NEOPLASM OF BRONCHUS AND LUNG: Chronic | ICD-10-CM

## 2021-07-12 PROCEDURE — 76770 US EXAM ABDO BACK WALL COMP: CPT | Mod: 26

## 2021-07-12 PROCEDURE — 76770 US EXAM ABDO BACK WALL COMP: CPT

## 2021-07-13 ENCOUNTER — APPOINTMENT (OUTPATIENT)
Dept: UROLOGY | Facility: CLINIC | Age: 81
End: 2021-07-13
Payer: MEDICARE

## 2021-07-13 DIAGNOSIS — R97.20 ELEVATED PROSTATE, SPECIFIC ANTIGEN [PSA]: ICD-10-CM

## 2021-07-13 PROCEDURE — 99213 OFFICE O/P EST LOW 20 MIN: CPT

## 2021-07-13 NOTE — HISTORY OF PRESENT ILLNESS
[FreeTextEntry1] : 80 year old man seen 01/12/2021 with complaint of gradually increasing PSA and weak stream. This began over the years. Patient reports his PSA was around 1.49 in 2/2014 and now 3.08 in 9/2020 and pt reports it was around 3 on 12/7/20 (trend can be seen in outside medical record). Patient also notes some weak stream over time and reports he is on Alfuzosin 10 mg QHS.  \par It is moderate in severity. Nothing makes the symptoms better, nothing makes sx worse. \par It is associated with nothing.\par No hematuria, no dysuria, no frequency, no urgency, No incontinence. \par No fevers, no chills, no nausea, no vomiting, no flank pain. \par \par No family history contributory to prostate cancer or BPH.\par \par 1/12/21 PVR: 31 mL. \par \par 07/13/2021: Patient presents for follow up. He reports nocturia x1/night continues, not bothersome. No other  complaints. He repeated PSA, which was 3.15 (5/2021).

## 2021-07-13 NOTE — ASSESSMENT
[FreeTextEntry1] : 79 yo male with hx of BPH and with recent increase in PSA. Stable and not considered elevated when corrected for his age. Do not recommend intervention. \par \par For LUTS, pt is not bothered and can observe.

## 2021-07-13 NOTE — REVIEW OF SYSTEMS
[Heartburn] : heartburn [Genital bacterial infection] : genital bacterial infection [Genital yeast infection] : genital yeast infection [Sexually Transmitted Disease (STD)] : sexually transmitted disease [Wake up at night to urinate  How many times?  ___] : wakes up to urinate [unfilled] times during the night [Wait a long time to urinate] : waits a long time to urinate [Slow urine stream] : slow urine stream [Interrupted urine stream] : interrupted urine stream [Negative] : Heme/Lymph

## 2021-09-09 ENCOUNTER — RESULT REVIEW (OUTPATIENT)
Age: 81
End: 2021-09-09

## 2021-09-29 ENCOUNTER — OUTPATIENT (OUTPATIENT)
Dept: OUTPATIENT SERVICES | Facility: HOSPITAL | Age: 81
LOS: 1 days | End: 2021-09-29
Payer: MEDICARE

## 2021-09-29 ENCOUNTER — APPOINTMENT (OUTPATIENT)
Dept: CT IMAGING | Facility: CLINIC | Age: 81
End: 2021-09-29
Payer: MEDICARE

## 2021-09-29 DIAGNOSIS — Z98.890 OTHER SPECIFIED POSTPROCEDURAL STATES: Chronic | ICD-10-CM

## 2021-09-29 DIAGNOSIS — Z87.19 PERSONAL HISTORY OF OTHER DISEASES OF THE DIGESTIVE SYSTEM: Chronic | ICD-10-CM

## 2021-09-29 DIAGNOSIS — Z85.118 PERSONAL HISTORY OF OTHER MALIGNANT NEOPLASM OF BRONCHUS AND LUNG: Chronic | ICD-10-CM

## 2021-09-29 DIAGNOSIS — Z98.41 CATARACT EXTRACTION STATUS, RIGHT EYE: Chronic | ICD-10-CM

## 2021-09-29 DIAGNOSIS — Z90.49 ACQUIRED ABSENCE OF OTHER SPECIFIED PARTS OF DIGESTIVE TRACT: Chronic | ICD-10-CM

## 2021-09-29 DIAGNOSIS — S62.101A FRACTURE OF UNSPECIFIED CARPAL BONE, RIGHT WRIST, INITIAL ENCOUNTER FOR CLOSED FRACTURE: Chronic | ICD-10-CM

## 2021-09-29 DIAGNOSIS — Z98.1 ARTHRODESIS STATUS: Chronic | ICD-10-CM

## 2021-09-29 DIAGNOSIS — Z90.89 ACQUIRED ABSENCE OF OTHER ORGANS: Chronic | ICD-10-CM

## 2021-09-29 DIAGNOSIS — C15.9 MALIGNANT NEOPLASM OF ESOPHAGUS, UNSPECIFIED: ICD-10-CM

## 2021-09-29 DIAGNOSIS — Z41.9 ENCOUNTER FOR PROCEDURE FOR PURPOSES OTHER THAN REMEDYING HEALTH STATE, UNSPECIFIED: Chronic | ICD-10-CM

## 2021-09-29 PROCEDURE — 74177 CT ABD & PELVIS W/CONTRAST: CPT

## 2021-09-29 PROCEDURE — 71260 CT THORAX DX C+: CPT

## 2021-09-29 PROCEDURE — 82565 ASSAY OF CREATININE: CPT

## 2021-09-29 PROCEDURE — 71260 CT THORAX DX C+: CPT | Mod: 26,MH

## 2021-09-29 PROCEDURE — 74177 CT ABD & PELVIS W/CONTRAST: CPT | Mod: 26,MH

## 2021-10-02 ENCOUNTER — OUTPATIENT (OUTPATIENT)
Dept: OUTPATIENT SERVICES | Facility: HOSPITAL | Age: 81
LOS: 1 days | Discharge: HOME | End: 2021-10-02

## 2021-10-02 DIAGNOSIS — C15.9 MALIGNANT NEOPLASM OF ESOPHAGUS, UNSPECIFIED: ICD-10-CM

## 2021-10-02 DIAGNOSIS — Z98.41 CATARACT EXTRACTION STATUS, RIGHT EYE: Chronic | ICD-10-CM

## 2021-10-02 DIAGNOSIS — Z85.118 PERSONAL HISTORY OF OTHER MALIGNANT NEOPLASM OF BRONCHUS AND LUNG: Chronic | ICD-10-CM

## 2021-10-02 DIAGNOSIS — Z98.1 ARTHRODESIS STATUS: Chronic | ICD-10-CM

## 2021-10-02 DIAGNOSIS — Z90.49 ACQUIRED ABSENCE OF OTHER SPECIFIED PARTS OF DIGESTIVE TRACT: Chronic | ICD-10-CM

## 2021-10-02 DIAGNOSIS — Z41.9 ENCOUNTER FOR PROCEDURE FOR PURPOSES OTHER THAN REMEDYING HEALTH STATE, UNSPECIFIED: Chronic | ICD-10-CM

## 2021-10-02 DIAGNOSIS — Z98.890 OTHER SPECIFIED POSTPROCEDURAL STATES: Chronic | ICD-10-CM

## 2021-10-02 DIAGNOSIS — Z90.89 ACQUIRED ABSENCE OF OTHER ORGANS: Chronic | ICD-10-CM

## 2021-10-02 DIAGNOSIS — S62.101A FRACTURE OF UNSPECIFIED CARPAL BONE, RIGHT WRIST, INITIAL ENCOUNTER FOR CLOSED FRACTURE: Chronic | ICD-10-CM

## 2021-10-02 DIAGNOSIS — Z87.19 PERSONAL HISTORY OF OTHER DISEASES OF THE DIGESTIVE SYSTEM: Chronic | ICD-10-CM

## 2021-10-03 RX ORDER — ALFUZOSIN HYDROCHLORIDE 10 MG/1
10 TABLET, EXTENDED RELEASE ORAL
Refills: 0 | Status: DISCONTINUED | COMMUNITY
End: 2021-10-03

## 2021-10-05 ENCOUNTER — RESULT REVIEW (OUTPATIENT)
Age: 81
End: 2021-10-05

## 2021-10-05 ENCOUNTER — APPOINTMENT (OUTPATIENT)
Dept: HEMATOLOGY ONCOLOGY | Facility: CLINIC | Age: 81
End: 2021-10-05
Payer: MEDICARE

## 2021-10-05 VITALS
HEART RATE: 65 BPM | SYSTOLIC BLOOD PRESSURE: 119 MMHG | DIASTOLIC BLOOD PRESSURE: 74 MMHG | HEIGHT: 67 IN | TEMPERATURE: 98 F | BODY MASS INDEX: 23.39 KG/M2 | WEIGHT: 149 LBS

## 2021-10-05 LAB
ALBUMIN SERPL ELPH-MCNC: 4.4 G/DL — SIGNIFICANT CHANGE UP (ref 3.3–5)
ALP SERPL-CCNC: 74 U/L — SIGNIFICANT CHANGE UP (ref 40–120)
ALT FLD-CCNC: 18 U/L — SIGNIFICANT CHANGE UP (ref 10–45)
ANION GAP SERPL CALC-SCNC: 12 MMOL/L — SIGNIFICANT CHANGE UP (ref 5–17)
AST SERPL-CCNC: 27 U/L — SIGNIFICANT CHANGE UP (ref 10–40)
BASOPHILS # BLD AUTO: 0.04 K/UL — SIGNIFICANT CHANGE UP (ref 0–0.2)
BASOPHILS NFR BLD AUTO: 0.8 % — SIGNIFICANT CHANGE UP (ref 0–2)
BILIRUB SERPL-MCNC: 0.2 MG/DL — SIGNIFICANT CHANGE UP (ref 0.2–1.2)
BUN SERPL-MCNC: 26 MG/DL — HIGH (ref 7–23)
CALCIUM SERPL-MCNC: 10.1 MG/DL — SIGNIFICANT CHANGE UP (ref 8.4–10.5)
CHLORIDE SERPL-SCNC: 104 MMOL/L — SIGNIFICANT CHANGE UP (ref 96–108)
CO2 SERPL-SCNC: 26 MMOL/L — SIGNIFICANT CHANGE UP (ref 22–31)
CREAT SERPL-MCNC: 1.35 MG/DL — HIGH (ref 0.5–1.3)
EOSINOPHIL # BLD AUTO: 0.09 K/UL — SIGNIFICANT CHANGE UP (ref 0–0.5)
EOSINOPHIL NFR BLD AUTO: 1.7 % — SIGNIFICANT CHANGE UP (ref 0–6)
GLUCOSE SERPL-MCNC: 125 MG/DL — HIGH (ref 70–99)
HCT VFR BLD CALC: 38.4 % — LOW (ref 39–50)
HGB BLD-MCNC: 13.2 G/DL — SIGNIFICANT CHANGE UP (ref 13–17)
IMM GRANULOCYTES NFR BLD AUTO: 0.2 % — SIGNIFICANT CHANGE UP (ref 0–1.5)
LYMPHOCYTES # BLD AUTO: 0.73 K/UL — LOW (ref 1–3.3)
LYMPHOCYTES # BLD AUTO: 13.7 % — SIGNIFICANT CHANGE UP (ref 13–44)
MCHC RBC-ENTMCNC: 33.1 PG — SIGNIFICANT CHANGE UP (ref 27–34)
MCHC RBC-ENTMCNC: 34.4 GM/DL — SIGNIFICANT CHANGE UP (ref 32–36)
MCV RBC AUTO: 96.2 FL — SIGNIFICANT CHANGE UP (ref 80–100)
MONOCYTES # BLD AUTO: 0.53 K/UL — SIGNIFICANT CHANGE UP (ref 0–0.9)
MONOCYTES NFR BLD AUTO: 10 % — SIGNIFICANT CHANGE UP (ref 2–14)
NEUTROPHILS # BLD AUTO: 3.91 K/UL — SIGNIFICANT CHANGE UP (ref 1.8–7.4)
NEUTROPHILS NFR BLD AUTO: 73.6 % — SIGNIFICANT CHANGE UP (ref 43–77)
NRBC # BLD: 0 /100 WBCS — SIGNIFICANT CHANGE UP (ref 0–0)
PLATELET # BLD AUTO: 191 K/UL — SIGNIFICANT CHANGE UP (ref 150–400)
POTASSIUM SERPL-MCNC: 5.3 MMOL/L — SIGNIFICANT CHANGE UP (ref 3.5–5.3)
POTASSIUM SERPL-SCNC: 5.3 MMOL/L — SIGNIFICANT CHANGE UP (ref 3.5–5.3)
PROT SERPL-MCNC: 7 G/DL — SIGNIFICANT CHANGE UP (ref 6–8.3)
RBC # BLD: 3.99 M/UL — LOW (ref 4.2–5.8)
RBC # FLD: 12.7 % — SIGNIFICANT CHANGE UP (ref 10.3–14.5)
SODIUM SERPL-SCNC: 142 MMOL/L — SIGNIFICANT CHANGE UP (ref 135–145)
WBC # BLD: 5.31 K/UL — SIGNIFICANT CHANGE UP (ref 3.8–10.5)
WBC # FLD AUTO: 5.31 K/UL — SIGNIFICANT CHANGE UP (ref 3.8–10.5)

## 2021-10-05 PROCEDURE — 99214 OFFICE O/P EST MOD 30 MIN: CPT

## 2021-10-05 NOTE — HISTORY OF PRESENT ILLNESS
[Disease: _____________________] : Disease: [unfilled] [de-identified] : Presented in 2015 with chest/ epigastric pain.EGD on 7/24/15 showed malignant ulcerated mass in lower third of esophagus. Biopsy c/w infiltrating moderately differentiated adenocarcinoma. EUS :  T3 N2 nonobstructing distal esophageal cancer . PET/CT no evidence of distant mets. S/p preoperative chemoRT with Taxol/ Carboplatin. On 11/11/15 he underwent left  VATS esophagogastrectomy with abdominal lymphadenectomy at [facility].  \par SUrgical pathology showed  residual 2.5 mm single focus  of moderately differentiated  adenocarcinoma invading muscularis propria, three  of 27 LN with metastatic disease.  Received 2 additional cycles of systemic adjuvant chemotherapy with Taxol and carboplatin- completed 2/19/16.\par \par Surveillance scans showed tiny bilateral  pulmonary nodules. Right lung nodules were stable but left lung nodules- enlarges on follow up scans.\par On 8/25/17 he underwent wedge resection of two left lung pulmonary nodules ( Dr Luis Antonio Curtis [facility]) \par \par Pathology showed metastatic adenocarcinoma compatible with esophageal primary: 9 mm and 8 mm tumors with necrosis. 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  \par \par  \par CT CAP 9/29/21  : YURY [de-identified] : moderately differentiated adenocarcinoma [de-identified] : HER 2 negative [de-identified] : endoscopic staging T3, N2\par surgical staging after neoadjuvant treatment : T2, N2  ( 3/27) [de-identified] : Feels well. No new issues/ complaints. 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 He  lost weight ( intentional ) - now trying to maintain it. No GI c/o .

## 2021-10-05 NOTE — ASSESSMENT
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 \par S/p neoadjuvant chemoRT ( taxol/ carboplatin), s/p  R0 surgical resection on 11/11/15 for residual T2, N2 disease. \par S/p 2 additional cycles of systemic chemotherapy with Taxol and carboplatin.\par \par In 2017 developed low volume  multiple pulmonary metastases- s/p wedge resection.  \par \par S/p " second adjuvant"  Xeloda/ Oxaliplatin- completed in February 2018.\par \par Clinically doing well. Recent scans ( 9/2021) : YURY.\par \par Continue to monitor clinically.\par \par CT scans ( CAP with contrast) in 12 months-  sooner PRN,\par \par He will see his PCP Dr Lindsey  for full physical and blood work in April 2022.\par \par Return visit here n one year ( after scans)/ sooner PRN.\par \par Follow up with GI ( Dr David)- overdue for surveillance EGD.

## 2021-12-06 ENCOUNTER — APPOINTMENT (OUTPATIENT)
Dept: GASTROENTEROLOGY | Facility: CLINIC | Age: 81
End: 2021-12-06
Payer: MEDICARE

## 2021-12-06 VITALS
DIASTOLIC BLOOD PRESSURE: 74 MMHG | BODY MASS INDEX: 23.95 KG/M2 | WEIGHT: 149 LBS | HEART RATE: 66 BPM | SYSTOLIC BLOOD PRESSURE: 120 MMHG | HEIGHT: 66 IN

## 2021-12-06 DIAGNOSIS — Z79.899 OTHER LONG TERM (CURRENT) DRUG THERAPY: ICD-10-CM

## 2021-12-06 DIAGNOSIS — K58.9 IRRITABLE BOWEL SYNDROME W/OUT DIARRHEA: ICD-10-CM

## 2021-12-06 PROCEDURE — 99214 OFFICE O/P EST MOD 30 MIN: CPT

## 2021-12-06 NOTE — HISTORY OF PRESENT ILLNESS
[de-identified] : 82yo male with hx esophageal cancer\par \par He has hx esophageal cancer and doing fairly well overall\par He is due for surveillance endoscopy\par \par He takes dicyclomine several times per week for IBS

## 2021-12-06 NOTE — ASSESSMENT
[FreeTextEntry1] : 82yo male with hx esophageal cancer, IBS\par \par Dicyclomine for IBS prn\par Continue Omeprazole for GERD\par \par Will check egd given hx of esophageal cancer and due for surveillance

## 2022-01-28 ENCOUNTER — RESULT REVIEW (OUTPATIENT)
Age: 82
End: 2022-01-28

## 2022-01-28 ENCOUNTER — APPOINTMENT (OUTPATIENT)
Dept: GASTROENTEROLOGY | Facility: AMBULATORY MEDICAL SERVICES | Age: 82
End: 2022-01-28
Payer: MEDICARE

## 2022-01-28 PROCEDURE — 43239 EGD BIOPSY SINGLE/MULTIPLE: CPT

## 2022-03-28 ENCOUNTER — RX RENEWAL (OUTPATIENT)
Age: 82
End: 2022-03-28

## 2022-04-15 ENCOUNTER — INPATIENT (INPATIENT)
Facility: HOSPITAL | Age: 82
LOS: 5 days | Discharge: HOME | DRG: 25 | End: 2022-04-21
Attending: SPECIALIST | Admitting: SPECIALIST
Payer: MEDICARE

## 2022-04-15 VITALS
DIASTOLIC BLOOD PRESSURE: 83 MMHG | SYSTOLIC BLOOD PRESSURE: 152 MMHG | OXYGEN SATURATION: 100 % | HEIGHT: 67 IN | WEIGHT: 149.91 LBS | TEMPERATURE: 98 F | HEART RATE: 69 BPM | RESPIRATION RATE: 17 BRPM

## 2022-04-15 DIAGNOSIS — Z86.73 PERSONAL HISTORY OF TRANSIENT ISCHEMIC ATTACK (TIA), AND CEREBRAL INFARCTION WITHOUT RESIDUAL DEFICITS: ICD-10-CM

## 2022-04-15 DIAGNOSIS — H53.462 HOMONYMOUS BILATERAL FIELD DEFECTS, LEFT SIDE: ICD-10-CM

## 2022-04-15 DIAGNOSIS — N40.0 BENIGN PROSTATIC HYPERPLASIA WITHOUT LOWER URINARY TRACT SYMPTOMS: ICD-10-CM

## 2022-04-15 DIAGNOSIS — R73.03 PREDIABETES: ICD-10-CM

## 2022-04-15 DIAGNOSIS — Z87.891 PERSONAL HISTORY OF NICOTINE DEPENDENCE: ICD-10-CM

## 2022-04-15 DIAGNOSIS — E78.5 HYPERLIPIDEMIA, UNSPECIFIED: ICD-10-CM

## 2022-04-15 DIAGNOSIS — Z98.1 ARTHRODESIS STATUS: ICD-10-CM

## 2022-04-15 DIAGNOSIS — Z79.01 LONG TERM (CURRENT) USE OF ANTICOAGULANTS: ICD-10-CM

## 2022-04-15 DIAGNOSIS — Z90.49 ACQUIRED ABSENCE OF OTHER SPECIFIED PARTS OF DIGESTIVE TRACT: Chronic | ICD-10-CM

## 2022-04-15 DIAGNOSIS — Z98.1 ARTHRODESIS STATUS: Chronic | ICD-10-CM

## 2022-04-15 DIAGNOSIS — Z98.41 CATARACT EXTRACTION STATUS, RIGHT EYE: ICD-10-CM

## 2022-04-15 DIAGNOSIS — Z98.890 OTHER SPECIFIED POSTPROCEDURAL STATES: Chronic | ICD-10-CM

## 2022-04-15 DIAGNOSIS — C79.31 SECONDARY MALIGNANT NEOPLASM OF BRAIN: ICD-10-CM

## 2022-04-15 DIAGNOSIS — Z41.9 ENCOUNTER FOR PROCEDURE FOR PURPOSES OTHER THAN REMEDYING HEALTH STATE, UNSPECIFIED: Chronic | ICD-10-CM

## 2022-04-15 DIAGNOSIS — G93.89 OTHER SPECIFIED DISORDERS OF BRAIN: ICD-10-CM

## 2022-04-15 DIAGNOSIS — Z87.19 PERSONAL HISTORY OF OTHER DISEASES OF THE DIGESTIVE SYSTEM: Chronic | ICD-10-CM

## 2022-04-15 DIAGNOSIS — I48.0 PAROXYSMAL ATRIAL FIBRILLATION: ICD-10-CM

## 2022-04-15 DIAGNOSIS — Z90.49 ACQUIRED ABSENCE OF OTHER SPECIFIED PARTS OF DIGESTIVE TRACT: ICD-10-CM

## 2022-04-15 DIAGNOSIS — S62.101A FRACTURE OF UNSPECIFIED CARPAL BONE, RIGHT WRIST, INITIAL ENCOUNTER FOR CLOSED FRACTURE: Chronic | ICD-10-CM

## 2022-04-15 DIAGNOSIS — N17.9 ACUTE KIDNEY FAILURE, UNSPECIFIED: ICD-10-CM

## 2022-04-15 DIAGNOSIS — Z92.3 PERSONAL HISTORY OF IRRADIATION: ICD-10-CM

## 2022-04-15 DIAGNOSIS — Z85.118 PERSONAL HISTORY OF OTHER MALIGNANT NEOPLASM OF BRONCHUS AND LUNG: Chronic | ICD-10-CM

## 2022-04-15 DIAGNOSIS — Z98.41 CATARACT EXTRACTION STATUS, RIGHT EYE: Chronic | ICD-10-CM

## 2022-04-15 DIAGNOSIS — C78.02 SECONDARY MALIGNANT NEOPLASM OF LEFT LUNG: ICD-10-CM

## 2022-04-15 DIAGNOSIS — Z92.21 PERSONAL HISTORY OF ANTINEOPLASTIC CHEMOTHERAPY: ICD-10-CM

## 2022-04-15 DIAGNOSIS — C15.9 MALIGNANT NEOPLASM OF ESOPHAGUS, UNSPECIFIED: ICD-10-CM

## 2022-04-15 DIAGNOSIS — G93.6 CEREBRAL EDEMA: ICD-10-CM

## 2022-04-15 DIAGNOSIS — I10 ESSENTIAL (PRIMARY) HYPERTENSION: ICD-10-CM

## 2022-04-15 DIAGNOSIS — G93.5 COMPRESSION OF BRAIN: ICD-10-CM

## 2022-04-15 DIAGNOSIS — K57.90 DIVERTICULOSIS OF INTESTINE, PART UNSPECIFIED, WITHOUT PERFORATION OR ABSCESS WITHOUT BLEEDING: ICD-10-CM

## 2022-04-15 DIAGNOSIS — K21.9 GASTRO-ESOPHAGEAL REFLUX DISEASE WITHOUT ESOPHAGITIS: ICD-10-CM

## 2022-04-15 DIAGNOSIS — Z79.82 LONG TERM (CURRENT) USE OF ASPIRIN: ICD-10-CM

## 2022-04-15 DIAGNOSIS — Z90.89 ACQUIRED ABSENCE OF OTHER ORGANS: Chronic | ICD-10-CM

## 2022-04-15 DIAGNOSIS — Z98.42 CATARACT EXTRACTION STATUS, LEFT EYE: ICD-10-CM

## 2022-04-15 DIAGNOSIS — Z90.2 ACQUIRED ABSENCE OF LUNG [PART OF]: ICD-10-CM

## 2022-04-15 LAB
ALBUMIN SERPL ELPH-MCNC: 3.7 G/DL — SIGNIFICANT CHANGE UP (ref 3.3–5)
ALP SERPL-CCNC: 68 U/L — SIGNIFICANT CHANGE UP (ref 40–120)
ALT FLD-CCNC: 27 U/L — SIGNIFICANT CHANGE UP (ref 12–78)
ANION GAP SERPL CALC-SCNC: 5 MMOL/L — SIGNIFICANT CHANGE UP (ref 5–17)
APPEARANCE UR: CLEAR — SIGNIFICANT CHANGE UP
APTT BLD: 32.8 SEC — SIGNIFICANT CHANGE UP (ref 27.5–35.5)
AST SERPL-CCNC: 26 U/L — SIGNIFICANT CHANGE UP (ref 15–37)
BASOPHILS # BLD AUTO: 0.06 K/UL — SIGNIFICANT CHANGE UP (ref 0–0.2)
BASOPHILS NFR BLD AUTO: 0.6 % — SIGNIFICANT CHANGE UP (ref 0–2)
BILIRUB SERPL-MCNC: 0.3 MG/DL — SIGNIFICANT CHANGE UP (ref 0.2–1.2)
BILIRUB UR-MCNC: NEGATIVE — SIGNIFICANT CHANGE UP
BUN SERPL-MCNC: 25 MG/DL — HIGH (ref 7–23)
CALCIUM SERPL-MCNC: 9.6 MG/DL — SIGNIFICANT CHANGE UP (ref 8.5–10.1)
CHLORIDE SERPL-SCNC: 106 MMOL/L — SIGNIFICANT CHANGE UP (ref 96–108)
CO2 SERPL-SCNC: 27 MMOL/L — SIGNIFICANT CHANGE UP (ref 22–31)
COLOR SPEC: YELLOW — SIGNIFICANT CHANGE UP
CREAT SERPL-MCNC: 1.29 MG/DL — SIGNIFICANT CHANGE UP (ref 0.5–1.3)
DIFF PNL FLD: NEGATIVE — SIGNIFICANT CHANGE UP
EGFR: 56 ML/MIN/1.73M2 — LOW
EOSINOPHIL # BLD AUTO: 0.11 K/UL — SIGNIFICANT CHANGE UP (ref 0–0.5)
EOSINOPHIL NFR BLD AUTO: 1.2 % — SIGNIFICANT CHANGE UP (ref 0–6)
ERYTHROCYTE [SEDIMENTATION RATE] IN BLOOD: 14 MM/HR — SIGNIFICANT CHANGE UP (ref 0–20)
GLUCOSE SERPL-MCNC: 111 MG/DL — HIGH (ref 70–99)
GLUCOSE UR QL: NEGATIVE — SIGNIFICANT CHANGE UP
HCT VFR BLD CALC: 38.5 % — LOW (ref 39–50)
HGB BLD-MCNC: 13.2 G/DL — SIGNIFICANT CHANGE UP (ref 13–17)
IMM GRANULOCYTES NFR BLD AUTO: 0.2 % — SIGNIFICANT CHANGE UP (ref 0–1.5)
INR BLD: 1.03 RATIO — SIGNIFICANT CHANGE UP (ref 0.88–1.16)
KETONES UR-MCNC: NEGATIVE — SIGNIFICANT CHANGE UP
LEUKOCYTE ESTERASE UR-ACNC: NEGATIVE — SIGNIFICANT CHANGE UP
LYMPHOCYTES # BLD AUTO: 0.88 K/UL — LOW (ref 1–3.3)
LYMPHOCYTES # BLD AUTO: 9.5 % — LOW (ref 13–44)
MCHC RBC-ENTMCNC: 33.3 PG — SIGNIFICANT CHANGE UP (ref 27–34)
MCHC RBC-ENTMCNC: 34.3 GM/DL — SIGNIFICANT CHANGE UP (ref 32–36)
MCV RBC AUTO: 97.2 FL — SIGNIFICANT CHANGE UP (ref 80–100)
MONOCYTES # BLD AUTO: 0.68 K/UL — SIGNIFICANT CHANGE UP (ref 0–0.9)
MONOCYTES NFR BLD AUTO: 7.4 % — SIGNIFICANT CHANGE UP (ref 2–14)
NEUTROPHILS # BLD AUTO: 7.5 K/UL — HIGH (ref 1.8–7.4)
NEUTROPHILS NFR BLD AUTO: 81.1 % — HIGH (ref 43–77)
NITRITE UR-MCNC: NEGATIVE — SIGNIFICANT CHANGE UP
PH UR: 7 — SIGNIFICANT CHANGE UP (ref 5–8)
PLATELET # BLD AUTO: 214 K/UL — SIGNIFICANT CHANGE UP (ref 150–400)
POTASSIUM SERPL-MCNC: 4.1 MMOL/L — SIGNIFICANT CHANGE UP (ref 3.5–5.3)
POTASSIUM SERPL-SCNC: 4.1 MMOL/L — SIGNIFICANT CHANGE UP (ref 3.5–5.3)
PROT SERPL-MCNC: 7.5 GM/DL — SIGNIFICANT CHANGE UP (ref 6–8.3)
PROT UR-MCNC: NEGATIVE — SIGNIFICANT CHANGE UP
PROTHROM AB SERPL-ACNC: 11.9 SEC — SIGNIFICANT CHANGE UP (ref 10.5–13.4)
RBC # BLD: 3.96 M/UL — LOW (ref 4.2–5.8)
RBC # FLD: 12.5 % — SIGNIFICANT CHANGE UP (ref 10.3–14.5)
SODIUM SERPL-SCNC: 138 MMOL/L — SIGNIFICANT CHANGE UP (ref 135–145)
SP GR SPEC: 1 — LOW (ref 1.01–1.02)
TROPONIN I, HIGH SENSITIVITY RESULT: 6.73 NG/L — SIGNIFICANT CHANGE UP
UROBILINOGEN FLD QL: NEGATIVE — SIGNIFICANT CHANGE UP
WBC # BLD: 9.25 K/UL — SIGNIFICANT CHANGE UP (ref 3.8–10.5)
WBC # FLD AUTO: 9.25 K/UL — SIGNIFICANT CHANGE UP (ref 3.8–10.5)

## 2022-04-15 PROCEDURE — 70553 MRI BRAIN STEM W/O & W/DYE: CPT

## 2022-04-15 PROCEDURE — 70551 MRI BRAIN STEM W/O DYE: CPT

## 2022-04-15 PROCEDURE — 82607 VITAMIN B-12: CPT

## 2022-04-15 PROCEDURE — U0005: CPT

## 2022-04-15 PROCEDURE — 99223 1ST HOSP IP/OBS HIGH 75: CPT

## 2022-04-15 PROCEDURE — 85027 COMPLETE CBC AUTOMATED: CPT

## 2022-04-15 PROCEDURE — 71045 X-RAY EXAM CHEST 1 VIEW: CPT | Mod: 26

## 2022-04-15 PROCEDURE — 70553 MRI BRAIN STEM W/O & W/DYE: CPT | Mod: 26

## 2022-04-15 PROCEDURE — 88331 PATH CONSLTJ SURG 1 BLK 1SPC: CPT

## 2022-04-15 PROCEDURE — A9579: CPT

## 2022-04-15 PROCEDURE — 88341 IMHCHEM/IMCYTCHM EA ADD ANTB: CPT

## 2022-04-15 PROCEDURE — C1713: CPT

## 2022-04-15 PROCEDURE — 84100 ASSAY OF PHOSPHORUS: CPT

## 2022-04-15 PROCEDURE — 74177 CT ABD & PELVIS W/CONTRAST: CPT | Mod: 26

## 2022-04-15 PROCEDURE — 71260 CT THORAX DX C+: CPT | Mod: 26

## 2022-04-15 PROCEDURE — 82746 ASSAY OF FOLIC ACID SERUM: CPT

## 2022-04-15 PROCEDURE — C1889: CPT

## 2022-04-15 PROCEDURE — 83036 HEMOGLOBIN GLYCOSYLATED A1C: CPT

## 2022-04-15 PROCEDURE — 85610 PROTHROMBIN TIME: CPT

## 2022-04-15 PROCEDURE — 99285 EMERGENCY DEPT VISIT HI MDM: CPT

## 2022-04-15 PROCEDURE — 84443 ASSAY THYROID STIM HORMONE: CPT

## 2022-04-15 PROCEDURE — 80053 COMPREHEN METABOLIC PANEL: CPT

## 2022-04-15 PROCEDURE — 70450 CT HEAD/BRAIN W/O DYE: CPT | Mod: 26,MA

## 2022-04-15 PROCEDURE — 86850 RBC ANTIBODY SCREEN: CPT

## 2022-04-15 PROCEDURE — 86900 BLOOD TYPING SEROLOGIC ABO: CPT

## 2022-04-15 PROCEDURE — 85025 COMPLETE CBC W/AUTO DIFF WBC: CPT

## 2022-04-15 PROCEDURE — G1004: CPT

## 2022-04-15 PROCEDURE — 71260 CT THORAX DX C+: CPT

## 2022-04-15 PROCEDURE — 86901 BLOOD TYPING SEROLOGIC RH(D): CPT

## 2022-04-15 PROCEDURE — 88307 TISSUE EXAM BY PATHOLOGIST: CPT

## 2022-04-15 PROCEDURE — 93010 ELECTROCARDIOGRAM REPORT: CPT

## 2022-04-15 PROCEDURE — 81003 URINALYSIS AUTO W/O SCOPE: CPT

## 2022-04-15 PROCEDURE — 88342 IMHCHEM/IMCYTCHM 1ST ANTB: CPT

## 2022-04-15 PROCEDURE — 88360 TUMOR IMMUNOHISTOCHEM/MANUAL: CPT

## 2022-04-15 PROCEDURE — C9399: CPT

## 2022-04-15 PROCEDURE — 74177 CT ABD & PELVIS W/CONTRAST: CPT

## 2022-04-15 PROCEDURE — 36415 COLL VENOUS BLD VENIPUNCTURE: CPT

## 2022-04-15 PROCEDURE — 85730 THROMBOPLASTIN TIME PARTIAL: CPT

## 2022-04-15 PROCEDURE — 97116 GAIT TRAINING THERAPY: CPT | Mod: GP

## 2022-04-15 PROCEDURE — 82306 VITAMIN D 25 HYDROXY: CPT

## 2022-04-15 PROCEDURE — 70450 CT HEAD/BRAIN W/O DYE: CPT

## 2022-04-15 PROCEDURE — 97162 PT EVAL MOD COMPLEX 30 MIN: CPT | Mod: GP

## 2022-04-15 PROCEDURE — U0003: CPT

## 2022-04-15 PROCEDURE — C1763: CPT

## 2022-04-15 PROCEDURE — 83735 ASSAY OF MAGNESIUM: CPT

## 2022-04-15 PROCEDURE — 71045 X-RAY EXAM CHEST 1 VIEW: CPT

## 2022-04-15 RX ORDER — ACETAMINOPHEN 500 MG
650 TABLET ORAL EVERY 6 HOURS
Refills: 0 | Status: DISCONTINUED | OUTPATIENT
Start: 2022-04-15 | End: 2022-04-19

## 2022-04-15 RX ORDER — SODIUM CHLORIDE 9 MG/ML
3 INJECTION INTRAMUSCULAR; INTRAVENOUS; SUBCUTANEOUS ONCE
Refills: 0 | Status: COMPLETED | OUTPATIENT
Start: 2022-04-15 | End: 2022-04-15

## 2022-04-15 RX ORDER — SENNA PLUS 8.6 MG/1
2 TABLET ORAL AT BEDTIME
Refills: 0 | Status: DISCONTINUED | OUTPATIENT
Start: 2022-04-15 | End: 2022-04-19

## 2022-04-15 RX ORDER — HYDROCHLOROTHIAZIDE 25 MG
25 TABLET ORAL DAILY
Refills: 0 | Status: DISCONTINUED | OUTPATIENT
Start: 2022-04-15 | End: 2022-04-18

## 2022-04-15 RX ORDER — PANTOPRAZOLE SODIUM 20 MG/1
40 TABLET, DELAYED RELEASE ORAL
Refills: 0 | Status: DISCONTINUED | OUTPATIENT
Start: 2022-04-15 | End: 2022-04-19

## 2022-04-15 RX ORDER — TAMSULOSIN HYDROCHLORIDE 0.4 MG/1
0.8 CAPSULE ORAL AT BEDTIME
Refills: 0 | Status: DISCONTINUED | OUTPATIENT
Start: 2022-04-15 | End: 2022-04-19

## 2022-04-15 RX ORDER — ONDANSETRON 8 MG/1
4 TABLET, FILM COATED ORAL EVERY 6 HOURS
Refills: 0 | Status: DISCONTINUED | OUTPATIENT
Start: 2022-04-15 | End: 2022-04-19

## 2022-04-15 RX ORDER — SIMVASTATIN 20 MG/1
20 TABLET, FILM COATED ORAL AT BEDTIME
Refills: 0 | Status: DISCONTINUED | OUTPATIENT
Start: 2022-04-15 | End: 2022-04-19

## 2022-04-15 RX ORDER — METOCLOPRAMIDE HCL 10 MG
10 TABLET ORAL EVERY 6 HOURS
Refills: 0 | Status: DISCONTINUED | OUTPATIENT
Start: 2022-04-15 | End: 2022-04-19

## 2022-04-15 RX ADMIN — SODIUM CHLORIDE 3 MILLILITER(S): 9 INJECTION INTRAMUSCULAR; INTRAVENOUS; SUBCUTANEOUS at 14:30

## 2022-04-15 RX ADMIN — Medication 650 MILLIGRAM(S): at 22:30

## 2022-04-15 RX ADMIN — Medication 650 MILLIGRAM(S): at 21:30

## 2022-04-15 NOTE — H&P ADULT - ASSESSMENT
82 yo Right handed male PMH paroxysmal atrial flutter on Eliquis / ASA, esophageal carcinoma with mets to the lung s/p sx / chemo / RT, HTN, HLD, diverticulosis, GERD, BPH, presented to ED with c/o visual loss in left eye x few weeks. CT head in ER sig for Right occipital mass with brain compression and vasogenic edema.    - No acute neurosurgical intervention   - Admit to Dr Amado, [address] [place]  - Neuros / Vitals q 8 hours  - Hold Eliquis / ASA for now  - MRI +/- brain  - No decadron until after MRI (? remote possibility of CNS lymphoma)  - CT C/A/P for met w/u  - Hospitalist eval for medical clearance for possible OR  - Venodynes    Discussed with Dr Amado

## 2022-04-15 NOTE — H&P ADULT - NSICDXPASTMEDICALHX_GEN_ALL_CORE_FT
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  s/p chemo finished  2/2018    GERD (gastroesophageal reflux disease)     HLD (hyperlipidemia)     HTN (hypertension)     Lung cancer s/p chemo 2018

## 2022-04-15 NOTE — H&P ADULT - NSHPLABSRESULTS_GEN_ALL_CORE
13.2   9.25  )-----------( 214      ( 15 Apr 2022 14:28 )             38.5     138    |  106    |  25     ----------------------------<  111    4.1     |  27     |  1.29     Ca    9.6        15 Apr 2022 14:28    TPro  7.5    /  Alb  3.7    /  TBili  0.3    /  DBili  x      /  AST  26     /  ALT  27     /  AlkPhos  68     15 Apr 2022 14:28    LIVER FUNCTIONS - ( 15 Apr 2022 14:28 )  Alb: 3.7 g/dL / Pro: 7.5 gm/dL / ALK PHOS: 68 U/L / ALT: 27 U/L / AST: 26 U/L / GGT: x           PT/INR - ( 15 Apr 2022 14:28 )   PT: 11.9 sec;   INR: 1.03 ratio      PTT - ( 15 Apr 2022 14:28 )  PTT:32.8 sec      CT Head No Cont (04.15.22 @ 15:26)  Interval right parietal 3.4 x 3.5 x 4.3 cm, AP, TR, CC heterogeneous mass   with edema, cystic foci, involving  right occipital, posterior frontal,    temporal lobes and right lateral ventricle occipital horn concerning for   neoplasm, primary not excluded, more likely metastatic with additional   heterogeneous foci partially visualized, suggest contrast. Volume loss,   microvascular disease, effaced right ambient cistern, 2 mm leftward   shift.

## 2022-04-15 NOTE — ED PROVIDER NOTE - CLINICAL SUMMARY MEDICAL DECISION MAKING FREE TEXT BOX
Probable vision. CT scan, labs, pt should get dose of Aspirin and needs stroke workup and neuro eval.

## 2022-04-15 NOTE — ED PROVIDER NOTE - OBJECTIVE STATEMENT
81 M hx paroxysmal atrial flutter, HLD, diverticulosis, lung cancer, HTN, GERD, BPH, esophageal carcinoma here c/o eye vision change in left eye x few weeks. pt reports peripheral vision of left eye is darkened; notes that over past few weeks he has been accidentally bumping left shoulder again door frame when walking through it.  pt saw OD Dr. Jose Noland, today and was evaluated and told pt may have had a stroke in eye prompting visit to the ED. denies weakness or numbness in arms or legs. no headache. denies any chest pain or shortness if breath. on a baby Aspirin every other day. non smoker. no EtOH drinker. no drug user
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
## 2022-04-15 NOTE — PATIENT PROFILE ADULT - WILL THE PATIENT ACCEPT THE PFIZER COVID-19 VACCINE IF ELIGIBLE AND IT IS AVAILABLE?
Telephone Encounter by Faizan Valdes RN at 05/12/17 08:22 AM     Author:  Faizan Valdes RN Service:  (none) Author Type:  Registered Nurse     Filed:  05/12/17 08:25 AM Encounter Date:  5/11/2017 Status:  Signed     :  Faizan Valdes RN (Registered Nurse)            Informed pt. that if at office visit, surgery needs scheduled then authorization will be obtained.[CG1.1M]      Revision History        User Key Date/Time User Provider Type Action    > CG1.1 05/12/17 08:25 AM Faizan Valdes RN Registered Nurse Sign    M - Manual             Not applicable

## 2022-04-15 NOTE — ED PROVIDER NOTE - PROGRESS NOTE DETAILS
Josie Felipe : spoke to Dr. Mondragon who requests MRI of occipital lobe to r/o occipital stroke. Josie Mcleod for Dr. Felipe : spoke to Basil, neurosurgery PA, who will come see the pt and who will put in MRI for the pt.

## 2022-04-15 NOTE — ED PROVIDER NOTE - NSICDXPASTSURGICALHX_GEN_ALL_CORE_FT
PAST SURGICAL HISTORY:  Elective surgery port placement 2017 port removal 2018    H/O esophagectomy 2015    H/O ventral hernia umbilical 1998    H/O: lung cancer lung resection LEFT 2017    History of bilateral cataract extraction     History of fusion of thoracic spine 2011    History of shoulder surgery bilateral rotator cuff    S/P carpal tunnel release rift 2014    S/P tonsillectomy and adenoidectomy     Wrist fracture, bilateral

## 2022-04-15 NOTE — ED PROVIDER NOTE - NSICDXPASTMEDICALHX_GEN_ALL_CORE_FT
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  s/p chemo finished  2/2018    GERD (gastroesophageal reflux disease)     HLD (hyperlipidemia)     HTN (hypertension)     Lung cancer s/p chemo 2018       No complaints

## 2022-04-15 NOTE — ED PROVIDER NOTE - EYES, MLM
Clear bilaterally, pupils equal, round and reactive to light. + deficit in the  left temporal vision and right nasal vision
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
## 2022-04-15 NOTE — PATIENT PROFILE ADULT - DO YOU FEEL UNSAFE AT HOME, WORK, OR SCHOOL?
RAFA SHAFER is a 60 year old male brought to ER after having severe cough for 2-3 days. Before patient's wife brought him to the ER after he coughed severely, fell to the floor, and had a seizure like activity. The patient have hyponatremia on ER blood work. Denies a history of CHF and liver diease. He is an active smoker. Admits to poor intake for the past 2-3 days. Denies imbalance, frequent falls, dizziness, lightheadedness, abd symptoms, urinary symptoms.   Past medical history of HTN, DM2. no

## 2022-04-15 NOTE — H&P ADULT - NSHPPHYSICALEXAM_GEN_ALL_CORE
Physical Exam:  Constitutional: Awake / alert  HEENT: PERRLA, EOMI  Neck: Supple  Respiratory: Breath sounds are clear bilaterally  Cardiovascular: S1 and S2, regular rhythm  Gastrointestinal: Soft, NT/ND  Extremities:  no edema  Vascular: No carotid Bruit  Musculoskeletal: no joint swelling/tenderness, no abnormal movements  Skin: No rashes    Neurological Exam:  HF: A x O x 3, appropriately interactive, normal affect, speech fluent, no aphasia or paraphasic errors. Naming /repetition intact   CN: PERRL, EOMI, +Left homonymous hemianopia, facial sensation normal, no NLFD, tongue midline  Motor: +Left pronator drift, Strength 5/5 in all 4 ext, normal bulk and tone, no tremor, rigidity or bradykinesia  Sens: Intact to light touch  Reflexes: Symmetric and normal, downgoing toes b/l  Coord:  No FNFA, dysmetria, DELTA intact   Gait/Balance: Cannot test
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
## 2022-04-15 NOTE — PATIENT PROFILE ADULT - FALL HARM RISK - HARM RISK INTERVENTIONS

## 2022-04-15 NOTE — CONSULT NOTE ADULT - NS ATTEND AMEND GEN_ALL_CORE FT
I saw the patient and agree with above.  History is concerning for a mass as he is describing subacute vision changes.  He has a left homonymous hemianopsia on visual field testing.    CT head shows:  Interval right parietal 3.4 x 3.5 x 4.3 cm, AP, TR, CC heterogeneous mass   with edema, cystic foci, involving  right occipital, posterior frontal,    temporal lobes and right lateral ventricle occipital horn concerning for   neoplasm, primary not excluded, more likely metastatic with additional   heterogeneous foci partially visualized, suggest contrast. Volume loss,   microvascular disease, effaced right ambient cistern, 2 mm leftward   shift. If symptoms persist consider follow-up CT with contrast/   Gadolinium MRI for improved assessment.      MRI brain with gado to be performed.  May also need CT chest/abdomen/pelvis to look for a primary.    Dr. Rivera will be on call starting 4/16 and will follow up as needed.

## 2022-04-16 PROCEDURE — 99232 SBSQ HOSP IP/OBS MODERATE 35: CPT

## 2022-04-16 RX ORDER — LANOLIN ALCOHOL/MO/W.PET/CERES
5 CREAM (GRAM) TOPICAL AT BEDTIME
Refills: 0 | Status: DISCONTINUED | OUTPATIENT
Start: 2022-04-16 | End: 2022-04-19

## 2022-04-16 RX ORDER — DEXAMETHASONE 0.5 MG/5ML
4 ELIXIR ORAL DAILY
Refills: 0 | Status: DISCONTINUED | OUTPATIENT
Start: 2022-04-16 | End: 2022-04-19

## 2022-04-16 RX ADMIN — TAMSULOSIN HYDROCHLORIDE 0.8 MILLIGRAM(S): 0.4 CAPSULE ORAL at 06:10

## 2022-04-16 RX ADMIN — PANTOPRAZOLE SODIUM 40 MILLIGRAM(S): 20 TABLET, DELAYED RELEASE ORAL at 06:10

## 2022-04-16 RX ADMIN — Medication 5 MILLIGRAM(S): at 21:35

## 2022-04-16 RX ADMIN — Medication 650 MILLIGRAM(S): at 02:15

## 2022-04-16 RX ADMIN — Medication 25 MILLIGRAM(S): at 06:10

## 2022-04-16 RX ADMIN — Medication 4 MILLIGRAM(S): at 13:57

## 2022-04-16 RX ADMIN — Medication 650 MILLIGRAM(S): at 10:35

## 2022-04-16 RX ADMIN — Medication 650 MILLIGRAM(S): at 03:00

## 2022-04-16 RX ADMIN — SIMVASTATIN 20 MILLIGRAM(S): 20 TABLET, FILM COATED ORAL at 06:10

## 2022-04-16 NOTE — PROGRESS NOTE ADULT - ASSESSMENT
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 CTH in ED demonstrated RT occipital mass with cerebral edema, follow-up MR redemonstrated RT occipital lesion with surrounding vasogenic edema.     Neuro:  Q4 neuro checks  Decadron 4mg Daily for cerebral edema  MRI DTI Sequence needed for operative planning  OR likely Tuesday or Friday this week  No seizure ppx as patient has not experienced any seizures    Incentive spirometry as tolerated  RA sats >92%  Cont home antihypertensive medications  Cont home statin  Tolerating diet  Protonix for GI PPX  IVL  Cont home Flomax for BPH    Hospitalist called for medical clearance    Discussed with patient and family plans, activity, and medications. Questions answered, and they verbalized understanding.    D/W. Dr Novoa 82 yo Right handed male PMH paroxysmal atrial flutter on Eliquis / ASA, esophageal carcinoma with mets to the lung s/p sx / chemo / RT, HTN, HLD, diverticulosis, GERD, BPH, presented to ED with c/o visual loss in left eye x few weeks. CTH in ED demonstrated RT occipital mass with cerebral edema, follow-up MR redemonstrated RT occipital lesion with surrounding vasogenic edema.     Neuro:  Neuro checks  Decadron 4mg Daily for cerebral edema  MRI DTI Sequence needed for operative planning  OR likely Tuesday or Friday this week  No seizure ppx as patient has not experienced any seizures    Incentive spirometry as tolerated  RA sats >92%  Cont home antihypertensive medications  Cont home statin  Tolerating diet  Protonix for GI PPX  IVL  Cont home Flomax for BPH    Hospitalist called for medical clearance    Discussed with patient and family plans, activity, and medications. Questions answered, and they verbalized understanding.    D/W. Dr Novoa

## 2022-04-16 NOTE — PROGRESS NOTE ADULT - SUBJECTIVE AND OBJECTIVE BOX
SUBJECTIVE:  Patient seen and examined with wife at bedside.  No acute complaints, no pain, no headaches.  Endorses LT visual problems.    OVERNIGHT EVENTS: Admitted yesterday, no acute events overnight    Vital Signs Last 24 Hrs  T(C): 36.9 (16 Apr 2022 08:18), Max: 36.9 (16 Apr 2022 08:18)  T(F): 98.4 (16 Apr 2022 08:18), Max: 98.4 (16 Apr 2022 08:18)  HR: 68 (16 Apr 2022 08:18) (65 - 74)  BP: 110/67 (16 Apr 2022 08:18) (110/67 - 152/83)  BP(mean): --  RR: 17 (16 Apr 2022 08:18) (17 - 18)  SpO2: 98% (16 Apr 2022 08:18) (96% - 100%)    PHYSICAL EXAM:    General: No Acute Distress     Neurological: Awake, alert oriented to person, place and time, Following Commands, PERRL, EOMI, DENSE LT FIELD CUT, Face Symmetrical, Speech Fluent, Moving all extremities, Muscle Strength normal in all four extremities, No Drift, Sensation to Light Touch Intact    Pulmonary: Clear to Auscultation, No Rales, No Rhonchi, No Wheezes   Cardiovascular:  Regular Rate and Rhythm   Gastrointestinal: Soft, Nontender, Nondistended     Incision: None    LABS:                        13.2   9.25  )-----------( 214      ( 15 Apr 2022 14:28 )             38.5    04-15    138  |  106  |  25<H>  ----------------------------<  111<H>  4.1   |  27  |  1.29    Ca    9.6      15 Apr 2022 14:28    TPro  7.5  /  Alb  3.7  /  TBili  0.3  /  DBili  x   /  AST  26  /  ALT  27  /  AlkPhos  68  04-15  PT/INR - ( 15 Apr 2022 14:28 )   PT: 11.9 sec;   INR: 1.03 ratio         PTT - ( 15 Apr 2022 14:28 )  PTT:32.8 sec      DRAINS:     MEDICATIONS  (STANDING):  dexAMETHasone     Tablet 4 milliGRAM(s) Oral daily for cerebral edema 2/2 to tumor  hydrochlorothiazide 25 milliGRAM(s) Oral daily  pantoprazole    Tablet 40 milliGRAM(s) Oral before breakfast  simvastatin 20 milliGRAM(s) Oral at bedtime  tamsulosin 0.8 milliGRAM(s) Oral at bedtime    MEDICATIONS  (PRN):  acetaminophen     Tablet .. 650 milliGRAM(s) Oral every 6 hours PRN Mild Pain (1 - 3)  metoclopramide Injectable 10 milliGRAM(s) IV Push every 6 hours PRN Nausea and/or Vomiting  ondansetron Injectable 4 milliGRAM(s) IV Push every 6 hours PRN Nausea and/or Vomiting  senna 2 Tablet(s) Oral at bedtime PRN Constipation    DIET: [] Regular [] CCD [] Renal [] Puree [] Dysphagia [] Tube Feeds:     IMAGING:     MR done and reviewed with Dr. Novoa    < from: CT Abdomen and Pelvis w/ IV Cont (04.15.22 @ 18:39) >  IMPRESSION:  *  Esophagectomy and pull-through.  *  No evidence of metastatic disease.    < end of copied text >    < from: CT Head No Cont (04.15.22 @ 15:26) >  IMPRESSION:    Interval right parietal 3.4 x 3.5 x 4.3 cm, AP, TR, CC heterogeneous mass   with edema, cystic foci, involving  right occipital, posterior frontal,    temporal lobes and right lateral ventricle occipital horn concerning for   neoplasm, primary not excluded, more likely metastatic with additional   heterogeneous foci partially visualized, suggest contrast. Volume loss,   microvascular disease, effaced right ambient cistern, 2 mm leftward   shift. If symptoms persist consider follow-up CT with contrast/   Gadolinium MRI for improved assessment.    < end of copied text >

## 2022-04-17 PROCEDURE — 99232 SBSQ HOSP IP/OBS MODERATE 35: CPT

## 2022-04-17 RX ADMIN — Medication 650 MILLIGRAM(S): at 21:52

## 2022-04-17 RX ADMIN — PANTOPRAZOLE SODIUM 40 MILLIGRAM(S): 20 TABLET, DELAYED RELEASE ORAL at 10:28

## 2022-04-17 RX ADMIN — Medication 5 MILLIGRAM(S): at 21:02

## 2022-04-17 RX ADMIN — SIMVASTATIN 20 MILLIGRAM(S): 20 TABLET, FILM COATED ORAL at 10:28

## 2022-04-17 RX ADMIN — TAMSULOSIN HYDROCHLORIDE 0.8 MILLIGRAM(S): 0.4 CAPSULE ORAL at 10:28

## 2022-04-17 RX ADMIN — Medication 4 MILLIGRAM(S): at 10:28

## 2022-04-17 RX ADMIN — Medication 25 MILLIGRAM(S): at 10:28

## 2022-04-17 RX ADMIN — Medication 650 MILLIGRAM(S): at 06:21

## 2022-04-17 RX ADMIN — Medication 650 MILLIGRAM(S): at 05:51
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
## 2022-04-17 NOTE — PROGRESS NOTE ADULT - ASSESSMENT
82 yo Right handed male PMH paroxysmal atrial flutter on Eliquis / ASA, esophageal carcinoma with mets to the lung s/p sx / chemo / RT, HTN, HLD, diverticulosis, GERD, BPH, presented to ED with c/o visual loss in left eye x few weeks. CTH in ED demonstrated RT occipital mass with cerebral edema, follow-up MR redemonstrated RT occipital lesion with surrounding vasogenic edema.     PLAN-  OR this week, Tuesday or Friday   Continue Decadron  Hold Eliquis / ASA   MRI with DTI mapping   Venodynes for DVT PPX   Pending Hospitalist consult for medical clearance  No seizure ppx as patient has not experienced any seizures

## 2022-04-18 LAB
24R-OH-CALCIDIOL SERPL-MCNC: 51.4 NG/ML — SIGNIFICANT CHANGE UP (ref 30–80)
A1C WITH ESTIMATED AVERAGE GLUCOSE RESULT: 6.2 % — HIGH (ref 4–5.6)
ADD ON TEST-SPECIMEN IN LAB: SIGNIFICANT CHANGE UP
ADD ON TEST-SPECIMEN IN LAB: SIGNIFICANT CHANGE UP
ALBUMIN SERPL ELPH-MCNC: 3.5 G/DL — SIGNIFICANT CHANGE UP (ref 3.3–5)
ALP SERPL-CCNC: 68 U/L — SIGNIFICANT CHANGE UP (ref 40–120)
ALT FLD-CCNC: 23 U/L — SIGNIFICANT CHANGE UP (ref 12–78)
ANION GAP SERPL CALC-SCNC: 5 MMOL/L — SIGNIFICANT CHANGE UP (ref 5–17)
APTT BLD: 30.4 SEC — SIGNIFICANT CHANGE UP (ref 27.5–35.5)
AST SERPL-CCNC: 18 U/L — SIGNIFICANT CHANGE UP (ref 15–37)
BILIRUB SERPL-MCNC: 0.3 MG/DL — SIGNIFICANT CHANGE UP (ref 0.2–1.2)
BUN SERPL-MCNC: 35 MG/DL — HIGH (ref 7–23)
CALCIUM SERPL-MCNC: 9.7 MG/DL — SIGNIFICANT CHANGE UP (ref 8.5–10.1)
CHLORIDE SERPL-SCNC: 104 MMOL/L — SIGNIFICANT CHANGE UP (ref 96–108)
CO2 SERPL-SCNC: 29 MMOL/L — SIGNIFICANT CHANGE UP (ref 22–31)
CREAT SERPL-MCNC: 1.55 MG/DL — HIGH (ref 0.5–1.3)
EGFR: 45 ML/MIN/1.73M2 — LOW
ESTIMATED AVERAGE GLUCOSE: 131 MG/DL — HIGH (ref 68–114)
FOLATE SERPL-MCNC: 14.1 NG/ML — SIGNIFICANT CHANGE UP
GLUCOSE SERPL-MCNC: 182 MG/DL — HIGH (ref 70–99)
HCT VFR BLD CALC: 37.3 % — LOW (ref 39–50)
HGB BLD-MCNC: 12.6 G/DL — LOW (ref 13–17)
INR BLD: 0.99 RATIO — SIGNIFICANT CHANGE UP (ref 0.88–1.16)
MAGNESIUM SERPL-MCNC: 2 MG/DL — SIGNIFICANT CHANGE UP (ref 1.6–2.6)
MCHC RBC-ENTMCNC: 32.6 PG — SIGNIFICANT CHANGE UP (ref 27–34)
MCHC RBC-ENTMCNC: 33.8 GM/DL — SIGNIFICANT CHANGE UP (ref 32–36)
MCV RBC AUTO: 96.6 FL — SIGNIFICANT CHANGE UP (ref 80–100)
PHOSPHATE SERPL-MCNC: 2.7 MG/DL — SIGNIFICANT CHANGE UP (ref 2.5–4.5)
PLATELET # BLD AUTO: 226 K/UL — SIGNIFICANT CHANGE UP (ref 150–400)
POTASSIUM SERPL-MCNC: 4.5 MMOL/L — SIGNIFICANT CHANGE UP (ref 3.5–5.3)
POTASSIUM SERPL-SCNC: 4.5 MMOL/L — SIGNIFICANT CHANGE UP (ref 3.5–5.3)
PROT SERPL-MCNC: 7.4 GM/DL — SIGNIFICANT CHANGE UP (ref 6–8.3)
PROTHROM AB SERPL-ACNC: 11.5 SEC — SIGNIFICANT CHANGE UP (ref 10.5–13.4)
RBC # BLD: 3.86 M/UL — LOW (ref 4.2–5.8)
RBC # FLD: 12.9 % — SIGNIFICANT CHANGE UP (ref 10.3–14.5)
SARS-COV-2 RNA SPEC QL NAA+PROBE: SIGNIFICANT CHANGE UP
SODIUM SERPL-SCNC: 138 MMOL/L — SIGNIFICANT CHANGE UP (ref 135–145)
TSH SERPL-MCNC: 0.56 UU/ML — SIGNIFICANT CHANGE UP (ref 0.34–4.82)
VIT B12 SERPL-MCNC: 573 PG/ML — SIGNIFICANT CHANGE UP (ref 232–1245)
WBC # BLD: 11.83 K/UL — HIGH (ref 3.8–10.5)
WBC # FLD AUTO: 11.83 K/UL — HIGH (ref 3.8–10.5)

## 2022-04-18 PROCEDURE — 99222 1ST HOSP IP/OBS MODERATE 55: CPT

## 2022-04-18 PROCEDURE — 70551 MRI BRAIN STEM W/O DYE: CPT | Mod: 26

## 2022-04-18 PROCEDURE — 99232 SBSQ HOSP IP/OBS MODERATE 35: CPT

## 2022-04-18 RX ORDER — SODIUM CHLORIDE 9 MG/ML
1000 INJECTION, SOLUTION INTRAVENOUS
Refills: 0 | Status: DISCONTINUED | OUTPATIENT
Start: 2022-04-18 | End: 2022-04-20

## 2022-04-18 RX ADMIN — Medication 25 MILLIGRAM(S): at 05:17

## 2022-04-18 RX ADMIN — PANTOPRAZOLE SODIUM 40 MILLIGRAM(S): 20 TABLET, DELAYED RELEASE ORAL at 05:17

## 2022-04-18 RX ADMIN — Medication 5 MILLIGRAM(S): at 22:12

## 2022-04-18 RX ADMIN — Medication 650 MILLIGRAM(S): at 22:42

## 2022-04-18 RX ADMIN — Medication 650 MILLIGRAM(S): at 04:01

## 2022-04-18 RX ADMIN — Medication 4 MILLIGRAM(S): at 05:16

## 2022-04-18 RX ADMIN — Medication 650 MILLIGRAM(S): at 10:57

## 2022-04-18 RX ADMIN — Medication 650 MILLIGRAM(S): at 22:12

## 2022-04-18 RX ADMIN — TAMSULOSIN HYDROCHLORIDE 0.8 MILLIGRAM(S): 0.4 CAPSULE ORAL at 05:16

## 2022-04-18 RX ADMIN — SODIUM CHLORIDE 80 MILLILITER(S): 9 INJECTION, SOLUTION INTRAVENOUS at 11:55

## 2022-04-18 RX ADMIN — SIMVASTATIN 20 MILLIGRAM(S): 20 TABLET, FILM COATED ORAL at 05:17

## 2022-04-18 RX ADMIN — Medication 650 MILLIGRAM(S): at 05:17
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
## 2022-04-18 NOTE — CONSULT NOTE ADULT - NS ATTEND AMEND GEN_ALL_CORE FT
Appreciate ACP help.  Pt seen and examined with BRANDIE Ribera; all labs and imaging reviewed together. POC discussed.  The above documentation reviewed by me and contains my recommendations.

## 2022-04-18 NOTE — CONSULT NOTE ADULT - ASSESSMENT
81M with PMHx of paroxysmal atrial flutter, s/p ablation on Asa 81mg 4x week, stopped Eliquis over a year ago, esophageal carcinoma with mets to the lung s/p sx / chemo / RT, HTN, HLD, diverticulosis, GERD, BPH, with c/o visual loss in left eye x few weeks found to have RT occipital lesion with surrounding vasogenic edema for right occipital craniotomy resection of mass in am.     Visual loss due to Right occipital lesion with surrounding vasogenic edema noted on imaging   - NPO after MN for right occipital craniotomy resection of mass in am with neurosurgery  - MRI as above   - No seizure Ppx per primary team  - Decadron; PPI  - Neuro checks q 8 hours  - HOB 15-30 degrees   - ICDs for VTE Ppx    ONEIL, likely drug induced (Contrast media, Meloxicam and HCTZ) baseline SCr 0.88  - Stop Meloxicam and HCTZ  - Started on IVF  - Avoid nephrotoxic agents   - Trend BMP    Hyperglycemia, likely drug induced (Decadron)  - f/u A1c    Paroxysmal atrial flutter  s/p ablation on Asa 81mg 4x week, pt reports he stopped Eliquis over a year ago   - Currently in NSR; EKG personally reviewed  - AC on hold    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 Vences    HTN   - controlled off meds    HLD  - Continue statin     GERD  - PPI    BPH  - Flomax     Code Status: Full code     VTE Ppx: ICDs    DISPO: per primary team.            81M with PMHx of paroxysmal atrial flutter, s/p ablation on Asa 81mg 4x week, stopped Eliquis over a year ago, esophageal carcinoma with mets to the lung s/p sx / chemo / RT, HTN, HLD, diverticulosis, GERD, BPH, with c/o visual loss in left eye x few weeks found to have RT occipital lesion with surrounding vasogenic edema for right occipital craniotomy resection of mass in am.     Visual loss due to Right occipital lesion with surrounding vasogenic edema noted on imaging   - NPO after MN for right occipital craniotomy resection of mass in am with neurosurgery  - MRI as above   - No seizure Ppx per primary team  - Decadron; PPI  - Neuro checks q 8 hours  - HOB 15-30 degrees   - ICDs for VTE Ppx    PREOP EVALUATION:  Pleasant 81 yr old male who lives at home with wife and carries all his ADLs without assistance.  Patient is able to climb up a flight of stairs without any cardiac or respiratory problems.  He occasionally uses a rollaider due to chronica back issues.  He denies h/o MI CVA or syncope.   He had AFLUTTER and underwent successful ablation with Dr Upton in 2018 and has been off AC for over a year without any symptoms  His Cr is slightly elevated but he has no chronic renal issues.   Weldon perioperative risk score - 0.4% 30-day risk of MI/CA   Patient may proceed to OR for brain mass resection without further testing.    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 Vences    HTN   - controlled off meds    HLD  - Continue statin     GERD  - PPI    BPH  - Flomax     Code Status: Full code     VTE Ppx: ICDs    DISPO: per primary team.
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
## 2022-04-18 NOTE — PROGRESS NOTE ADULT - SUBJECTIVE AND OBJECTIVE BOX
Patient has no new complaints today. Vision loss is unchanged.  MR head with contrast shows large, infiltrative mixed cystic solid appearing mass centered in the right parietal region with extensive vasogenic edema.    ROS: As stated above, all others are negative.    Vital Signs Last 24 Hrs  T(C): 36.6 (18 Apr 2022 09:17), Max: 36.8 (17 Apr 2022 15:37)  T(F): 97.9 (18 Apr 2022 09:17), Max: 98.2 (17 Apr 2022 15:37)  HR: 79 (18 Apr 2022 09:17) (60 - 79)  BP: 117/54 (18 Apr 2022 09:17) (108/58 - 117/54)  BP(mean): --  RR: 18 (18 Apr 2022 09:17) (18 - 18)  SpO2: 98% (18 Apr 2022 09:17) (98% - 98%)    MEDICATIONS  (STANDING):  dexAMETHasone     Tablet 4 milliGRAM(s) Oral daily  pantoprazole    Tablet 40 milliGRAM(s) Oral before breakfast  simvastatin 20 milliGRAM(s) Oral at bedtime  sodium chloride 0.45%. 1000 milliLiter(s) (80 mL/Hr) IV Continuous <Continuous>  tamsulosin 0.8 milliGRAM(s) Oral at bedtime    MEDICATIONS  (PRN):  acetaminophen     Tablet .. 650 milliGRAM(s) Oral every 6 hours PRN Mild Pain (1 - 3)  melatonin 5 milliGRAM(s) Oral at bedtime PRN Sleep  metoclopramide Injectable 10 milliGRAM(s) IV Push every 6 hours PRN Nausea and/or Vomiting  ondansetron Injectable 4 milliGRAM(s) IV Push every 6 hours PRN Nausea and/or Vomiting  senna 2 Tablet(s) Oral at bedtime PRN Constipation    PHYSICAL EXAM:    General: Normocephalic, NAD  HEENT: PERRLA, EOMI    Neurological exam:  HF: A x O x 3. Appropriately interactive, normal affect. Speech fluent, No Aphasia or paraphasic errors. Naming /repetition intact   CN: pupils - chemically dilated from optometry visit and minimally reactive, EOMI, left homonymous hemianopsia,  facial sensation normal, no NLFD, tongue midline, Palate moves equally, SCM equal bilaterally  Motor: No pronator drift, Strength 5/5 in all 4 ext, normal bulk and tone, no tremor, rigidity or bradykinesia.    Sens: Intact to light touch throughout  Reflexes: Symmetric and normal, downgoing toes b/l  Coord:  No FNFA, dysmetria  Gait/Balance: Cannot test    NIHSS: 1    RESULT SUMMARY:  1 points  NIH Stroke Scale    INPUTS:  1A: Level of consciousness —> 0 = Alert; keenly responsive  1B: Ask month and age —> 0 = Both questions right  1C: 'Blink eyes' & 'squeeze hands' —> 0 = Performs both tasks  2: Horizontal extraocular movements —> 0 = Normal  3: Visual fields —> 1 = Partial hemianopia  4: Facial palsy —> 0 = Normal symmetry  5A: Left arm motor drift —> 0 = No drift for 10 seconds  5B: Right arm motor drift —> 0 = No drift for 10 seconds  6A: Left leg motor drift —> 0 = No drift for 5 seconds  6B: Right leg motor drift —> 0 = No drift for 5 seconds  7: Limb Ataxia —> 0 = No ataxia  8: Sensation —> 0 = Normal; no sensory loss  9: Language/aphasia —> 0 = Normal; no aphasia  10: Dysarthria —> 0 = Normal  11: Extinction/inattention —> 0 = No abnormality    RESULT SUMMARY:  0 points  Modified Norton Scale    INPUTS:  Patient's Baseline Activity —> 0 = No symptoms at all      LABS:                         12.6   11.83 )-----------( 226      ( 18 Apr 2022 09:08 )             37.3     04-18    138  |  104  |  35<H>  ----------------------------<  182<H>  4.5   |  29  |  1.55<H>    Ca    9.7      18 Apr 2022 09:08  Phos  2.7     04-18  Mg     2.0     04-18    TPro  7.4  /  Alb  3.5  /  TBili  0.3  /  DBili  x   /  AST  18  /  ALT  23  /  AlkPhos  68  04-18    LIVER FUNCTIONS - ( 18 Apr 2022 09:08 )  Alb: 3.5 g/dL / Pro: 7.4 gm/dL / ALK PHOS: 68 U/L / ALT: 23 U/L / AST: 18 U/L / GGT: x           RADIOLOGY:   CT Head No Cont (04.15.22 @ 15:26) >    IMPRESSION:    Interval right parietal 3.4 x 3.5 x 4.3 cm, AP, TR, CC heterogeneous mass   with edema, cystic foci, involving  right occipital, posterior frontal,    temporal lobes and right lateral ventricle occipital horn concerning for   neoplasm, primary not excluded, more likely metastatic with additional   heterogeneous foci partially visualized, suggest contrast. Volume loss,   microvascular disease, effaced right ambient cistern, 2 mm leftward   shift. If symptoms persist consider follow-up CT with contrast/   Gadolinium MRI for improved assessment.    Findings discussed with Dr. Estrada immediate time of review, 4/15/2022 at   3:38 PM.    MR Head w/wo IV Cont (04.15.22 @ 21:16) >  IMPRESSION:    1)  a a large, infiltrative mixed cystic solid appearing mass is noted   centered in the right parietal region with extensive vasogenic edema. The   tumor measures approximately 4 x 3.5 x 2.8 cm. There is probable   intraventricular extension on the right as well, and further workup is   recommended to assess for possible CNS seeding and leptomeningeal spread   of tumor. See detail above.  2)  the above findings suggest a primary neoplasm of the brain, either a   glioblastoma multiforme, or a high-grade glioma. Further workup and   assessment recommended.    < from: CT Head No Cont (09.21.19 @ 16:43)     INTERPRETATION:  Fall.    Noncontrast head CT.    Age-appropriate brain volume and mild chronic ischemic cerebral white   matter changes. Chronic lacunar infarct versusprominent perivascular   space right inferior basal ganglia similar to prior no acute infarct or   bleed. No extra-axial collection or mass effect. Extensive   consolidation/fluid partially opacifying the bilateral nasal cavities,   bilateral ethmoid sinuses. Fluid level left sphenoid sinuses similar to   prior. Correlate clinically for acute sinusitis. Right periorbital soft   tissue swelling. Cranium intact.    Impression: No bleed. Findings as discussed
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
## 2022-04-18 NOTE — PROGRESS NOTE ADULT - ASSESSMENT
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 CTH in ED demonstrated RT occipital mass with cerebral edema, follow-up MR redemonstrated RT occipital lesion with surrounding vasogenic edema.     PLAN-  OR tomorrow AM for right occipital craniotomy resection of mass  NPO after MN  Preop labs  MRI Head DTI sequence this AM  Continue Decadron  Hold Eliquis / ASA   Venodynes for DVT PPX   Pending Hospitalist consult for medical clearance  No seizure ppx as patient has not experienced any seizures    Discussed with Dr. Novoa

## 2022-04-19 ENCOUNTER — RESULT REVIEW (OUTPATIENT)
Age: 82
End: 2022-04-19

## 2022-04-19 ENCOUNTER — APPOINTMENT (OUTPATIENT)
Dept: NEUROSURGERY | Facility: HOSPITAL | Age: 82
End: 2022-04-19

## 2022-04-19 LAB
ALBUMIN SERPL ELPH-MCNC: 2.7 G/DL — LOW (ref 3.3–5)
ALBUMIN SERPL ELPH-MCNC: 3.4 G/DL — SIGNIFICANT CHANGE UP (ref 3.3–5)
ALP SERPL-CCNC: 57 U/L — SIGNIFICANT CHANGE UP (ref 40–120)
ALP SERPL-CCNC: 67 U/L — SIGNIFICANT CHANGE UP (ref 40–120)
ALT FLD-CCNC: 21 U/L — SIGNIFICANT CHANGE UP (ref 12–78)
ALT FLD-CCNC: 24 U/L — SIGNIFICANT CHANGE UP (ref 12–78)
ANION GAP SERPL CALC-SCNC: 5 MMOL/L — SIGNIFICANT CHANGE UP (ref 5–17)
ANION GAP SERPL CALC-SCNC: 6 MMOL/L — SIGNIFICANT CHANGE UP (ref 5–17)
AST SERPL-CCNC: 17 U/L — SIGNIFICANT CHANGE UP (ref 15–37)
AST SERPL-CCNC: 19 U/L — SIGNIFICANT CHANGE UP (ref 15–37)
BILIRUB SERPL-MCNC: 0.1 MG/DL — LOW (ref 0.2–1.2)
BILIRUB SERPL-MCNC: 0.2 MG/DL — SIGNIFICANT CHANGE UP (ref 0.2–1.2)
BUN SERPL-MCNC: 25 MG/DL — HIGH (ref 7–23)
BUN SERPL-MCNC: 33 MG/DL — HIGH (ref 7–23)
CALCIUM SERPL-MCNC: 8.3 MG/DL — LOW (ref 8.5–10.1)
CALCIUM SERPL-MCNC: 9.3 MG/DL — SIGNIFICANT CHANGE UP (ref 8.5–10.1)
CHLORIDE SERPL-SCNC: 105 MMOL/L — SIGNIFICANT CHANGE UP (ref 96–108)
CHLORIDE SERPL-SCNC: 110 MMOL/L — HIGH (ref 96–108)
CO2 SERPL-SCNC: 23 MMOL/L — SIGNIFICANT CHANGE UP (ref 22–31)
CO2 SERPL-SCNC: 28 MMOL/L — SIGNIFICANT CHANGE UP (ref 22–31)
CREAT SERPL-MCNC: 1.21 MG/DL — SIGNIFICANT CHANGE UP (ref 0.5–1.3)
CREAT SERPL-MCNC: 1.23 MG/DL — SIGNIFICANT CHANGE UP (ref 0.5–1.3)
EGFR: 59 ML/MIN/1.73M2 — LOW
EGFR: 60 ML/MIN/1.73M2 — SIGNIFICANT CHANGE UP
GLUCOSE SERPL-MCNC: 100 MG/DL — HIGH (ref 70–99)
GLUCOSE SERPL-MCNC: 152 MG/DL — HIGH (ref 70–99)
HCT VFR BLD CALC: 33.5 % — LOW (ref 39–50)
HCT VFR BLD CALC: 36.1 % — LOW (ref 39–50)
HGB BLD-MCNC: 11.5 G/DL — LOW (ref 13–17)
HGB BLD-MCNC: 12.4 G/DL — LOW (ref 13–17)
MAGNESIUM SERPL-MCNC: 1.9 MG/DL — SIGNIFICANT CHANGE UP (ref 1.6–2.6)
MCHC RBC-ENTMCNC: 32.7 PG — SIGNIFICANT CHANGE UP (ref 27–34)
MCHC RBC-ENTMCNC: 33.2 PG — SIGNIFICANT CHANGE UP (ref 27–34)
MCHC RBC-ENTMCNC: 34.3 GM/DL — SIGNIFICANT CHANGE UP (ref 32–36)
MCHC RBC-ENTMCNC: 34.3 GM/DL — SIGNIFICANT CHANGE UP (ref 32–36)
MCV RBC AUTO: 95.3 FL — SIGNIFICANT CHANGE UP (ref 80–100)
MCV RBC AUTO: 96.8 FL — SIGNIFICANT CHANGE UP (ref 80–100)
PHOSPHATE SERPL-MCNC: 2.6 MG/DL — SIGNIFICANT CHANGE UP (ref 2.5–4.5)
PLATELET # BLD AUTO: 216 K/UL — SIGNIFICANT CHANGE UP (ref 150–400)
PLATELET # BLD AUTO: 235 K/UL — SIGNIFICANT CHANGE UP (ref 150–400)
POTASSIUM SERPL-MCNC: 3.9 MMOL/L — SIGNIFICANT CHANGE UP (ref 3.5–5.3)
POTASSIUM SERPL-MCNC: 4.2 MMOL/L — SIGNIFICANT CHANGE UP (ref 3.5–5.3)
POTASSIUM SERPL-SCNC: 3.9 MMOL/L — SIGNIFICANT CHANGE UP (ref 3.5–5.3)
POTASSIUM SERPL-SCNC: 4.2 MMOL/L — SIGNIFICANT CHANGE UP (ref 3.5–5.3)
PROT SERPL-MCNC: 6.1 GM/DL — SIGNIFICANT CHANGE UP (ref 6–8.3)
PROT SERPL-MCNC: 6.8 GM/DL — SIGNIFICANT CHANGE UP (ref 6–8.3)
RBC # BLD: 3.46 M/UL — LOW (ref 4.2–5.8)
RBC # BLD: 3.79 M/UL — LOW (ref 4.2–5.8)
RBC # FLD: 12.7 % — SIGNIFICANT CHANGE UP (ref 10.3–14.5)
RBC # FLD: 12.7 % — SIGNIFICANT CHANGE UP (ref 10.3–14.5)
SODIUM SERPL-SCNC: 138 MMOL/L — SIGNIFICANT CHANGE UP (ref 135–145)
SODIUM SERPL-SCNC: 139 MMOL/L — SIGNIFICANT CHANGE UP (ref 135–145)
WBC # BLD: 12.39 K/UL — HIGH (ref 3.8–10.5)
WBC # BLD: 9.9 K/UL — SIGNIFICANT CHANGE UP (ref 3.8–10.5)
WBC # FLD AUTO: 12.39 K/UL — HIGH (ref 3.8–10.5)
WBC # FLD AUTO: 9.9 K/UL — SIGNIFICANT CHANGE UP (ref 3.8–10.5)

## 2022-04-19 PROCEDURE — 70450 CT HEAD/BRAIN W/O DYE: CPT | Mod: 26

## 2022-04-19 PROCEDURE — 88331 PATH CONSLTJ SURG 1 BLK 1SPC: CPT | Mod: 26

## 2022-04-19 PROCEDURE — 99233 SBSQ HOSP IP/OBS HIGH 50: CPT

## 2022-04-19 PROCEDURE — 69990 MICROSURGERY ADD-ON: CPT | Mod: 59

## 2022-04-19 PROCEDURE — 88360 TUMOR IMMUNOHISTOCHEM/MANUAL: CPT | Mod: 26

## 2022-04-19 PROCEDURE — 88342 IMHCHEM/IMCYTCHM 1ST ANTB: CPT | Mod: 26,59

## 2022-04-19 PROCEDURE — 61781 SCAN PROC CRANIAL INTRA: CPT

## 2022-04-19 PROCEDURE — 88341 IMHCHEM/IMCYTCHM EA ADD ANTB: CPT | Mod: 26,59

## 2022-04-19 PROCEDURE — 88307 TISSUE EXAM BY PATHOLOGIST: CPT | Mod: 26

## 2022-04-19 PROCEDURE — 61510 CRNEC TREPH EXC BRN TUM STTL: CPT

## 2022-04-19 RX ORDER — ACETAMINOPHEN 500 MG
1000 TABLET ORAL ONCE
Refills: 0 | Status: COMPLETED | OUTPATIENT
Start: 2022-04-19 | End: 2022-04-19

## 2022-04-19 RX ORDER — ONDANSETRON 8 MG/1
4 TABLET, FILM COATED ORAL EVERY 6 HOURS
Refills: 0 | Status: DISCONTINUED | OUTPATIENT
Start: 2022-04-19 | End: 2022-04-21

## 2022-04-19 RX ORDER — SENNA PLUS 8.6 MG/1
2 TABLET ORAL AT BEDTIME
Refills: 0 | Status: DISCONTINUED | OUTPATIENT
Start: 2022-04-19 | End: 2022-04-21

## 2022-04-19 RX ORDER — FENTANYL CITRATE 50 UG/ML
50 INJECTION INTRAVENOUS
Refills: 0 | Status: DISCONTINUED | OUTPATIENT
Start: 2022-04-19 | End: 2022-04-19

## 2022-04-19 RX ORDER — FENTANYL CITRATE 50 UG/ML
25 INJECTION INTRAVENOUS
Refills: 0 | Status: DISCONTINUED | OUTPATIENT
Start: 2022-04-19 | End: 2022-04-21

## 2022-04-19 RX ORDER — TAMSULOSIN HYDROCHLORIDE 0.4 MG/1
0.8 CAPSULE ORAL AT BEDTIME
Refills: 0 | Status: DISCONTINUED | OUTPATIENT
Start: 2022-04-19 | End: 2022-04-21

## 2022-04-19 RX ORDER — DEXAMETHASONE 0.5 MG/5ML
4 ELIXIR ORAL DAILY
Refills: 0 | Status: DISCONTINUED | OUTPATIENT
Start: 2022-04-19 | End: 2022-04-21

## 2022-04-19 RX ORDER — ONDANSETRON 8 MG/1
4 TABLET, FILM COATED ORAL ONCE
Refills: 0 | Status: DISCONTINUED | OUTPATIENT
Start: 2022-04-19 | End: 2022-04-19

## 2022-04-19 RX ORDER — PANTOPRAZOLE SODIUM 20 MG/1
40 TABLET, DELAYED RELEASE ORAL
Refills: 0 | Status: DISCONTINUED | OUTPATIENT
Start: 2022-04-19 | End: 2022-04-21

## 2022-04-19 RX ORDER — OXYCODONE HYDROCHLORIDE 5 MG/1
5 TABLET ORAL ONCE
Refills: 0 | Status: DISCONTINUED | OUTPATIENT
Start: 2022-04-19 | End: 2022-04-19

## 2022-04-19 RX ORDER — SODIUM CHLORIDE 9 MG/ML
1000 INJECTION, SOLUTION INTRAVENOUS
Refills: 0 | Status: DISCONTINUED | OUTPATIENT
Start: 2022-04-19 | End: 2022-04-19

## 2022-04-19 RX ORDER — OXYCODONE HYDROCHLORIDE 5 MG/1
5 TABLET ORAL ONCE
Refills: 0 | Status: DISCONTINUED | OUTPATIENT
Start: 2022-04-19 | End: 2022-04-20

## 2022-04-19 RX ORDER — SIMVASTATIN 20 MG/1
20 TABLET, FILM COATED ORAL AT BEDTIME
Refills: 0 | Status: DISCONTINUED | OUTPATIENT
Start: 2022-04-19 | End: 2022-04-21

## 2022-04-19 RX ORDER — ACETAMINOPHEN 500 MG
1000 TABLET ORAL ONCE
Refills: 0 | Status: DISCONTINUED | OUTPATIENT
Start: 2022-04-19 | End: 2022-04-21

## 2022-04-19 RX ADMIN — SODIUM CHLORIDE 125 MILLILITER(S): 9 INJECTION, SOLUTION INTRAVENOUS at 15:35

## 2022-04-19 RX ADMIN — Medication 1000 MILLIGRAM(S): at 18:00

## 2022-04-19 RX ADMIN — FENTANYL CITRATE 50 MICROGRAM(S): 50 INJECTION INTRAVENOUS at 16:00

## 2022-04-19 RX ADMIN — Medication 400 MILLIGRAM(S): at 17:27

## 2022-04-19 RX ADMIN — FENTANYL CITRATE 50 MICROGRAM(S): 50 INJECTION INTRAVENOUS at 15:30

## 2022-04-19 RX ADMIN — FENTANYL CITRATE 50 MICROGRAM(S): 50 INJECTION INTRAVENOUS at 17:00

## 2022-04-19 RX ADMIN — FENTANYL CITRATE 50 MICROGRAM(S): 50 INJECTION INTRAVENOUS at 15:45

## 2022-04-19 RX ADMIN — FENTANYL CITRATE 50 MICROGRAM(S): 50 INJECTION INTRAVENOUS at 16:51

## 2022-04-19 RX ADMIN — SODIUM CHLORIDE 80 MILLILITER(S): 9 INJECTION, SOLUTION INTRAVENOUS at 00:06

## 2022-04-19 NOTE — PROGRESS NOTE ADULT - ASSESSMENT
81M with PMHx of paroxysmal atrial flutter, s/p ablation on Asa 81mg 4x week, stopped Eliquis over a year ago, esophageal carcinoma with mets to the lung s/p sx / chemo / RT, HTN, HLD, diverticulosis, GERD, BPH, with c/o visual loss in left eye x few weeks found to have RT occipital lesion with surrounding vasogenic edema for right occipital craniotomy resection of mass in am.     Visual loss due to Right occipital lesion with surrounding vasogenic edema noted on imaging   - NPO after MN for right occipital craniotomy resection of mass in am with neurosurgery  - MRI as above   - No seizure Ppx per primary team  - Decadron; PPI  - Neuro checks q 8 hours  - HOB 15-30 degrees   - ICDs for VTE Ppx      ONEIL, likely drug induced (Contrast media, Meloxicam and HCTZ) baseline SCr 0.88  - Stop Meloxicam and HCTZ  - Started on IVF  - Avoid nephrotoxic agents   - Trend BMP    Hyperglycemia, likely drug induced (Decadron)  - f/u A1c    Paroxysmal atrial flutter  s/p ablation on Asa 81mg 4x week, pt reports he stopped Eliquis over a year ago   - Currently in NSR; EKG personally reviewed  - patient  not on anticoagulation at home; ASA on hold     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 Vences    HTN   - controlled off meds    HLD  - Continue statin     GERD  - PPI    BPH  - Flomax     Code Status: Full code     VTE Ppx: ICDs    DISPO: per primary team.            81M with PMHx of paroxysmal atrial flutter, s/p ablation on Asa 81mg 4x week, stopped Eliquis over a year ago, esophageal carcinoma with mets to the lung s/p sx / chemo / RT, HTN, HLD, diverticulosis, GERD, BPH, with c/o visual loss in left eye x few weeks found to have RT occipital lesion with surrounding vasogenic edema for right occipital craniotomy resection of mass in am.     Visual loss due to Right occipital lesion with surrounding vasogenic edema noted on imaging   POD #0 right occipital craniotomy and resection of tumor with neuronavigation  - Frozen section pathology consistent with adenocarcinoma metastasis   - MRI as above   - No seizure Ppx per primary team  - Decadron; PPI per neuro sx team   - Neuro checks per neuro sx team   - ICDs for VTE Ppx    ONEIL, likely drug induced (Contrast media, Meloxicam and HCTZ) baseline SCr 0.88, improving   - Stop Meloxicam and HCTZ  - Continue IVF  - Avoid nephrotoxic agents   - Trend BMP    Hyperglycemia due to pre diabetes  - A1c 6.2  - DM diet when able     Paroxysmal atrial flutter  s/p ablation on Asa 81mg 4x week, pt reports he stopped Eliquis over a year ago   - Currently in NSR; EKG personally reviewed  - patient not on anticoagulation at home; ASA on hold     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 Vences    HTN   - controlled off meds    HLD  - Continue statin     GERD  - PPI    BPH  - Flomax     Code Status: Full code     VTE Ppx: ICDs    DISPO: per primary team.

## 2022-04-19 NOTE — BRIEF OPERATIVE NOTE - NSICDXBRIEFPOSTOP_GEN_ALL_CORE_FT
POST-OP DIAGNOSIS:  Metastasis to brain 19-Apr-2022 14:29:19  Rene Novoa  Vasogenic brain edema 19-Apr-2022 14:29:34  Rene Novoa
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
## 2022-04-19 NOTE — PROGRESS NOTE ADULT - NS ATTEND AMEND GEN_ALL_CORE FT
Appreciate ACP help.  Pt seen and examined with BRANDIE Ribera; all labs and imaging reviewed together. POC discussed.  The above documentation reviewed by me and contains my recommendations. Appreciate ACP help.  Pt seen post-op, conversant, moving all four extremities, vitals stable, now in ICU; all labs and imaging reviewed together. POC discussed.  Further mx per primary team.   Pls call with q  [phone]

## 2022-04-19 NOTE — BRIEF OPERATIVE NOTE - NSICDXBRIEFPREOP_GEN_ALL_CORE_FT
PRE-OP DIAGNOSIS:  Lesion of brain 19-Apr-2022 14:28:48  Rene Novoa  Vasogenic brain edema 19-Apr-2022 14:29:00  Rene Novoa

## 2022-04-19 NOTE — BRIEF OPERATIVE NOTE - OPERATION/FINDINGS
right occipital craniotomy and resection of tumor with neuronavigation , Brain Path, and neuromonitoring. frozen section pathology consistent with adenocarcinoma metastasis. Neuromonitoring stable throughout for motor and sensory

## 2022-04-19 NOTE — PROGRESS NOTE ADULT - ASSESSMENT
IMP:    81 /o male Right handed male PMH paroxysmal atrial flutter on Eliquis / ASA, esophageal carcinoma with mets to the lung s/p sx / chemo / RT, HTN, HLD, diverticulosis, GERD and BPH presents with L sided visual loss found to have R occipital tumor--POD # 0 S/P R occipital Crani and tumor resection    High risk for acute decompensation and deterioration including death     Suggest:    Admit to ICU    Neuro checks  Hemodynamic monitoring  Pain control  IS  Resume DOAC when OK with NSGY  DVT prophy--SCD    ICU care-- d/w pt and All concerns addressed including but not limited to diagnosis, treatment plan and overall prognosis
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
## 2022-04-20 LAB
ALBUMIN SERPL ELPH-MCNC: 2.8 G/DL — LOW (ref 3.3–5)
ALP SERPL-CCNC: 60 U/L — SIGNIFICANT CHANGE UP (ref 40–120)
ALT FLD-CCNC: 19 U/L — SIGNIFICANT CHANGE UP (ref 12–78)
ANION GAP SERPL CALC-SCNC: 5 MMOL/L — SIGNIFICANT CHANGE UP (ref 5–17)
AST SERPL-CCNC: 17 U/L — SIGNIFICANT CHANGE UP (ref 15–37)
BASOPHILS # BLD AUTO: 0.01 K/UL — SIGNIFICANT CHANGE UP (ref 0–0.2)
BASOPHILS NFR BLD AUTO: 0.1 % — SIGNIFICANT CHANGE UP (ref 0–2)
BILIRUB SERPL-MCNC: 0.2 MG/DL — SIGNIFICANT CHANGE UP (ref 0.2–1.2)
BUN SERPL-MCNC: 25 MG/DL — HIGH (ref 7–23)
CALCIUM SERPL-MCNC: 8.8 MG/DL — SIGNIFICANT CHANGE UP (ref 8.5–10.1)
CHLORIDE SERPL-SCNC: 108 MMOL/L — SIGNIFICANT CHANGE UP (ref 96–108)
CO2 SERPL-SCNC: 24 MMOL/L — SIGNIFICANT CHANGE UP (ref 22–31)
CREAT SERPL-MCNC: 1.07 MG/DL — SIGNIFICANT CHANGE UP (ref 0.5–1.3)
EGFR: 70 ML/MIN/1.73M2 — SIGNIFICANT CHANGE UP
EOSINOPHIL # BLD AUTO: 0.01 K/UL — SIGNIFICANT CHANGE UP (ref 0–0.5)
EOSINOPHIL NFR BLD AUTO: 0.1 % — SIGNIFICANT CHANGE UP (ref 0–6)
GLUCOSE SERPL-MCNC: 108 MG/DL — HIGH (ref 70–99)
HCT VFR BLD CALC: 31.8 % — LOW (ref 39–50)
HGB BLD-MCNC: 11.1 G/DL — LOW (ref 13–17)
IMM GRANULOCYTES NFR BLD AUTO: 0.6 % — SIGNIFICANT CHANGE UP (ref 0–1.5)
LYMPHOCYTES # BLD AUTO: 0.89 K/UL — LOW (ref 1–3.3)
LYMPHOCYTES # BLD AUTO: 7 % — LOW (ref 13–44)
MAGNESIUM SERPL-MCNC: 1.7 MG/DL — SIGNIFICANT CHANGE UP (ref 1.6–2.6)
MCHC RBC-ENTMCNC: 33.5 PG — SIGNIFICANT CHANGE UP (ref 27–34)
MCHC RBC-ENTMCNC: 34.9 GM/DL — SIGNIFICANT CHANGE UP (ref 32–36)
MCV RBC AUTO: 96.1 FL — SIGNIFICANT CHANGE UP (ref 80–100)
MONOCYTES # BLD AUTO: 1.01 K/UL — HIGH (ref 0–0.9)
MONOCYTES NFR BLD AUTO: 7.9 % — SIGNIFICANT CHANGE UP (ref 2–14)
NEUTROPHILS # BLD AUTO: 10.73 K/UL — HIGH (ref 1.8–7.4)
NEUTROPHILS NFR BLD AUTO: 84.3 % — HIGH (ref 43–77)
PHOSPHATE SERPL-MCNC: 3.1 MG/DL — SIGNIFICANT CHANGE UP (ref 2.5–4.5)
PLATELET # BLD AUTO: 215 K/UL — SIGNIFICANT CHANGE UP (ref 150–400)
POTASSIUM SERPL-MCNC: 4.1 MMOL/L — SIGNIFICANT CHANGE UP (ref 3.5–5.3)
POTASSIUM SERPL-SCNC: 4.1 MMOL/L — SIGNIFICANT CHANGE UP (ref 3.5–5.3)
PROT SERPL-MCNC: 6 GM/DL — SIGNIFICANT CHANGE UP (ref 6–8.3)
RBC # BLD: 3.31 M/UL — LOW (ref 4.2–5.8)
RBC # FLD: 12.8 % — SIGNIFICANT CHANGE UP (ref 10.3–14.5)
SODIUM SERPL-SCNC: 137 MMOL/L — SIGNIFICANT CHANGE UP (ref 135–145)
WBC # BLD: 12.73 K/UL — HIGH (ref 3.8–10.5)
WBC # FLD AUTO: 12.73 K/UL — HIGH (ref 3.8–10.5)

## 2022-04-20 PROCEDURE — 70553 MRI BRAIN STEM W/O & W/DYE: CPT | Mod: 26

## 2022-04-20 PROCEDURE — 99232 SBSQ HOSP IP/OBS MODERATE 35: CPT

## 2022-04-20 RX ORDER — APIXABAN 2.5 MG/1
0 TABLET, FILM COATED ORAL
Qty: 0 | Refills: 0 | DISCHARGE

## 2022-04-20 RX ORDER — ACETAMINOPHEN 500 MG
1000 TABLET ORAL ONCE
Refills: 0 | Status: COMPLETED | OUTPATIENT
Start: 2022-04-20 | End: 2022-04-20

## 2022-04-20 RX ORDER — OXYCODONE HYDROCHLORIDE 5 MG/1
5 TABLET ORAL EVERY 4 HOURS
Refills: 0 | Status: DISCONTINUED | OUTPATIENT
Start: 2022-04-20 | End: 2022-04-21

## 2022-04-20 RX ORDER — OXYCODONE HYDROCHLORIDE 5 MG/1
5 TABLET ORAL ONCE
Refills: 0 | Status: DISCONTINUED | OUTPATIENT
Start: 2022-04-20 | End: 2022-04-20

## 2022-04-20 RX ORDER — ASPIRIN/CALCIUM CARB/MAGNESIUM 324 MG
81 TABLET ORAL
Qty: 0 | Refills: 0 | DISCHARGE

## 2022-04-20 RX ADMIN — TAMSULOSIN HYDROCHLORIDE 0.8 MILLIGRAM(S): 0.4 CAPSULE ORAL at 10:13

## 2022-04-20 RX ADMIN — FENTANYL CITRATE 25 MICROGRAM(S): 50 INJECTION INTRAVENOUS at 10:13

## 2022-04-20 RX ADMIN — OXYCODONE HYDROCHLORIDE 5 MILLIGRAM(S): 5 TABLET ORAL at 20:58

## 2022-04-20 RX ADMIN — OXYCODONE HYDROCHLORIDE 5 MILLIGRAM(S): 5 TABLET ORAL at 03:00

## 2022-04-20 RX ADMIN — FENTANYL CITRATE 25 MICROGRAM(S): 50 INJECTION INTRAVENOUS at 10:43

## 2022-04-20 RX ADMIN — FENTANYL CITRATE 25 MICROGRAM(S): 50 INJECTION INTRAVENOUS at 14:21

## 2022-04-20 RX ADMIN — PANTOPRAZOLE SODIUM 40 MILLIGRAM(S): 20 TABLET, DELAYED RELEASE ORAL at 07:22

## 2022-04-20 RX ADMIN — Medication 4 MILLIGRAM(S): at 09:59

## 2022-04-20 RX ADMIN — SIMVASTATIN 20 MILLIGRAM(S): 20 TABLET, FILM COATED ORAL at 10:13

## 2022-04-20 RX ADMIN — OXYCODONE HYDROCHLORIDE 5 MILLIGRAM(S): 5 TABLET ORAL at 02:07

## 2022-04-20 RX ADMIN — SENNA PLUS 2 TABLET(S): 8.6 TABLET ORAL at 20:57

## 2022-04-20 RX ADMIN — FENTANYL CITRATE 25 MICROGRAM(S): 50 INJECTION INTRAVENOUS at 13:59

## 2022-04-20 RX ADMIN — OXYCODONE HYDROCHLORIDE 5 MILLIGRAM(S): 5 TABLET ORAL at 21:20

## 2022-04-20 RX ADMIN — Medication 400 MILLIGRAM(S): at 00:22

## 2022-04-20 NOTE — PHYSICAL THERAPY INITIAL EVALUATION ADULT - MODALITIES TREATMENT COMMENTS
Pt in chair after session, +alarm, CBIR, ICU monitors+, Skaggs+, Drain+, denies dizziness, NAD, RN aware, VSS. pain 3/10 posterior cranium. Family present.

## 2022-04-20 NOTE — PROGRESS NOTE ADULT - ASSESSMENT
· Assessment	  82 yo Right handed male PMH paroxysmal atrial flutter on Eliquis / ASA, esophageal carcinoma with mets to the lung s/p sx / chemo / RT, HTN, HLD, diverticulosis, GERD, BPH, presented to ED with c/o visual loss in left eye x few weeks. CTH in ED demonstrated RT occipital mass with cerebral edema, follow-up MR redemonstrated RT occipital lesion with surrounding vasogenic edema.     PLAN-  POD#1 craniotomy resection of mass  post op CT stable, neuro checks q4  MRI Head w/wo ordered post op  Continue Decadron, may start po diet, resume PT/ OOB  continue to hold Eliquis / ASA   Venodynes for DVT PPX   No seizure ppx as patient has not experienced any seizures  may down grade to 1N    Discussed with Dr. Novoa & ICU team

## 2022-04-20 NOTE — PHARMACOTHERAPY INTERVENTION NOTE - COMMENTS
Reviewed and verified medication history from Dr. Forrest with patient. States that he currently is not taking any antiplatelets or anticoagulants. He was previously told by his PCP to take aspirin 81 daily but self-discontinued. He was also prescribed Eliquis 5 BID previously for AF but did not take he states. NILESDA reported.
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
## 2022-04-20 NOTE — PHYSICAL THERAPY INITIAL EVALUATION ADULT - ACTIVE RANGE OF MOTION EXAMINATION, REHAB EVAL
old R Rotator Cuff Injury limited R elevation/bilateral upper extremity Active ROM was WFL (within functional limits)/bilateral  lower extremity Active ROM was WFL (within functional limits)

## 2022-04-20 NOTE — PHYSICAL THERAPY INITIAL EVALUATION ADULT - DIAGNOSIS, PT EVAL
POD # 1 S/P R Occipital crani and tumor resection, Visual loss due to Right occipital lesion, H/o CA mets- esophageal carcinoma with mets to the lung s/p sx / chemo / RT

## 2022-04-20 NOTE — PROGRESS NOTE ADULT - ASSESSMENT
IMP:    81 /o male Right handed male PMH paroxysmal atrial flutter on Eliquis / ASA, esophageal carcinoma with mets to the lung s/p sx / chemo / RT, HTN, HLD, diverticulosis, GERD and BPH presents with L sided visual loss found to have R occipital tumor--POD # 1 S/P R occipital Crani and tumor resection    Doing well post op    Suggest:    DC A-line  DC Skaggs  Neuro checks  Hemodynamic monitoring  Pain control  IS  Resume DOAC when OK with NSGY  Brain imaging as per NSGY  DVT prophy--SCD    ICU care-- d/w pt and All concerns addressed including but not limited to diagnosis, treatment plan and overall prognosis

## 2022-04-20 NOTE — PHYSICAL THERAPY INITIAL EVALUATION ADULT - PERTINENT HX OF CURRENT PROBLEM, REHAB EVAL
Pt seen in ICU, 2 family members present, Cranium cover with gauze C/D/i, Denies dizziness, VSS, Pain 3/10 no change reported during session. Hemovac+, Skaggs+, ICU monitors+

## 2022-04-20 NOTE — PHYSICAL THERAPY INITIAL EVALUATION ADULT - PRECAUTIONS/LIMITATIONS, REHAB EVAL
HOB elevated 30 deg, Hemovac drain, High risk for acute decompensation and deterioration including death/fall precautions/surgical precautions HOB elevated 30 deg, Hemovac drain, High risk for acute decompensation and deterioration including death, L eye visual impaired due to brain mets/fall precautions/surgical precautions/vision precautions

## 2022-04-20 NOTE — PHYSICAL THERAPY INITIAL EVALUATION ADULT - DID THE PATIENT HAVE SURGERY?
Metastasis to brain , Craniotomy, using frameless stereotaxy, for tumor 19-Apr-2022 14:28:12  Rene Novoa./yes

## 2022-04-20 NOTE — PHYSICAL THERAPY INITIAL EVALUATION ADULT - ADDITIONAL COMMENTS
Pt reports being very active at home.  L visual field limited at around 45 deg unable to see in the peripheral view after approx 45 deg.

## 2022-04-21 ENCOUNTER — TRANSCRIPTION ENCOUNTER (OUTPATIENT)
Age: 82
End: 2022-04-21

## 2022-04-21 VITALS
SYSTOLIC BLOOD PRESSURE: 140 MMHG | TEMPERATURE: 98 F | HEART RATE: 74 BPM | RESPIRATION RATE: 16 BRPM | OXYGEN SATURATION: 98 % | DIASTOLIC BLOOD PRESSURE: 70 MMHG

## 2022-04-21 RX ORDER — OXYCODONE HYDROCHLORIDE 5 MG/1
1 TABLET ORAL
Qty: 0 | Refills: 0 | DISCHARGE
Start: 2022-04-21

## 2022-04-21 RX ORDER — ACETAMINOPHEN 500 MG
650 TABLET ORAL ONCE
Refills: 0 | Status: COMPLETED | OUTPATIENT
Start: 2022-04-21 | End: 2022-04-21

## 2022-04-21 RX ORDER — MELOXICAM 15 MG/1
1 TABLET ORAL
Qty: 0 | Refills: 0 | DISCHARGE

## 2022-04-21 RX ORDER — DEXAMETHASONE 0.5 MG/5ML
1 ELIXIR ORAL
Qty: 30 | Refills: 0
Start: 2022-04-21 | End: 2022-05-20

## 2022-04-21 RX ORDER — OXYCODONE HYDROCHLORIDE 5 MG/1
1 TABLET ORAL
Qty: 20 | Refills: 0
Start: 2022-04-21 | End: 2022-04-25

## 2022-04-21 RX ORDER — OXYCODONE HYDROCHLORIDE 5 MG/1
1 TABLET ORAL
Qty: 30 | Refills: 0
Start: 2022-04-21

## 2022-04-21 RX ADMIN — Medication 4 MILLIGRAM(S): at 10:24

## 2022-04-21 RX ADMIN — PANTOPRAZOLE SODIUM 40 MILLIGRAM(S): 20 TABLET, DELAYED RELEASE ORAL at 05:04

## 2022-04-21 RX ADMIN — Medication 650 MILLIGRAM(S): at 05:03

## 2022-04-21 RX ADMIN — OXYCODONE HYDROCHLORIDE 5 MILLIGRAM(S): 5 TABLET ORAL at 04:23

## 2022-04-21 RX ADMIN — OXYCODONE HYDROCHLORIDE 5 MILLIGRAM(S): 5 TABLET ORAL at 04:08

## 2022-04-21 RX ADMIN — Medication 650 MILLIGRAM(S): at 10:51

## 2022-04-21 NOTE — DISCHARGE NOTE PROVIDER - NSDCFUADDINST_GEN_ALL_CORE_FT
Drink plenty of fluids to avoid constipation.   Avoid heavy lifting, bending twisting, or straining.   Keep incision clean and dry, may shower, wash with jami shampoo and pat dry.   If you develop redness, swelling, discharge, bleeding, or fever please call the office   If you develop chest pain or shortness of breath please go to the nearest emergency room

## 2022-04-21 NOTE — DISCHARGE NOTE NURSING/CASE MANAGEMENT/SOCIAL WORK - NSDCPEFALRISK_GEN_ALL_CORE
For information on Fall & Injury Prevention, visit: [email or web address] OR  [email or web address] OR  https://www.cdc.gov/steadi/patient.html

## 2022-04-21 NOTE — DISCHARGE NOTE NURSING/CASE MANAGEMENT/SOCIAL WORK - PATIENT PORTAL LINK FT
You can access the FollowMyHealth Patient Portal offered by [facility] by registering at the following website: [email or web address]. By joining [employer]’s FollowMyHealth portal, you will also be able to view your health information using other applications (apps) compatible with our system.

## 2022-04-21 NOTE — PROGRESS NOTE ADULT - REASON FOR ADMISSION
Brain mass with brain compression and vasogenic edema
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
## 2022-04-21 NOTE — PROGRESS NOTE ADULT - PROVIDER SPECIALTY LIST ADULT
Neurosurgery
Hospitalist
Neurology
Neurosurgery
Critical Care
Critical Care

## 2022-04-21 NOTE — DISCHARGE NOTE PROVIDER - CARE PROVIDER_API CALL
Rene Novoa; PhD)  Neurosurgery  [address], [address] [place] [address]  Phone: [phone]  Fax: [phone]  Follow Up Time: 2 weeks   Rene Novoa; PhD)  Neurosurgery  [address], [address] [place] [address]  Phone: [phone]  Fax: [phone]  Follow Up Time: 2 weeks    Anisha Resendiz (MD)  Radiation Oncology  [address], [place] [address]  Phone: [phone]  Fax: [phone]  Follow Up Time:

## 2022-04-21 NOTE — DISCHARGE NOTE PROVIDER - NSDCCPCAREPLAN_GEN_ALL_CORE_FT
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 Novoa in the office in 2 weeks      SECONDARY DISCHARGE DIAGNOSES  Diagnosis: Paroxysmal atrial flutter  Assessment and Plan of Treatment: Continue home medications and follow up with primary care physician    Diagnosis: HTN (hypertension)  Assessment and Plan of Treatment: Continue home medications and follow up with primary care physician    Diagnosis: BPH (benign prostatic hyperplasia)  Assessment and Plan of Treatment: Continue home medications and follow up with primary care physician    Diagnosis: GERD (gastroesophageal reflux disease)  Assessment and Plan of Treatment: Continue home medications and follow up with primary care physician    Diagnosis: HLD (hyperlipidemia)  Assessment and Plan of Treatment: Continue home medications and follow up with primary care physician     PRINCIPAL DISCHARGE DIAGNOSIS  Diagnosis: Brain mass  Assessment and Plan of Treatment: Follow up with Dr. Novoa in the office in 2 weeks. If you need to reschedule or make an appointment, Please call their office.  Your incision will be evaluated at this appointment. Any sutures or staples may be removed.    Please follow up with your Primary Care Physician as it is important to keep them updated on your health. Please call their office to make appointment.  No strenous activity. No heavy lifting. Do not return to work or operate a motor vehicle until cleared by physician. DO NOT start aspirin / NSAIDS (motrin, advil ...) until cleared by your Neurosurgeon.  Patient may shower, wash incision with jami shampoo and pat dry, no specific dressing required   Will need follow up with oncology team, Dr. Resendiz.  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   Take medications as prescribed      SECONDARY DISCHARGE DIAGNOSES  Diagnosis: Paroxysmal atrial flutter  Assessment and Plan of Treatment: Continue home medications and follow up with primary care physician    Diagnosis: HTN (hypertension)  Assessment and Plan of Treatment: Continue home medications and follow up with primary care physician    Diagnosis: BPH (benign prostatic hyperplasia)  Assessment and Plan of Treatment: Continue home medications and follow up with primary care physician    Diagnosis: GERD (gastroesophageal reflux disease)  Assessment and Plan of Treatment: Continue home medications and follow up with primary care physician    Diagnosis: HLD (hyperlipidemia)  Assessment and Plan of Treatment: Continue home medications and follow up with primary care physician

## 2022-04-21 NOTE — DISCHARGE NOTE PROVIDER - PROVIDER TOKENS
PROVIDER:[TOKEN:[[id number]],FOLLOWUP:[2 weeks]] PROVIDER:[TOKEN:[[id number]],FOLLOWUP:[2 weeks]],PROVIDER:[TOKEN:[[id number]]]

## 2022-04-21 NOTE — PROGRESS NOTE ADULT - THIS PATIENT HAS THE FOLLOWING CONDITION(S)/DIAGNOSES ON THIS ADMISSION:
Cerebral Edema
Cerebral Edema
None/Cerebral Edema
Cerebral Edema/Brain Compression / Herniation
None/Cerebral Edema

## 2022-04-21 NOTE — DISCHARGE NOTE PROVIDER - NSDCMRMEDTOKEN_GEN_ALL_CORE_FT
alfuzosin 10 mg oral tablet, extended release: 1 tab(s) orally once a day  dicyclomine 10 mg oral capsule: 1 cap(s) orally 3 times a day, As Needed  hydroCHLOROthiazide 25 mg oral tablet: 1 tab(s) orally once a day  Ocuvite oral tablet: 1 tab(s) orally once a day  omeprazole 20 mg oral delayed release capsule: 1 cap(s) orally once a day  oxyCODONE 5 mg oral tablet: 1 tab(s) orally every 4 hours, As needed, Moderate Pain (4 - 6) MDD:6  simvastatin 20 mg oral tablet: 1 tab(s) orally once a day (at bedtime)  Vitamin B12 1000 mcg oral tablet: 1 tab(s) orally once a day  Vitamin D3 2000 intl units oral tablet: 1 tab(s) orally once a day   alfuzosin 10 mg oral tablet, extended release: 1 tab(s) orally once a day  dexamethasone 2 mg oral tablet: 1 tab(s) orally once a day in AM MDD:1  dicyclomine 10 mg oral capsule: 1 cap(s) orally 3 times a day, As Needed  hydroCHLOROthiazide 25 mg oral tablet: 1 tab(s) orally once a day  Ocuvite oral tablet: 1 tab(s) orally once a day  omeprazole 20 mg oral delayed release capsule: 1 cap(s) orally once a day  oxyCODONE 5 mg oral tablet: 1 tab(s) orally every 6 hours, As Needed -Moderate Pain (4 - 6) MDD:4  simvastatin 20 mg oral tablet: 1 tab(s) orally once a day (at bedtime)  Vitamin B12 1000 mcg oral tablet: 1 tab(s) orally once a day  Vitamin D3 2000 intl units oral tablet: 1 tab(s) orally once a day   alfuzosin 10 mg oral tablet, extended release: 1 tab(s) orally once a day  dexamethasone 2 mg oral tablet: 1 tab(s) orally once a day in AM MDD:1  dicyclomine 10 mg oral capsule: 1 cap(s) orally 3 times a day, As Needed  hydroCHLOROthiazide 25 mg oral tablet: 1 tab(s) orally once a day  Ocuvite oral tablet: 1 tab(s) orally once a day  omeprazole 20 mg oral delayed release capsule: 1 cap(s) orally once a day  oxyCODONE 5 mg oral tablet: 1 tab(s) orally every 6 hours, As Needed -Moderate Pain (4 - 6) MDD:4  oxyCODONE 5 mg oral tablet: 1 tab(s) orally every 4 hours, As needed, Moderate Pain (4 - 6)  simvastatin 20 mg oral tablet: 1 tab(s) orally once a day (at bedtime)  Vitamin B12 1000 mcg oral tablet: 1 tab(s) orally once a day  Vitamin D3 2000 intl units oral tablet: 1 tab(s) orally once a day

## 2022-04-21 NOTE — DISCHARGE NOTE PROVIDER - NSDCCPTREATMENT_GEN_ALL_CORE_FT
PRINCIPAL PROCEDURE  Procedure: Craniotomy, using frameless stereotaxy, for tumor  Findings and Treatment:
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
## 2022-04-21 NOTE — PROGRESS NOTE ADULT - ASSESSMENT
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 CTH in ED demonstrated RT occipital mass with cerebral edema, follow-up MR redemonstrated RT occipital lesion with surrounding vasogenic edema.     PLAN-  POD#2 craniotomy resection of mass  post op CT stable, neuro checks q4  MRI Head done and reviewed with Dr. Novoa  Continue Decadron, discharged on 2mg Daily.   WILL NEED FOLLOW-UP WITH RAD-ONC in 2 weeks  Tolerating diet  PT recc home, patient with DME at home  continue to hold Eliquis / ASA   Venodynes for DVT PPX   No seizure ppx as patient has not experienced any seizures  Patient CLEAR FOR DISHARGE AFTER PT SEES TODAY, needs reccs/training on LT lower quadrant field cut.     Discussed with patient wound care, follow up plans, activity, and medications. Questions answered, and they verbalized understanding.    Discussed with Dr. Novoa

## 2022-04-21 NOTE — DISCHARGE NOTE PROVIDER - NS AS DC PROVIDER CONTACT Y/N MULTI
"Subjective   Liliana Valenzuela is a 36 y.o. female.   Chief Complaint   Patient presents with   • Contraception     pt is wanting to get back on BC and a referral for hernia repair      History of Present Illness   Heavy bleeding - clots pain with menstrual cycle. In a new relationship.   Would like to restart on birthcontrol. Used depo provera injections in the past and it worked well.      She would like to discuss a referral back to see surgeon who did her hernia repair   Umbilical in 2008. She has seen Dr. Bowers has seen her in 2017.   She is considered to be a patient and needs to call for an appointment.   Patient reports she has not been taking any medications in the past year for her diabetes. She has been watching her diet.       The following portions of the patient's history were reviewed and updated as appropriate: allergies, current medications, past family history, past medical history, past social history, past surgical history and problem list.    Review of Systems   Constitutional: Negative.    HENT: Negative.    Eyes: Negative.    Respiratory: Negative.    Cardiovascular: Negative.    Gastrointestinal: Negative.         Hernia    Genitourinary:        Menstrual cycle is very heavy - large clots - painful.   In a new relationship - would like to restart contraception.   Depo provera shots worked well in the past      Musculoskeletal: Negative.    Neurological: Negative.    Hematological: Negative.    Psychiatric/Behavioral: Negative.        Objective   Allergies   Allergen Reactions   • Morphine And Related      Visit Vitals  /78   Pulse 98   Temp 98.2 °F (36.8 °C)   Resp 18   Ht 170.2 cm (67\")   Wt 98.4 kg (217 lb)   LMP 01/12/2019 (Exact Date)   SpO2 99%   BMI 33.99 kg/m²       Physical Exam   Constitutional: She is oriented to person, place, and time. She appears well-developed and well-nourished.   Cardiovascular: Normal rate and regular rhythm.   Pulmonary/Chest: Effort normal and breath " Yes sounds normal.   Abdominal: A hernia is present.   Neurological: She is alert and oriented to person, place, and time.   Skin: Skin is warm, dry and intact. Capillary refill takes less than 2 seconds.   Psychiatric: She has a normal mood and affect. Her behavior is normal.   Vitals reviewed.      Assessment/Plan   Liliana was seen today for contraception.    Diagnoses and all orders for this visit:    Menses painful  -     Ambulatory Referral to Obstetrics / Gynecology  -     POCT pregnancy, urine  -     CBC & Differential  -     Comprehensive Metabolic Panel  -     CBC Auto Differential  -     Scan Slide; Future  -     Cancel: Scan Slide    Family planning, BCP (birth control pills) initial prescription  -     medroxyPROGESTERone (DEPO-PROVERA) 150 MG/ML injection; Inject 1 mL into the appropriate muscle as directed by prescriber Every 3 (Three) Months.  -     medroxyPROGESTERone (DEPO-PROVERA) injection 150 mg; Inject 1 mL into the appropriate muscle as directed by prescriber 1 (One) Time.    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     Follow up with Dr. Buenrostro in 4 weeks for annual exam and for HgbA1c results.     See patient instructions for depo provera injection.            Patient Instructions   Medroxyprogesterone injection [Contraceptive]  What is this medicine?  MEDROXYPROGESTERONE (me DROX ee proe ANNA te estevan) contraceptive injections prevent pregnancy. They provide effective birth control for 3 months. Depo-subQ Provera 104 is also used for treating pain related to endometriosis.  This medicine may be used for other purposes; ask your health care provider or pharmacist if you have questions.  COMMON BRAND NAME(S): Depo-Provera, Depo-subQ Provera 104  What should I tell my health care provider before I take this medicine?  They need to know if  you have any of these conditions:  -frequently drink alcohol  -asthma  -blood vessel disease or a history of a blood clot in the lungs or legs  -bone disease such as osteoporosis  -breast cancer  -diabetes  -eating disorder (anorexia nervosa or bulimia)  -high blood pressure  -HIV infection or AIDS  -kidney disease  -liver disease  -mental depression  -migraine  -seizures (convulsions)  -stroke  -tobacco smoker  -vaginal bleeding  -an unusual or allergic reaction to medroxyprogesterone, other hormones, medicines, foods, dyes, or preservatives  -pregnant or trying to get pregnant  -breast-feeding  How should I use this medicine?  Depo-Provera Contraceptive injection is given into a muscle. Depo-subQ Provera 104 injection is given under the skin. These injections are given by a health care professional. You must not be pregnant before getting an injection. The injection is usually given during the first 5 days after the start of a menstrual period or 6 weeks after delivery of a baby.  Talk to your pediatrician regarding the use of this medicine in children. Special care may be needed. These injections have been used in female children who have started having menstrual periods.  Overdosage: If you think you have taken too much of this medicine contact a poison control center or emergency room at once.  NOTE: This medicine is only for you. Do not share this medicine with others.  What if I miss a dose?  Try not to miss a dose. You must get an injection once every 3 months to maintain birth control. If you cannot keep an appointment, call and reschedule it. If you wait longer than 13 weeks between Depo-Provera contraceptive injections or longer than 14 weeks between Depo-subQ Provera 104 injections, you could get pregnant. Use another method for birth control if you miss your appointment. You may also need a pregnancy test before receiving another injection.  What may interact with this medicine?  Do not take this  medicine with any of the following medications:  -bosentan  This medicine may also interact with the following medications:  -aminoglutethimide  -antibiotics or medicines for infections, especially rifampin, rifabutin, rifapentine, and griseofulvin  -aprepitant  -barbiturate medicines such as phenobarbital or primidone  -bexarotene  -carbamazepine  -medicines for seizures like ethotoin, felbamate, oxcarbazepine, phenytoin, topiramate  -modafinil  -Jett's wort  This list may not describe all possible interactions. Give your health care provider a list of all the medicines, herbs, non-prescription drugs, or dietary supplements you use. Also tell them if you smoke, drink alcohol, or use illegal drugs. Some items may interact with your medicine.  What should I watch for while using this medicine?  This drug does not protect you against HIV infection (AIDS) or other sexually transmitted diseases.  Use of this product may cause you to lose calcium from your bones. Loss of calcium may cause weak bones (osteoporosis). Only use this product for more than 2 years if other forms of birth control are not right for you. The longer you use this product for birth control the more likely you will be at risk for weak bones. Ask your health care professional how you can keep strong bones.  You may have a change in bleeding pattern or irregular periods. Many females stop having periods while taking this drug.  If you have received your injections on time, your chance of being pregnant is very low. If you think you may be pregnant, see your health care professional as soon as possible.  Tell your health care professional if you want to get pregnant within the next year. The effect of this medicine may last a long time after you get your last injection.  What side effects may I notice from receiving this medicine?  Side effects that you should report to your doctor or health care professional as soon as possible:  -allergic  reactions like skin rash, itching or hives, swelling of the face, lips, or tongue  -breast tenderness or discharge  -breathing problems  -changes in vision  -depression  -feeling faint or lightheaded, falls  -fever  -pain in the abdomen, chest, groin, or leg  -problems with balance, talking, walking  -unusually weak or tired  -yellowing of the eyes or skin  Side effects that usually do not require medical attention (report to your doctor or health care professional if they continue or are bothersome):  -acne  -fluid retention and swelling  -headache  -irregular periods, spotting, or absent periods  -temporary pain, itching, or skin reaction at site where injected  -weight gain  This list may not describe all possible side effects. Call your doctor for medical advice about side effects. You may report side effects to FDA at 6-214-FDA-8202.  Where should I keep my medicine?  This does not apply. The injection will be given to you by a health care professional.  NOTE: This sheet is a summary. It may not cover all possible information. If you have questions about this medicine, talk to your doctor, pharmacist, or health care provider.  © 2018 [employer] (2010-01-08 18:37:56)        Zoë Arora, APRN

## 2022-04-21 NOTE — DISCHARGE NOTE PROVIDER - CARE PROVIDERS DIRECT ADDRESSES
,[email or web address]

## 2022-04-24 ENCOUNTER — OUTPATIENT (OUTPATIENT)
Dept: OUTPATIENT SERVICES | Facility: HOSPITAL | Age: 82
LOS: 1 days | Discharge: HOME | End: 2022-04-24

## 2022-04-24 DIAGNOSIS — Z98.1 ARTHRODESIS STATUS: Chronic | ICD-10-CM

## 2022-04-24 DIAGNOSIS — S62.101A FRACTURE OF UNSPECIFIED CARPAL BONE, RIGHT WRIST, INITIAL ENCOUNTER FOR CLOSED FRACTURE: Chronic | ICD-10-CM

## 2022-04-24 DIAGNOSIS — Z87.19 PERSONAL HISTORY OF OTHER DISEASES OF THE DIGESTIVE SYSTEM: Chronic | ICD-10-CM

## 2022-04-24 DIAGNOSIS — Z98.41 CATARACT EXTRACTION STATUS, RIGHT EYE: Chronic | ICD-10-CM

## 2022-04-24 DIAGNOSIS — Z90.89 ACQUIRED ABSENCE OF OTHER ORGANS: Chronic | ICD-10-CM

## 2022-04-24 DIAGNOSIS — Z90.49 ACQUIRED ABSENCE OF OTHER SPECIFIED PARTS OF DIGESTIVE TRACT: Chronic | ICD-10-CM

## 2022-04-24 DIAGNOSIS — C15.9 MALIGNANT NEOPLASM OF ESOPHAGUS, UNSPECIFIED: ICD-10-CM

## 2022-04-24 DIAGNOSIS — Z98.890 OTHER SPECIFIED POSTPROCEDURAL STATES: Chronic | ICD-10-CM

## 2022-04-24 DIAGNOSIS — Z41.9 ENCOUNTER FOR PROCEDURE FOR PURPOSES OTHER THAN REMEDYING HEALTH STATE, UNSPECIFIED: Chronic | ICD-10-CM

## 2022-04-24 DIAGNOSIS — Z85.118 PERSONAL HISTORY OF OTHER MALIGNANT NEOPLASM OF BRONCHUS AND LUNG: Chronic | ICD-10-CM

## 2022-04-27 ENCOUNTER — APPOINTMENT (OUTPATIENT)
Dept: HEMATOLOGY ONCOLOGY | Facility: CLINIC | Age: 82
End: 2022-04-27
Payer: MEDICARE

## 2022-04-27 VITALS
TEMPERATURE: 98.4 F | DIASTOLIC BLOOD PRESSURE: 85 MMHG | OXYGEN SATURATION: 99 % | RESPIRATION RATE: 17 BRPM | WEIGHT: 149 LBS | HEART RATE: 80 BPM | SYSTOLIC BLOOD PRESSURE: 130 MMHG | BODY MASS INDEX: 24.05 KG/M2

## 2022-04-27 DIAGNOSIS — C79.31 SECONDARY MALIGNANT NEOPLASM OF BRAIN: ICD-10-CM

## 2022-04-27 PROCEDURE — 99215 OFFICE O/P EST HI 40 MIN: CPT

## 2022-04-27 NOTE — ASSESSMENT
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 \par S/p neoadjuvant chemoRT ( taxol/ carboplatin), s/p  R0 surgical resection on 11/11/15 for residual T2, N2 disease. \par S/p 2 additional cycles of systemic chemotherapy with Taxol and carboplatin.\par In 2017 developed low volume  multiple pulmonary metastases- s/p wedge resection.  \par S/p " second adjuvant"  Xeloda/ Oxaliplatin- completed in February 2018. 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 Has appointment with radiation oncology  Dr Resendiz.\par Will obtain PET/CT to r/o  extracranial mets.\par Asked pathology to obtain PDL1 and HER2. Will  also get [facility].\par If PET negative - no clear indication to systemic therapy - will discuss on TB.\par \par Return visit after PET.\par \par D/w patient , his wife and daughter. \par \par \par CC: Dr Prateek Lindsey \par \par \par

## 2022-04-27 NOTE — REASON FOR VISIT
[Follow-Up Visit] : a follow-up [Spouse] : spouse [FreeTextEntry2] : hx of esophageal  cancer now with brain met

## 2022-04-27 NOTE — PHYSICAL EXAM
[Restricted in physically strenuous activity but ambulatory and able to carry out work of a light or sedentary nature] : Status 1- Restricted in physically strenuous activity but ambulatory and able to carry out work of a light or sedentary nature, e.g., light house work, office work [Normal] : full range of motion and no deformities appreciated [de-identified] : looks well  [de-identified] : healing craniotomy incision with staples  [de-identified] : no overt focal motor deficits , gait is normal, still has left peripheral visual field deficit

## 2022-04-27 NOTE — HISTORY OF PRESENT ILLNESS
[Disease: _____________________] : Disease: [unfilled] [de-identified] : Presented in 2015 with chest/ epigastric pain.EGD on 7/24/15 showed malignant ulcerated mass in lower third of esophagus. Biopsy c/w infiltrating moderately differentiated adenocarcinoma. EUS :  T3 N2 nonobstructing distal esophageal cancer . PET/CT no evidence of distant mets. S/p preoperative chemoRT with Taxol/ Carboplatin. On 11/11/15 he underwent left  VATS esophagogastrectomy with abdominal lymphadenectomy at [facility].  \par SUrgical pathology showed  residual 2.5 mm single focus  of moderately differentiated  adenocarcinoma invading muscularis propria, three  of 27 LN with metastatic disease.  Received 2 additional cycles of systemic adjuvant chemotherapy with Taxol and carboplatin- completed 2/19/16.\par \par Surveillance scans showed tiny bilateral  pulmonary nodules. Right lung nodules were stable but left lung nodules- enlarges on follow up scans.\par On 8/25/17 he underwent wedge resection of two left lung pulmonary nodules ( Dr Luis Antonio Curtis [facility]) \par \par Pathology showed metastatic adenocarcinoma compatible with esophageal primary: 9 mm and 8 mm tumors with necrosis. 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  \par  \par CT CAP 9/29/21  : YURY\par \par Recently developed left sided visual field loss. \par MRI of brain with sabine 4/15/22 : large mixed cystic solid mass R parietal 4 x 3.5 x 2.8 cm with extensive edema\par CT CAP with contrast 4/15/22 no evidence of metastatic disease\par \par s/p partial resection of R occipital mass 4/19/22 ( Dr Novoa) : metastatic  poorly diff adenocarcinoma with necrosis c/w patient's history of esophageal carcinoma.  [de-identified] : moderately differentiated adenocarcinoma [de-identified] : HER 2 negative [de-identified] : endoscopic staging T3, N2\par surgical staging after neoadjuvant treatment : T2, N2  ( 3/27) [de-identified] : Feels well. No headaches. Vision slightly improved. On Dex 2 mg daily.

## 2022-04-27 NOTE — REVIEW OF SYSTEMS
Patient was referred for genetic counseling by Darius Ochoa MD due to personal and family history of cancer.  I spent 30 minutes with the patient, all of which were spent in counseling.    PERSONAL HISTORY:    Past Medical History:   Diagnosis Date   • Dyslipidemia    • Esophageal reflux    • GERD (gastroesophageal reflux disease)    • H/O esophagogastroduodenoscopy 12/02/2014    chest pain   • Heart disease     SVT & irreg hear beat & 3 heart ablations   • Low blood pressure     myocardia   • Migraines    • BETZY (obstructive sleep apnea) 1/12/2016   • Rosacea    • Rosacea    • Supraventricular tachycardia (CMS/HCC)      Patient Active Problem List   Diagnosis   • Migraines   • Supraventricular tachycardia (CMS/HCC)   • GERD (gastroesophageal reflux disease)   • Dyslipidemia   • Viral keratitis   • Rosacea   • Benign essential HTN   • BETZY (obstructive sleep apnea)   • PAF (paroxysmal atrial fibrillation) (CMS/HCC)   • Positional nystagmus   • Oscillopsia   • Neoplasm of right breast, 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)       FAMILY HISTORY:  Family History   Problem Relation Age of Onset   • Heart disease Father    • High cholesterol Father    • Hypertension Father    • Heart Father 52        \"massive heart attack\" per pt   • Cirrhosis Mother         non-alcoholic   • Arthritis Mother         Rheumatoid   • Thyroid Mother    • Diabetes Brother    • Heart disease Brother    • Obesity Brother    • Substance Abuse Brother    • Heart disease Brother         Pacemaker   • Cancer, Prostate Brother 63        Metastatic   • Stroke Brother 50   • Heart disease Brother    • Hypertension Brother    • Coronary Artery Disease Brother    • Thyroid Sister    • Hypertension Sister    • Heart Sister    • High cholesterol Sister    • Allergies Sister    • Heart Sister         Mitral Valve Prolapse   • Osteoporosis Sister    • Stroke/TIA Sister    • Cancer Maternal Grandmother         Thought to be  pancreatic   • Cancer Paternal Grandmother         unknown primary   • Cancer, Colon Other         Niece Adenocarcinoma of the rectum in 30s   • Cancer, Colon Other         30s paternal first cousin     All family history by patient report unless otherwise noted. Please see scanned pedigree      DISCUSSION WITH THE PATIENT:  We first began by discussing the genetics of cancer and the features of hereditary cancer. The patient was advised that the majority of all cancers are sporadic with only approximately 10% being hereditary. Hereditary breast and ovarian cancer syndrome, the majority of which is caused by a mutation in BRCA1 or 2, was introduced. The patient meets NCCN criteria for consideration of BRCA1/2 genetic testing.      we reviewed that the benefit of identifying a hereditary predisposition is that it allows for more accurate cancer risk assessment and subsequent changes to medical management to prevent or improve outcome.    We then discussed genetic testing. If a mutation is found in the family other family members could then be tested for that specific mutation. The patient was advised of the costs of these tests. We then discussed the possibility of results. If the results were to come back positive, high-risk management options would apply to the patient. A negative result would leave us without a known cause for the cancer in her family. We also discussed the small possibility of an uncertain variant in which the cancer risk is not fully defined and the medical management options have to be based on the personal and family history of cancer.     We also discussed the option of a hereditary cancer panel. We specifically discussed a high/moderate-risk breast panel (8 genes) and a larger panel (46 genes).  Benefit of using a panel includes more information for less cost than testing each individual gene in a step wise fashion. Limitations discussed include increased change of finding a variant of  unknown significance in one or more genes and finding a mutation is a low penetrance gene that does not have published medical management guidelines. The genes included on the high/moderate-risk breast panel all have some NCCN managment guidelines.     We reviewed the autosomal dominant inheritance of this syndrome. If a mutation were to be found in the patient, her children and siblings would have a 50% chance of also having the mutation.    This information is based on current knowledge.  With the rapid pace of medical research, new discoveries may modify our assessment and approach to this family in the future.  Therefore, the patient is encouraged to re-contact me, or another genetics professional, to update changes in the family history or to inquire about advances.        We also reviewed the laws surrounding insurance or employment discrimination. The Genetic Information Nondiscrimination Act (RADHA) prohibits discrimination in health coverage and employment based on genetic information on the federal level; however, this law does not extend to life insurance, disability insurance, and long-term care insurance.     Psychosocial assessment performed and patient is in a good state of mind to receive information.     IMPRESSION:  Patient is a 55-year-old woman with a recent diagnosis of DCIS of the breast. Patient is accompanied to clinic by her , Ricardo and one of her daughters who is a GI nurse.    Patient at this time is unsure what type of surgery she is going to proceed with and genetic test results may impact her decision therefore option of doing a high risk breast panel and then refluxing to the larger common comprehensive Cancer panel discussed.    During our conversation regarding implications for other family members, Patient's  noted to also have a strong family history of cancer and ultimately also proceeded with genetic testing.    Genetic testing not an option to pick in the [employer]  [facility] preauthorization portal.    PLAN:  1. Patient would like to proceed with testing for hereditary breast cancer. A sample was sent to [facility]. Patient would like to proceed with the high-risk breast panel (MARIN, CHEK2, BRCA1, BRCA2, CDH1, PALB2, PTEN, TP53, NBN). If negative will then be reflexed to the 46 gene comprehensive Cancer panel given the other types of cancer in the family.   2. If a mutation is found in a gene, we will meet again to discuss risks and management options. We discussed in general that the specific types of cancer and management options vary from gene to gene. At this time, patient declined specific information on each gene     Thank you for allowing me to be part of your patient's care.     [Patient Intake Form Reviewed] : Patient intake form was reviewed [Negative] : Endocrine [de-identified] : per HPI

## 2022-05-03 ENCOUNTER — APPOINTMENT (OUTPATIENT)
Dept: NEUROSURGERY | Facility: CLINIC | Age: 82
End: 2022-05-03
Payer: MEDICARE

## 2022-05-03 VITALS
HEART RATE: 71 BPM | OXYGEN SATURATION: 98 % | TEMPERATURE: 97.8 F | DIASTOLIC BLOOD PRESSURE: 74 MMHG | HEIGHT: 66 IN | WEIGHT: 149 LBS | SYSTOLIC BLOOD PRESSURE: 115 MMHG | BODY MASS INDEX: 23.95 KG/M2

## 2022-05-03 PROCEDURE — 99024 POSTOP FOLLOW-UP VISIT: CPT
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
## 2022-05-08 NOTE — REASON FOR VISIT
[Spouse] : spouse [FreeTextEntry1] : Post operative follow up. right occipital craniotomy for resection of metastatic esophageal adenocarcinoma - 4/19/2022

## 2022-05-19 ENCOUNTER — APPOINTMENT (OUTPATIENT)
Dept: RADIATION ONCOLOGY | Facility: CLINIC | Age: 82
End: 2022-05-19
Payer: MEDICARE

## 2022-05-19 VITALS
HEART RATE: 72 BPM | DIASTOLIC BLOOD PRESSURE: 64 MMHG | RESPIRATION RATE: 12 BRPM | WEIGHT: 157 LBS | SYSTOLIC BLOOD PRESSURE: 116 MMHG | BODY MASS INDEX: 25.23 KG/M2 | HEIGHT: 66 IN | OXYGEN SATURATION: 98 %

## 2022-05-19 PROCEDURE — 77263 THER RADIOLOGY TX PLNG CPLX: CPT

## 2022-05-19 PROCEDURE — 99204 OFFICE O/P NEW MOD 45 MIN: CPT | Mod: 25

## 2022-05-19 RX ORDER — ASPIRIN ENTERIC COATED TABLETS 81 MG 81 MG/1
81 TABLET, DELAYED RELEASE ORAL DAILY
Refills: 0 | Status: DISCONTINUED | COMMUNITY
Start: 2018-12-21 | End: 2022-05-19

## 2022-05-19 NOTE — PHYSICAL EXAM
[Outer Ear] : the ears and nose were normal in appearance [Both Tympanic Membranes Were Examined] : both tympanic membranes were normal [Normal] : oriented to person, place and time, the affect was normal, the mood was normal and not anxious [de-identified] : left peripheral field loss
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
## 2022-05-19 NOTE — VITALS
[Maximal Pain Intensity: 0/10] : 0/10 [NoTreatment Scheduled] : no treatment scheduled [Date: ____________] : Patient's last distress assessment performed on [unfilled]. [0 - No Distress] : Distress Level: 0 [80: Normal activity with effort; some signs or symptoms of disease.] : 80: Normal activity with effort; some signs or symptoms of disease.

## 2022-06-01 ENCOUNTER — RESULT REVIEW (OUTPATIENT)
Age: 82
End: 2022-06-01

## 2022-06-01 ENCOUNTER — OUTPATIENT (OUTPATIENT)
Dept: OUTPATIENT SERVICES | Facility: HOSPITAL | Age: 82
LOS: 1 days | End: 2022-06-01
Payer: MEDICARE

## 2022-06-01 ENCOUNTER — APPOINTMENT (OUTPATIENT)
Dept: MRI IMAGING | Facility: CLINIC | Age: 82
End: 2022-06-01
Payer: MEDICARE

## 2022-06-01 DIAGNOSIS — C79.31 SECONDARY MALIGNANT NEOPLASM OF BRAIN: ICD-10-CM

## 2022-06-01 DIAGNOSIS — Z90.49 ACQUIRED ABSENCE OF OTHER SPECIFIED PARTS OF DIGESTIVE TRACT: Chronic | ICD-10-CM

## 2022-06-01 DIAGNOSIS — Z41.9 ENCOUNTER FOR PROCEDURE FOR PURPOSES OTHER THAN REMEDYING HEALTH STATE, UNSPECIFIED: Chronic | ICD-10-CM

## 2022-06-01 DIAGNOSIS — Z90.89 ACQUIRED ABSENCE OF OTHER ORGANS: Chronic | ICD-10-CM

## 2022-06-01 DIAGNOSIS — Z87.19 PERSONAL HISTORY OF OTHER DISEASES OF THE DIGESTIVE SYSTEM: Chronic | ICD-10-CM

## 2022-06-01 DIAGNOSIS — Z98.890 OTHER SPECIFIED POSTPROCEDURAL STATES: Chronic | ICD-10-CM

## 2022-06-01 DIAGNOSIS — S62.101A FRACTURE OF UNSPECIFIED CARPAL BONE, RIGHT WRIST, INITIAL ENCOUNTER FOR CLOSED FRACTURE: Chronic | ICD-10-CM

## 2022-06-01 DIAGNOSIS — Z85.118 PERSONAL HISTORY OF OTHER MALIGNANT NEOPLASM OF BRONCHUS AND LUNG: Chronic | ICD-10-CM

## 2022-06-01 DIAGNOSIS — Z98.41 CATARACT EXTRACTION STATUS, RIGHT EYE: Chronic | ICD-10-CM

## 2022-06-01 DIAGNOSIS — Z98.1 ARTHRODESIS STATUS: Chronic | ICD-10-CM

## 2022-06-01 PROCEDURE — 77334 RADIATION TREATMENT AID(S): CPT

## 2022-06-01 PROCEDURE — 70553 MRI BRAIN STEM W/O & W/DYE: CPT

## 2022-06-01 PROCEDURE — 70553 MRI BRAIN STEM W/O & W/DYE: CPT | Mod: 26,MH

## 2022-06-01 PROCEDURE — 77290 THER RAD SIMULAJ FIELD CPLX: CPT

## 2022-06-01 PROCEDURE — A9585: CPT

## 2022-06-07 PROCEDURE — 77295 3-D RADIOTHERAPY PLAN: CPT

## 2022-06-07 PROCEDURE — 77300 RADIATION THERAPY DOSE PLAN: CPT

## 2022-06-07 PROCEDURE — 77334 RADIATION TREATMENT AID(S): CPT

## 2022-06-08 PROCEDURE — 77373 STRTCTC BDY RAD THER TX DLVR: CPT

## 2022-06-10 PROCEDURE — 77373 STRTCTC BDY RAD THER TX DLVR: CPT

## 2022-06-13 PROCEDURE — 77435 SBRT MANAGEMENT: CPT

## 2022-06-13 PROCEDURE — 77373 STRTCTC BDY RAD THER TX DLVR: CPT

## 2022-06-13 PROCEDURE — 77336 RADIATION PHYSICS CONSULT: CPT

## 2022-07-19 ENCOUNTER — APPOINTMENT (OUTPATIENT)
Dept: UROLOGY | Facility: CLINIC | Age: 82
End: 2022-07-19

## 2022-07-19 VITALS
OXYGEN SATURATION: 98 % | DIASTOLIC BLOOD PRESSURE: 72 MMHG | SYSTOLIC BLOOD PRESSURE: 120 MMHG | BODY MASS INDEX: 24.17 KG/M2 | WEIGHT: 154 LBS | HEIGHT: 67 IN | HEART RATE: 60 BPM

## 2022-07-19 PROCEDURE — 99213 OFFICE O/P EST LOW 20 MIN: CPT

## 2022-07-19 NOTE — HISTORY OF PRESENT ILLNESS
[FreeTextEntry1] : 80 year old man seen 01/12/2021 with complaint of gradually increasing PSA and weak stream. This began over the years. Patient reports his PSA was around 1.49 in 2/2014 and now 3.08 in 9/2020 and pt reports it was around 3 on 12/7/20 (trend can be seen in outside medical record). Patient also notes some weak stream over time and reports he is on Alfuzosin 10 mg QHS.  \par It is moderate in severity. Nothing makes the symptoms better, nothing makes sx worse. \par It is associated with nothing.\par No hematuria, no dysuria, no frequency, no urgency, No incontinence. \par No fevers, no chills, no nausea, no vomiting, no flank pain. \par \par No family history contributory to prostate cancer or BPH.\par \par 1/12/21 PVR: 31 mL. \par \par 07/13/2021: Patient presents for follow up. He reports nocturia x1/night continues, not bothersome. No other  complaints. He repeated PSA, which was 3.15 (5/2021).\par \par 07/19/2022: Patient presents for follow up. He denies new  symptoms or complaints. He is s/p craniotomy for metastatic disease.

## 2022-07-19 NOTE — PHYSICAL EXAM
[General Appearance - Well Developed] : well developed [General Appearance - Well Nourished] : well nourished [Normal Appearance] : normal appearance [Well Groomed] : well groomed [General Appearance - In No Acute Distress] : no acute distress [Abdomen Soft] : soft [Abdomen Tenderness] : non-tender [Costovertebral Angle Tenderness] : no ~M costovertebral angle tenderness [Urinary Bladder Findings] : the bladder was normal on palpation [Edema] : no peripheral edema [] : no respiratory distress [Respiration, Rhythm And Depth] : normal respiratory rhythm and effort [Exaggerated Use Of Accessory Muscles For Inspiration] : no accessory muscle use [Oriented To Time, Place, And Person] : oriented to person, place, and time [Affect] : the affect was normal [Mood] : the mood was normal [Not Anxious] : not anxious [Normal Station and Gait] : the gait and station were normal for the patient's age [No Focal Deficits] : no focal deficits [No Palpable Adenopathy] : no palpable adenopathy [Urethral Meatus] : meatus normal [Scrotum] : the scrotum was normal [Testes Mass (___cm)] : there were no testicular masses [No Prostate Nodules] : no prostate nodules [Prostate Size ___ gm] : prostate size [unfilled] gm [FreeTextEntry1] : small testicles

## 2022-07-22 ENCOUNTER — APPOINTMENT (OUTPATIENT)
Dept: RADIATION ONCOLOGY | Facility: CLINIC | Age: 82
End: 2022-07-22

## 2022-07-22 VITALS
RESPIRATION RATE: 18 BRPM | TEMPERATURE: 97 F | WEIGHT: 154 LBS | SYSTOLIC BLOOD PRESSURE: 109 MMHG | OXYGEN SATURATION: 96 % | HEART RATE: 75 BPM | DIASTOLIC BLOOD PRESSURE: 72 MMHG | BODY MASS INDEX: 24.12 KG/M2

## 2022-07-22 PROCEDURE — 99024 POSTOP FOLLOW-UP VISIT: CPT

## 2022-07-22 RX ORDER — ATORVASTATIN CALCIUM 80 MG/1
TABLET, FILM COATED ORAL
Refills: 0 | Status: ACTIVE | COMMUNITY

## 2022-07-22 NOTE — REVIEW OF SYSTEMS
[Dizziness] : dizziness [Negative] : Heme/Lymph [Constipation: Grade 0] : Constipation: Grade 0 [Diarrhea: Grade 0] : Diarrhea: Grade 0 [Dysphagia: Grade 0] : Dysphagia: Grade 0 [Nausea: Grade 0] : Nausea: Grade 0 [Vomiting: Grade 0] : Vomiting: Grade 0 [Cognitive Disturbance: Grade 0] : Cognitive Disturbance: Grade 0 [Concentration Impairment: Grade 0] : Concentration Impairment: Grade 0 [Dizziness: Grade 1 - Mild unsteadiness or sensation of movement] : Dizziness: Grade 1 - Mild unsteadiness or sensation of movement [Headache: Grade 0] : Headache: Grade 0 [Cough: Grade 0] : Cough: Grade 0

## 2022-07-22 NOTE — PHYSICAL EXAM
[Normal] : oriented to person, place and time, the affect was normal, the mood was normal and not anxious [Oriented To Time, Place, And Person] : oriented to person, place, and time [de-identified] : mild ataxia
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
## 2022-07-22 NOTE — VITALS
[Maximal Pain Intensity: 0/10] : 0/10 [Least Pain Intensity: 0/10] : 0/10 [ECOG Performance Status: 0 - Fully active, able to carry on all pre-disease performance without restriction] : Performance Status: 0 - Fully active, able to carry on all pre-disease performance without restriction [70: Cares for self; unalbe to carry on normal activity or do active work.] : 70: Cares for self; unable to carry on normal activity or do active work.

## 2022-08-25 ENCOUNTER — APPOINTMENT (OUTPATIENT)
Dept: MRI IMAGING | Facility: CLINIC | Age: 82
End: 2022-08-25

## 2022-08-25 ENCOUNTER — OUTPATIENT (OUTPATIENT)
Dept: OUTPATIENT SERVICES | Facility: HOSPITAL | Age: 82
LOS: 1 days | End: 2022-08-25
Payer: MEDICARE

## 2022-08-25 DIAGNOSIS — C79.31 SECONDARY MALIGNANT NEOPLASM OF BRAIN: ICD-10-CM

## 2022-08-25 DIAGNOSIS — Z98.890 OTHER SPECIFIED POSTPROCEDURAL STATES: Chronic | ICD-10-CM

## 2022-08-25 DIAGNOSIS — Z87.19 PERSONAL HISTORY OF OTHER DISEASES OF THE DIGESTIVE SYSTEM: Chronic | ICD-10-CM

## 2022-08-25 DIAGNOSIS — Z98.41 CATARACT EXTRACTION STATUS, RIGHT EYE: Chronic | ICD-10-CM

## 2022-08-25 DIAGNOSIS — Z90.49 ACQUIRED ABSENCE OF OTHER SPECIFIED PARTS OF DIGESTIVE TRACT: Chronic | ICD-10-CM

## 2022-08-25 DIAGNOSIS — Z41.9 ENCOUNTER FOR PROCEDURE FOR PURPOSES OTHER THAN REMEDYING HEALTH STATE, UNSPECIFIED: Chronic | ICD-10-CM

## 2022-08-25 DIAGNOSIS — Z85.118 PERSONAL HISTORY OF OTHER MALIGNANT NEOPLASM OF BRONCHUS AND LUNG: Chronic | ICD-10-CM

## 2022-08-25 DIAGNOSIS — Z90.89 ACQUIRED ABSENCE OF OTHER ORGANS: Chronic | ICD-10-CM

## 2022-08-25 DIAGNOSIS — Z98.1 ARTHRODESIS STATUS: Chronic | ICD-10-CM

## 2022-08-25 DIAGNOSIS — S62.101A FRACTURE OF UNSPECIFIED CARPAL BONE, RIGHT WRIST, INITIAL ENCOUNTER FOR CLOSED FRACTURE: Chronic | ICD-10-CM

## 2022-08-25 PROCEDURE — 70553 MRI BRAIN STEM W/O & W/DYE: CPT | Mod: 26,MG

## 2022-08-25 PROCEDURE — A9585: CPT

## 2022-08-25 PROCEDURE — 70553 MRI BRAIN STEM W/O & W/DYE: CPT | Mod: MG

## 2022-08-25 PROCEDURE — G1004: CPT

## 2022-08-30 NOTE — H&P PST ADULT - BACK
Airway  Performed by: Inez Villegas CRNA  Authorized by: Becca Kerr MD     Final Airway Type:  Endotracheal airway  Final Endotracheal Airway*:  ETT  ETT Size (mm)*:  7.0  Cuff*:  Regular  Technique Used for Successful ETT Placement:  Direct laryngoscopy  Devices/Methods Used in Placement*:  Mask  Intubation Procedure*:  Preoxygenation, ETCO2, Atraumatic, Dentition Unchanged and Pharynx Clear  Insertion Site:  Oral  Blade Type*:  MAC  Blade Size*:  3  Secured at (cm)*:  21  Placement Verified by: auscultation and capnometry    Glottic View*:  1 - full view of glottis  Attempts*:  1  Number of Other Approaches Attempted:  0   Patient Identified, Procedure confirmed, Emergency equipment available and Safety protocols followed  Location:  OR  Urgency:  Elective  Difficult Airway: No    Indications for Airway Management:  Anesthesia  Mask Difficulty Assessment:  1 - vent by mask  Performed By:  CRNA  CRNA:  Inez Villegas CRNA  Start Time: 8/30/2022 9:56 AM   Atraumatic. Partial removed prior to OR. Dentition intact as pre-op     detailed exam

## 2022-10-06 NOTE — H&P PST ADULT - HISTORY OF PRESENT ILLNESS
Physical therapy 78 year old male PMH of esophageal cancer s/p esophagectomy s/p chemo and radiation 2015  with mets to left lung s/p resection 2017 s/p chemo 2018, GERD,  HLD, diverticulosis ; Pt had an episode of Atrial flutter early October 2018  on Eliquis; he presents to PST for EPS with ablation with biosense under anesthesia

## 2022-10-07 ENCOUNTER — APPOINTMENT (OUTPATIENT)
Dept: HEMATOLOGY ONCOLOGY | Facility: CLINIC | Age: 82
End: 2022-10-07

## 2022-11-07 ENCOUNTER — EMERGENCY (EMERGENCY)
Facility: HOSPITAL | Age: 82
LOS: 0 days | Discharge: HOME | End: 2022-11-07
Attending: EMERGENCY MEDICINE
Payer: MEDICARE

## 2022-11-07 VITALS
OXYGEN SATURATION: 98 % | DIASTOLIC BLOOD PRESSURE: 67 MMHG | HEART RATE: 80 BPM | SYSTOLIC BLOOD PRESSURE: 131 MMHG | TEMPERATURE: 98 F | RESPIRATION RATE: 18 BRPM

## 2022-11-07 VITALS
DIASTOLIC BLOOD PRESSURE: 97 MMHG | OXYGEN SATURATION: 99 % | HEART RATE: 77 BPM | TEMPERATURE: 98 F | RESPIRATION RATE: 18 BRPM | SYSTOLIC BLOOD PRESSURE: 148 MMHG

## 2022-11-07 DIAGNOSIS — I48.0 PAROXYSMAL ATRIAL FIBRILLATION: ICD-10-CM

## 2022-11-07 DIAGNOSIS — R42 DIZZINESS AND GIDDINESS: ICD-10-CM

## 2022-11-07 DIAGNOSIS — Z85.118 PERSONAL HISTORY OF OTHER MALIGNANT NEOPLASM OF BRONCHUS AND LUNG: ICD-10-CM

## 2022-11-07 DIAGNOSIS — Z98.890 OTHER SPECIFIED POSTPROCEDURAL STATES: Chronic | ICD-10-CM

## 2022-11-07 DIAGNOSIS — R53.1 WEAKNESS: ICD-10-CM

## 2022-11-07 DIAGNOSIS — Z41.9 ENCOUNTER FOR PROCEDURE FOR PURPOSES OTHER THAN REMEDYING HEALTH STATE, UNSPECIFIED: Chronic | ICD-10-CM

## 2022-11-07 DIAGNOSIS — Z85.118 PERSONAL HISTORY OF OTHER MALIGNANT NEOPLASM OF BRONCHUS AND LUNG: Chronic | ICD-10-CM

## 2022-11-07 DIAGNOSIS — E78.5 HYPERLIPIDEMIA, UNSPECIFIED: ICD-10-CM

## 2022-11-07 DIAGNOSIS — Z90.89 ACQUIRED ABSENCE OF OTHER ORGANS: ICD-10-CM

## 2022-11-07 DIAGNOSIS — S62.101A FRACTURE OF UNSPECIFIED CARPAL BONE, RIGHT WRIST, INITIAL ENCOUNTER FOR CLOSED FRACTURE: Chronic | ICD-10-CM

## 2022-11-07 DIAGNOSIS — Z98.41 CATARACT EXTRACTION STATUS, RIGHT EYE: Chronic | ICD-10-CM

## 2022-11-07 DIAGNOSIS — Z90.49 ACQUIRED ABSENCE OF OTHER SPECIFIED PARTS OF DIGESTIVE TRACT: Chronic | ICD-10-CM

## 2022-11-07 DIAGNOSIS — Z92.21 PERSONAL HISTORY OF ANTINEOPLASTIC CHEMOTHERAPY: ICD-10-CM

## 2022-11-07 DIAGNOSIS — Z90.89 ACQUIRED ABSENCE OF OTHER ORGANS: Chronic | ICD-10-CM

## 2022-11-07 DIAGNOSIS — N40.0 BENIGN PROSTATIC HYPERPLASIA WITHOUT LOWER URINARY TRACT SYMPTOMS: ICD-10-CM

## 2022-11-07 DIAGNOSIS — Z98.1 ARTHRODESIS STATUS: Chronic | ICD-10-CM

## 2022-11-07 DIAGNOSIS — K21.9 GASTRO-ESOPHAGEAL REFLUX DISEASE WITHOUT ESOPHAGITIS: ICD-10-CM

## 2022-11-07 DIAGNOSIS — Z85.01 PERSONAL HISTORY OF MALIGNANT NEOPLASM OF ESOPHAGUS: ICD-10-CM

## 2022-11-07 DIAGNOSIS — Z88.8 ALLERGY STATUS TO OTHER DRUGS, MEDICAMENTS AND BIOLOGICAL SUBSTANCES STATUS: ICD-10-CM

## 2022-11-07 DIAGNOSIS — I10 ESSENTIAL (PRIMARY) HYPERTENSION: ICD-10-CM

## 2022-11-07 DIAGNOSIS — Z87.19 PERSONAL HISTORY OF OTHER DISEASES OF THE DIGESTIVE SYSTEM: Chronic | ICD-10-CM

## 2022-11-07 DIAGNOSIS — R00.2 PALPITATIONS: ICD-10-CM

## 2022-11-07 DIAGNOSIS — K57.90 DIVERTICULOSIS OF INTESTINE, PART UNSPECIFIED, WITHOUT PERFORATION OR ABSCESS WITHOUT BLEEDING: ICD-10-CM

## 2022-11-07 LAB
ALBUMIN SERPL ELPH-MCNC: 3.8 G/DL — SIGNIFICANT CHANGE UP (ref 3.3–5)
ALP SERPL-CCNC: 86 U/L — SIGNIFICANT CHANGE UP (ref 40–120)
ALT FLD-CCNC: 30 U/L — SIGNIFICANT CHANGE UP (ref 12–78)
ANION GAP SERPL CALC-SCNC: 6 MMOL/L — SIGNIFICANT CHANGE UP (ref 5–17)
AST SERPL-CCNC: 25 U/L — SIGNIFICANT CHANGE UP (ref 15–37)
BASOPHILS # BLD AUTO: 0.06 K/UL — SIGNIFICANT CHANGE UP (ref 0–0.2)
BASOPHILS NFR BLD AUTO: 0.5 % — SIGNIFICANT CHANGE UP (ref 0–2)
BILIRUB SERPL-MCNC: 0.3 MG/DL — SIGNIFICANT CHANGE UP (ref 0.2–1.2)
BUN SERPL-MCNC: 33 MG/DL — HIGH (ref 7–23)
CALCIUM SERPL-MCNC: 8.9 MG/DL — SIGNIFICANT CHANGE UP (ref 8.5–10.1)
CHLORIDE SERPL-SCNC: 105 MMOL/L — SIGNIFICANT CHANGE UP (ref 96–108)
CO2 SERPL-SCNC: 27 MMOL/L — SIGNIFICANT CHANGE UP (ref 22–31)
CREAT SERPL-MCNC: 1.43 MG/DL — HIGH (ref 0.5–1.3)
EGFR: 49 ML/MIN/1.73M2 — LOW
EOSINOPHIL # BLD AUTO: 0.07 K/UL — SIGNIFICANT CHANGE UP (ref 0–0.5)
EOSINOPHIL NFR BLD AUTO: 0.5 % — SIGNIFICANT CHANGE UP (ref 0–6)
GLUCOSE SERPL-MCNC: 93 MG/DL — SIGNIFICANT CHANGE UP (ref 70–99)
HCT VFR BLD CALC: 36.6 % — LOW (ref 39–50)
HGB BLD-MCNC: 12.4 G/DL — LOW (ref 13–17)
IMM GRANULOCYTES NFR BLD AUTO: 0.5 % — SIGNIFICANT CHANGE UP (ref 0–0.9)
LYMPHOCYTES # BLD AUTO: 0.9 K/UL — LOW (ref 1–3.3)
LYMPHOCYTES # BLD AUTO: 6.9 % — LOW (ref 13–44)
MAGNESIUM SERPL-MCNC: 1.6 MG/DL — SIGNIFICANT CHANGE UP (ref 1.6–2.6)
MCHC RBC-ENTMCNC: 33.4 PG — SIGNIFICANT CHANGE UP (ref 27–34)
MCHC RBC-ENTMCNC: 33.9 GM/DL — SIGNIFICANT CHANGE UP (ref 32–36)
MCV RBC AUTO: 98.7 FL — SIGNIFICANT CHANGE UP (ref 80–100)
MONOCYTES # BLD AUTO: 1.02 K/UL — HIGH (ref 0–0.9)
MONOCYTES NFR BLD AUTO: 7.8 % — SIGNIFICANT CHANGE UP (ref 2–14)
NEUTROPHILS # BLD AUTO: 10.89 K/UL — HIGH (ref 1.8–7.4)
NEUTROPHILS NFR BLD AUTO: 83.8 % — HIGH (ref 43–77)
PLATELET # BLD AUTO: 213 K/UL — SIGNIFICANT CHANGE UP (ref 150–400)
POTASSIUM SERPL-MCNC: 4.6 MMOL/L — SIGNIFICANT CHANGE UP (ref 3.5–5.3)
POTASSIUM SERPL-SCNC: 4.6 MMOL/L — SIGNIFICANT CHANGE UP (ref 3.5–5.3)
PROT SERPL-MCNC: 7.3 GM/DL — SIGNIFICANT CHANGE UP (ref 6–8.3)
RBC # BLD: 3.71 M/UL — LOW (ref 4.2–5.8)
RBC # FLD: 13.1 % — SIGNIFICANT CHANGE UP (ref 10.3–14.5)
SODIUM SERPL-SCNC: 138 MMOL/L — SIGNIFICANT CHANGE UP (ref 135–145)
TROPONIN I, HIGH SENSITIVITY RESULT: 11.28 NG/L — SIGNIFICANT CHANGE UP
WBC # BLD: 13 K/UL — HIGH (ref 3.8–10.5)
WBC # FLD AUTO: 13 K/UL — HIGH (ref 3.8–10.5)

## 2022-11-07 PROCEDURE — 99285 EMERGENCY DEPT VISIT HI MDM: CPT

## 2022-11-07 PROCEDURE — 84484 ASSAY OF TROPONIN QUANT: CPT

## 2022-11-07 PROCEDURE — 36415 COLL VENOUS BLD VENIPUNCTURE: CPT

## 2022-11-07 PROCEDURE — 85025 COMPLETE CBC W/AUTO DIFF WBC: CPT

## 2022-11-07 PROCEDURE — 71045 X-RAY EXAM CHEST 1 VIEW: CPT | Mod: 26

## 2022-11-07 PROCEDURE — 93005 ELECTROCARDIOGRAM TRACING: CPT

## 2022-11-07 PROCEDURE — 80053 COMPREHEN METABOLIC PANEL: CPT

## 2022-11-07 PROCEDURE — 93010 ELECTROCARDIOGRAM REPORT: CPT

## 2022-11-07 PROCEDURE — 99285 EMERGENCY DEPT VISIT HI MDM: CPT | Mod: 25

## 2022-11-07 PROCEDURE — 83735 ASSAY OF MAGNESIUM: CPT

## 2022-11-07 PROCEDURE — 71045 X-RAY EXAM CHEST 1 VIEW: CPT

## 2022-11-07 RX ORDER — ASPIRIN/CALCIUM CARB/MAGNESIUM 324 MG
324 TABLET ORAL ONCE
Refills: 0 | Status: COMPLETED | OUTPATIENT
Start: 2022-11-07 | End: 2022-11-07

## 2022-11-07 RX ORDER — ACETAMINOPHEN 500 MG
1000 TABLET ORAL ONCE
Refills: 0 | Status: COMPLETED | OUTPATIENT
Start: 2022-11-07 | End: 2022-11-07

## 2022-11-07 RX ORDER — SODIUM CHLORIDE 9 MG/ML
1000 INJECTION INTRAMUSCULAR; INTRAVENOUS; SUBCUTANEOUS ONCE
Refills: 0 | Status: COMPLETED | OUTPATIENT
Start: 2022-11-07 | End: 2022-11-07

## 2022-11-07 RX ADMIN — Medication 1000 MILLIGRAM(S): at 23:41

## 2022-11-07 RX ADMIN — Medication 324 MILLIGRAM(S): at 23:43

## 2022-11-07 RX ADMIN — SODIUM CHLORIDE 1000 MILLILITER(S): 9 INJECTION INTRAMUSCULAR; INTRAVENOUS; SUBCUTANEOUS at 22:12

## 2022-11-07 NOTE — ED PROVIDER NOTE - IV ALTEPLASE INCLUSION HIDDEN
S/w pt  She gave me the ok to s/w Agustin Irwin as he lives with her   Advised she update her consent form  Her son wanted clarification because on the pill bottle it say take 1- 5mg daily and on the 3 mg it says take 1 daily  He thought she was take both daily  I advised it was written that way for the pharmacy to fill it but never go by the bill bottle  Always go by  what is verbally said  She takes 5/5/3 alt so she is to stay on that dose as she has for a while now  show

## 2022-11-07 NOTE — ED PROVIDER NOTE - PROGRESS NOTE DETAILS
Pt remains in NSR and feeling well.  discussed Cr with him and urged more fluid intake.  follow up with cardiologist tomorrow (at [facility]) and full str ASA until cardiology can evaluate him.  pt agrees with plan and would like to go home after fluids finish.

## 2022-11-07 NOTE — ED PROVIDER NOTE - PATIENT PORTAL LINK FT
You can access the FollowMyHealth Patient Portal offered by [facility] by registering at the following website: [email or web address]. By joining [facility]’s FollowMyHealth portal, you will also be able to view your health information using other applications (apps) compatible with our system.

## 2022-11-07 NOTE — ED ADULT NURSE NOTE - OBJECTIVE STATEMENT
Pt brought to ED from home with complaints of chest palpitations. Pt received resting comfortably in stretcher. As per Pt, palpitations began at approximately 5 pm and lasted 20 minutes. Pt denies palpitations at this time. Pt has history of atrial flutter, but has since had an ablation to correct. Pt is ambulatory. A&O x4. Airway is patent, breathing is even and unlabored. Pt denies difficulty breathing and shortness of breath. Skin is warm and dry. PMS x4 extremities. Pt denies chest pain. Pt denies abdominal discomfort and n/v/d. Pt denies numbness and tingling in arms and legs. IV placed by EMS prior to arrival, flushes without difficulty. No additional requests or complaints. Patient safety maintained. Will continue to monitor.

## 2022-11-07 NOTE — ED PROVIDER NOTE - OBJECTIVE STATEMENT
83 y/o male w/ hx of atrial flutter 2 years ago presents to ED c/o palpitations. Pt states he's feeling weakness and lightheaded. Not the first time he's experienced this. Pt states he took his blood pressure and heartrate was high in the 140s for 10 minutes. On meloxicam, hydrochlorothiazide., omeprazole, Alfuzosin.  PCP: Dr. Chandra Linker 83 y/o male w/ hx of atrial flutter 2 years ago presents to ED c/o palpitations. Pt states he's feeling weakness and lightheaded. Not the first time he's experienced this. Pt states he took his blood pressure and heartrate was high in the 140s for 10 minutes. On meloxicam, hydrochlorothiazide., omeprazole, Alfuzosin.  Has a h/o aflutter in the past, not on AC.  pt also feels that he may be dehydrated bc he knows "I don't drink enough fluids"  PCP: Dr. Chandra Linker

## 2022-11-07 NOTE — ED PROVIDER NOTE - NSFOLLOWUPINSTRUCTIONS_ED_ALL_ED_FT
Follow up with your cardiologist in the morning.  Plenty of rest  Plenty of fluids.  Please start your day with a glass of water before your coffee.  Please switch out your iced tea for something less dehydrating.  Please take a full strength aspirin daily until you see your cardiologist  Anything worsens or persists, return to ER for further care and evaluation.    A-fib (Atrial Fibrillation)    WHAT YOU NEED TO KNOW:    A-fib may come and go, or it may be a long-term condition. A-fib can cause blood clots, stroke, or heart failure. These conditions may become life-threatening. It is important to treat and manage a-fib to help prevent a blood clot, stroke, or heart failure. Heart Chambers         DISCHARGE INSTRUCTIONS:    Call 911 for any of the following:     You have any of the following signs of a heart attack:   Squeezing, pressure, or pain in your chest       and any of the following:   Discomfort or pain in your back, neck, jaw, stomach, or arm       Shortness of breath      Nausea or vomiting      Lightheadedness or a sudden cold sweat      You have any of the following signs of a stroke:   Numbness or drooping on one side of your face       Weakness in an arm or leg      Confusion or difficulty speaking      Dizziness, a severe headache, or vision loss    Return to the emergency department if: You have any of the following signs of a blood clot:     You feel lightheaded, are short of breath, and have chest pain.      You cough up blood.      You have swelling, redness, pain, or warmth in your arm or leg.    Contact your cardiologist or healthcare provider if:     Your heart rate is more than 110 beats per minute.      You have new or worsening swelling in your legs, feet, ankles, or abdomen.       You are short of breath, even at rest.       You have questions or concerns about your condition or care.     Medicines: You may need any of the following:     Heart medicines help control your heart rate or rhythm. You may need more than one medicine to treat your symptoms.       Blood thinners help prevent blood clots. Examples of blood thinners include heparin and warfarin. Clots can cause strokes, heart attacks, and death. The following are general safety guidelines to follow while you are taking a blood thinner:  Watch for bleeding and bruising while you take blood thinners. Watch for bleeding from your gums or nose. Watch for blood in your urine and bowel movements. Use a soft washcloth on your skin, and a soft toothbrush to brush your teeth. This can keep your skin and gums from bleeding. If you shave, use an electric shaver. Do not play contact sports.       Tell your dentist and other healthcare providers that you take anticoagulants. Wear a bracelet or necklace that says you take this medicine.       Do not start or stop any medicines unless your healthcare provider tells you to. Many medicines cannot be used with blood thinners.       Tell your healthcare provider right away if you forget to take the medicine, or if you take too much.      Warfarin is a blood thinner that you may need to take. The following are things you should be aware of if you take warfarin:   Foods and medicines can affect the amount of warfarin in your blood. Do not make major changes to your diet while you take warfarin. Warfarin works best when you eat about the same amount of vitamin K every day. Vitamin K is found in green leafy vegetables and certain other foods. Ask for more information about what to eat when you are taking warfarin.      You will need to see your healthcare provider for follow-up visits when you are on warfarin. You will need regular blood tests. These tests are used to decide how much medicine you need.       Antiplatelets, such as aspirin, help prevent blood clots. Take your antiplatelet medicine exactly as directed. These medicines make it more likely for you to bleed or bruise. If you are told to take aspirin, do not take acetaminophen or ibuprofen instead.       Take your medicine as directed. Contact your healthcare provider if you think your medicine is not helping or if you have side effects. Tell him or her if you are allergic to any medicine. Keep a list of the medicines, vitamins, and herbs you take. Include the amounts, and when and why you take them. Bring the list or the pill bottles to follow-up visits. Carry your medicine list with you in case of an emergency.    Follow up with your cardiologist as directed: You will need regular blood tests and monitoring. Write down your questions so you remember to ask them during your visits.     Manage A-fib:     Know your target heart rate. Learn how to take your pulse and monitor your heart rate.      Manage other health conditions. This includes high blood pressure, sleep apnea, thyroid disease, diabetes, and other heart conditions. Take medicine as directed and follow your treatment plan.       Limit or do not drink alcohol. Alcohol can make a-fib hard to manage. Ask your healthcare provider if it is safe for you to drink alcohol. A drink of alcohol is 12 ounces of beer, 5 ounces of wine, or 1½ ounces of liquor.       Do not smoke. Nicotine and other chemicals in cigarettes and cigars can cause heart and lung damage. Ask your healthcare provider for information if you currently smoke and need help to quit. E-cigarettes or smokeless tobacco still contain nicotine. Talk to your healthcare provider before you use these products.       Eat heart-healthy foods. Heart healthy foods will help keep your cholesterol low. These include fruits, vegetables, whole-grain breads, low-fat dairy products, beans, lean meats, and fish. Replace butter and margarine with heart-healthy oils such as olive oil and canola oil.       Maintain a healthy weight. Ask your healthcare provider how much you should weigh. Ask him or her to help you create a weight loss plan if you are overweight.       Exercise for 30 minutes most days of the week. Ask your healthcare provider about the best exercise plan for you.

## 2022-11-28 ENCOUNTER — APPOINTMENT (OUTPATIENT)
Dept: MRI IMAGING | Facility: CLINIC | Age: 82
End: 2022-11-28

## 2022-11-28 ENCOUNTER — OUTPATIENT (OUTPATIENT)
Dept: OUTPATIENT SERVICES | Facility: HOSPITAL | Age: 82
LOS: 1 days | End: 2022-11-28
Payer: MEDICARE

## 2022-11-28 DIAGNOSIS — Z85.118 PERSONAL HISTORY OF OTHER MALIGNANT NEOPLASM OF BRONCHUS AND LUNG: Chronic | ICD-10-CM

## 2022-11-28 DIAGNOSIS — S62.101A FRACTURE OF UNSPECIFIED CARPAL BONE, RIGHT WRIST, INITIAL ENCOUNTER FOR CLOSED FRACTURE: Chronic | ICD-10-CM

## 2022-11-28 DIAGNOSIS — C79.31 SECONDARY MALIGNANT NEOPLASM OF BRAIN: ICD-10-CM

## 2022-11-28 DIAGNOSIS — Z98.41 CATARACT EXTRACTION STATUS, RIGHT EYE: Chronic | ICD-10-CM

## 2022-11-28 DIAGNOSIS — Z41.9 ENCOUNTER FOR PROCEDURE FOR PURPOSES OTHER THAN REMEDYING HEALTH STATE, UNSPECIFIED: Chronic | ICD-10-CM

## 2022-11-28 DIAGNOSIS — Z98.1 ARTHRODESIS STATUS: Chronic | ICD-10-CM

## 2022-11-28 DIAGNOSIS — Z87.19 PERSONAL HISTORY OF OTHER DISEASES OF THE DIGESTIVE SYSTEM: Chronic | ICD-10-CM

## 2022-11-28 DIAGNOSIS — Z90.89 ACQUIRED ABSENCE OF OTHER ORGANS: Chronic | ICD-10-CM

## 2022-11-28 DIAGNOSIS — Z98.890 OTHER SPECIFIED POSTPROCEDURAL STATES: Chronic | ICD-10-CM

## 2022-11-28 DIAGNOSIS — Z90.49 ACQUIRED ABSENCE OF OTHER SPECIFIED PARTS OF DIGESTIVE TRACT: Chronic | ICD-10-CM

## 2022-11-28 PROCEDURE — 70553 MRI BRAIN STEM W/O & W/DYE: CPT | Mod: 26,MH

## 2022-11-28 PROCEDURE — A9585: CPT

## 2022-11-28 PROCEDURE — 70553 MRI BRAIN STEM W/O & W/DYE: CPT

## 2022-11-29 NOTE — ED PROVIDER NOTE - MUSCULOSKELETAL, MLM
Spine appears normal, range of motion is not limited, no muscle or joint tenderness V-Y Plasty Text: The defect edges were debeveled with a #15 scalpel blade.  Given the location of the defect, shape of the defect and the proximity to free margins an V-Y advancement flap was deemed most appropriate.  Using a sterile surgical marker, an appropriate advancement flap was drawn incorporating the defect and placing the expected incisions within the relaxed skin tension lines where possible.    The area thus outlined was incised deep to adipose tissue with a #15 scalpel blade.  The skin margins were undermined to an appropriate distance in all directions utilizing iris scissors.

## 2022-12-22 ENCOUNTER — OUTPATIENT (OUTPATIENT)
Dept: OUTPATIENT SERVICES | Facility: HOSPITAL | Age: 82
LOS: 1 days | Discharge: HOME | End: 2022-12-22

## 2022-12-22 DIAGNOSIS — Z90.89 ACQUIRED ABSENCE OF OTHER ORGANS: Chronic | ICD-10-CM

## 2022-12-22 DIAGNOSIS — Z98.41 CATARACT EXTRACTION STATUS, RIGHT EYE: Chronic | ICD-10-CM

## 2022-12-22 DIAGNOSIS — Z98.890 OTHER SPECIFIED POSTPROCEDURAL STATES: Chronic | ICD-10-CM

## 2022-12-22 DIAGNOSIS — Z87.19 PERSONAL HISTORY OF OTHER DISEASES OF THE DIGESTIVE SYSTEM: Chronic | ICD-10-CM

## 2022-12-22 DIAGNOSIS — C15.9 MALIGNANT NEOPLASM OF ESOPHAGUS, UNSPECIFIED: ICD-10-CM

## 2022-12-22 DIAGNOSIS — Z41.9 ENCOUNTER FOR PROCEDURE FOR PURPOSES OTHER THAN REMEDYING HEALTH STATE, UNSPECIFIED: Chronic | ICD-10-CM

## 2022-12-22 DIAGNOSIS — Z90.49 ACQUIRED ABSENCE OF OTHER SPECIFIED PARTS OF DIGESTIVE TRACT: Chronic | ICD-10-CM

## 2022-12-22 DIAGNOSIS — Z98.1 ARTHRODESIS STATUS: Chronic | ICD-10-CM

## 2022-12-22 DIAGNOSIS — S62.101A FRACTURE OF UNSPECIFIED CARPAL BONE, RIGHT WRIST, INITIAL ENCOUNTER FOR CLOSED FRACTURE: Chronic | ICD-10-CM

## 2022-12-22 DIAGNOSIS — Z85.118 PERSONAL HISTORY OF OTHER MALIGNANT NEOPLASM OF BRONCHUS AND LUNG: Chronic | ICD-10-CM

## 2023-01-03 ENCOUNTER — RESULT REVIEW (OUTPATIENT)
Age: 83
End: 2023-01-03

## 2023-01-03 ENCOUNTER — APPOINTMENT (OUTPATIENT)
Dept: HEMATOLOGY ONCOLOGY | Facility: CLINIC | Age: 83
End: 2023-01-03
Payer: MEDICARE

## 2023-01-03 VITALS
OXYGEN SATURATION: 99 % | DIASTOLIC BLOOD PRESSURE: 69 MMHG | HEART RATE: 75 BPM | SYSTOLIC BLOOD PRESSURE: 106 MMHG | HEIGHT: 67 IN | TEMPERATURE: 98.2 F | WEIGHT: 159.5 LBS | RESPIRATION RATE: 18 BRPM | BODY MASS INDEX: 25.03 KG/M2

## 2023-01-03 DIAGNOSIS — C79.31 SECONDARY MALIGNANT NEOPLASM OF BRAIN: ICD-10-CM

## 2023-01-03 PROCEDURE — 99214 OFFICE O/P EST MOD 30 MIN: CPT

## 2023-01-03 RX ORDER — ALPRAZOLAM 0.25 MG/1
0.25 TABLET ORAL
Qty: 10 | Refills: 0 | Status: DISCONTINUED | COMMUNITY
Start: 2022-05-19 | End: 2023-01-03

## 2023-01-03 RX ORDER — DEXAMETHASONE 2 MG/1
2 TABLET ORAL
Qty: 30 | Refills: 1 | Status: DISCONTINUED | COMMUNITY
Start: 2022-05-19 | End: 2023-01-03

## 2023-01-03 RX ORDER — HYDROCHLOROTHIAZIDE 25 MG/1
25 TABLET ORAL DAILY
Refills: 0 | Status: DISCONTINUED | COMMUNITY
Start: 2020-09-23 | End: 2023-01-03

## 2023-01-03 RX ORDER — DEXAMETHASONE 2 MG/1
2 TABLET ORAL TWICE DAILY
Qty: 60 | Refills: 1 | Status: DISCONTINUED | COMMUNITY
Start: 2022-05-26 | End: 2023-01-03

## 2023-01-03 NOTE — ASSESSMENT
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 \par S/p neoadjuvant chemoRT ( taxol/ carboplatin), s/p  R0 surgical resection on 11/11/15 for residual T2, N2 disease. \par S/p 2 additional cycles of systemic chemotherapy with Taxol and carboplatin.\par In 2017 developed low volume  multiple pulmonary metastases- s/p wedge resection.  \par S/p " second adjuvant"  Xeloda/ Oxaliplatin- completed in February 2018. 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

## 2023-01-03 NOTE — PHYSICAL EXAM
[Normal] : full range of motion and no deformities appreciated [Fully active, able to carry on all pre-disease performance without restriction] : Status 0 - Fully active, able to carry on all pre-disease performance without restriction [de-identified] : looks well  [de-identified] : healing craniotomy incision with staples  [de-identified] : no overt focal motor deficits , gait is normal, still has left peripheral visual field deficit

## 2023-01-03 NOTE — HISTORY OF PRESENT ILLNESS
[Disease: _____________________] : Disease: [unfilled] [de-identified] : Presented in 2015 with chest/ epigastric pain.EGD on 7/24/15 showed malignant ulcerated mass in lower third of esophagus. Biopsy c/w infiltrating moderately differentiated adenocarcinoma. EUS :  T3 N2 nonobstructing distal esophageal cancer . PET/CT no evidence of distant mets. S/p preoperative chemoRT with Taxol/ Carboplatin. On 11/11/15 he underwent left  VATS esophagogastrectomy with abdominal lymphadenectomy at [facility].  \par SUrgical pathology showed  residual 2.5 mm single focus  of moderately differentiated  adenocarcinoma invading muscularis propria, three  of 27 LN with metastatic disease.  Received 2 additional cycles of systemic adjuvant chemotherapy with Taxol and carboplatin- completed 2/19/16.\par \par Surveillance scans showed tiny bilateral  pulmonary nodules. Right lung nodules were stable but left lung nodules- enlarges on follow up scans.\par On 8/25/17 he underwent wedge resection of two left lung pulmonary nodules ( Dr Luis Antonio Curtis [facility]) \par \par Pathology showed metastatic adenocarcinoma compatible with esophageal primary: 9 mm and 8 mm tumors with necrosis. 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  \par  \par CT CAP 9/29/21  : YURY\par \par In 2022 developed left sided visual field loss. \par MRI of brain with sabine 4/15/22 : large mixed cystic solid mass R parietal 4 x 3.5 x 2.8 cm with extensive edema\par CT CAP with contrast 4/15/22 no evidence of metastatic disease\par \par s/p partial resection of R occipital mass 4/19/22 ( Dr Novoa) : metastatic  poorly diff adenocarcinoma with necrosis c/w patient's history of esophageal carcinoma. \par F/u MRI  residual enhancing neoplasm noted superiorly.\par SRS to brain 6/13/22 ( 2700 cGy )- Dr Resendiz\par MRI brain Nov 2022- continuous improvement. No new lesions.  [de-identified] : moderately differentiated adenocarcinoma [de-identified] : HER 2 negative [de-identified] : endoscopic staging T3, N2\par surgical staging after neoadjuvant treatment : T2, N2  ( 3/27) [de-identified] : Feels well and has no complaints. eating well, gained some weight .\par EGD Jan 2022 was OK. No neuro sx

## 2023-01-03 NOTE — REVIEW OF SYSTEMS
[Patient Intake Form Reviewed] : Patient intake form was reviewed [Negative] : Endocrine [de-identified] : per HPI

## 2023-03-15 ENCOUNTER — APPOINTMENT (OUTPATIENT)
Dept: MRI IMAGING | Facility: CLINIC | Age: 83
End: 2023-03-15
Payer: MEDICARE

## 2023-03-15 ENCOUNTER — OUTPATIENT (OUTPATIENT)
Dept: OUTPATIENT SERVICES | Facility: HOSPITAL | Age: 83
LOS: 1 days | End: 2023-03-15
Payer: MEDICARE

## 2023-03-15 DIAGNOSIS — Z98.890 OTHER SPECIFIED POSTPROCEDURAL STATES: Chronic | ICD-10-CM

## 2023-03-15 DIAGNOSIS — S62.101A FRACTURE OF UNSPECIFIED CARPAL BONE, RIGHT WRIST, INITIAL ENCOUNTER FOR CLOSED FRACTURE: Chronic | ICD-10-CM

## 2023-03-15 DIAGNOSIS — Z98.1 ARTHRODESIS STATUS: Chronic | ICD-10-CM

## 2023-03-15 DIAGNOSIS — Z85.118 PERSONAL HISTORY OF OTHER MALIGNANT NEOPLASM OF BRONCHUS AND LUNG: Chronic | ICD-10-CM

## 2023-03-15 DIAGNOSIS — Z98.41 CATARACT EXTRACTION STATUS, RIGHT EYE: Chronic | ICD-10-CM

## 2023-03-15 DIAGNOSIS — Z41.9 ENCOUNTER FOR PROCEDURE FOR PURPOSES OTHER THAN REMEDYING HEALTH STATE, UNSPECIFIED: Chronic | ICD-10-CM

## 2023-03-15 DIAGNOSIS — Z87.19 PERSONAL HISTORY OF OTHER DISEASES OF THE DIGESTIVE SYSTEM: Chronic | ICD-10-CM

## 2023-03-15 DIAGNOSIS — C79.31 SECONDARY MALIGNANT NEOPLASM OF BRAIN: ICD-10-CM

## 2023-03-15 DIAGNOSIS — Z90.49 ACQUIRED ABSENCE OF OTHER SPECIFIED PARTS OF DIGESTIVE TRACT: Chronic | ICD-10-CM

## 2023-03-15 DIAGNOSIS — Z90.89 ACQUIRED ABSENCE OF OTHER ORGANS: Chronic | ICD-10-CM

## 2023-03-15 PROCEDURE — A9585: CPT

## 2023-03-15 PROCEDURE — 70553 MRI BRAIN STEM W/O & W/DYE: CPT | Mod: 26,MH

## 2023-03-15 PROCEDURE — 70553 MRI BRAIN STEM W/O & W/DYE: CPT

## 2023-04-04 ENCOUNTER — APPOINTMENT (OUTPATIENT)
Dept: CT IMAGING | Facility: CLINIC | Age: 83
End: 2023-04-04
Payer: MEDICARE

## 2023-04-04 ENCOUNTER — OUTPATIENT (OUTPATIENT)
Dept: OUTPATIENT SERVICES | Facility: HOSPITAL | Age: 83
LOS: 1 days | End: 2023-04-04
Payer: MEDICARE

## 2023-04-04 DIAGNOSIS — Z98.41 CATARACT EXTRACTION STATUS, RIGHT EYE: Chronic | ICD-10-CM

## 2023-04-04 DIAGNOSIS — C15.9 MALIGNANT NEOPLASM OF ESOPHAGUS, UNSPECIFIED: ICD-10-CM

## 2023-04-04 DIAGNOSIS — Z41.9 ENCOUNTER FOR PROCEDURE FOR PURPOSES OTHER THAN REMEDYING HEALTH STATE, UNSPECIFIED: Chronic | ICD-10-CM

## 2023-04-04 DIAGNOSIS — Z90.49 ACQUIRED ABSENCE OF OTHER SPECIFIED PARTS OF DIGESTIVE TRACT: Chronic | ICD-10-CM

## 2023-04-04 DIAGNOSIS — Z90.89 ACQUIRED ABSENCE OF OTHER ORGANS: Chronic | ICD-10-CM

## 2023-04-04 DIAGNOSIS — Z98.890 OTHER SPECIFIED POSTPROCEDURAL STATES: Chronic | ICD-10-CM

## 2023-04-04 DIAGNOSIS — Z85.118 PERSONAL HISTORY OF OTHER MALIGNANT NEOPLASM OF BRONCHUS AND LUNG: Chronic | ICD-10-CM

## 2023-04-04 DIAGNOSIS — Z87.19 PERSONAL HISTORY OF OTHER DISEASES OF THE DIGESTIVE SYSTEM: Chronic | ICD-10-CM

## 2023-04-04 PROCEDURE — 74177 CT ABD & PELVIS W/CONTRAST: CPT | Mod: 26,MH

## 2023-04-04 PROCEDURE — 74177 CT ABD & PELVIS W/CONTRAST: CPT

## 2023-04-04 PROCEDURE — 71260 CT THORAX DX C+: CPT

## 2023-04-04 PROCEDURE — 71260 CT THORAX DX C+: CPT | Mod: 26,MH

## 2023-04-10 ENCOUNTER — OUTPATIENT (OUTPATIENT)
Dept: OUTPATIENT SERVICES | Facility: HOSPITAL | Age: 83
LOS: 1 days | Discharge: HOME | End: 2023-04-10

## 2023-04-10 DIAGNOSIS — Z90.89 ACQUIRED ABSENCE OF OTHER ORGANS: Chronic | ICD-10-CM

## 2023-04-10 DIAGNOSIS — Z98.1 ARTHRODESIS STATUS: Chronic | ICD-10-CM

## 2023-04-10 DIAGNOSIS — Z85.118 PERSONAL HISTORY OF OTHER MALIGNANT NEOPLASM OF BRONCHUS AND LUNG: Chronic | ICD-10-CM

## 2023-04-10 DIAGNOSIS — Z98.890 OTHER SPECIFIED POSTPROCEDURAL STATES: Chronic | ICD-10-CM

## 2023-04-10 DIAGNOSIS — S62.101A FRACTURE OF UNSPECIFIED CARPAL BONE, RIGHT WRIST, INITIAL ENCOUNTER FOR CLOSED FRACTURE: Chronic | ICD-10-CM

## 2023-04-10 DIAGNOSIS — Z98.41 CATARACT EXTRACTION STATUS, RIGHT EYE: Chronic | ICD-10-CM

## 2023-04-10 DIAGNOSIS — C15.9 MALIGNANT NEOPLASM OF ESOPHAGUS, UNSPECIFIED: ICD-10-CM

## 2023-04-10 DIAGNOSIS — Z87.19 PERSONAL HISTORY OF OTHER DISEASES OF THE DIGESTIVE SYSTEM: Chronic | ICD-10-CM

## 2023-04-10 DIAGNOSIS — Z90.49 ACQUIRED ABSENCE OF OTHER SPECIFIED PARTS OF DIGESTIVE TRACT: Chronic | ICD-10-CM

## 2023-04-10 DIAGNOSIS — Z41.9 ENCOUNTER FOR PROCEDURE FOR PURPOSES OTHER THAN REMEDYING HEALTH STATE, UNSPECIFIED: Chronic | ICD-10-CM

## 2023-04-11 ENCOUNTER — RESULT REVIEW (OUTPATIENT)
Age: 83
End: 2023-04-11

## 2023-04-11 ENCOUNTER — APPOINTMENT (OUTPATIENT)
Dept: HEMATOLOGY ONCOLOGY | Facility: CLINIC | Age: 83
End: 2023-04-11
Payer: MEDICARE

## 2023-04-11 VITALS
HEIGHT: 67 IN | TEMPERATURE: 98.5 F | DIASTOLIC BLOOD PRESSURE: 71 MMHG | BODY MASS INDEX: 25.63 KG/M2 | SYSTOLIC BLOOD PRESSURE: 120 MMHG | WEIGHT: 163.31 LBS | OXYGEN SATURATION: 99 % | HEART RATE: 76 BPM | RESPIRATION RATE: 18 BRPM

## 2023-04-11 DIAGNOSIS — C79.31 SECONDARY MALIGNANT NEOPLASM OF BRAIN: ICD-10-CM

## 2023-04-11 LAB
ALBUMIN SERPL ELPH-MCNC: 4.4 G/DL — SIGNIFICANT CHANGE UP (ref 3.3–5)
ALP SERPL-CCNC: 81 U/L — SIGNIFICANT CHANGE UP (ref 40–120)
ALT FLD-CCNC: 19 U/L — SIGNIFICANT CHANGE UP (ref 10–45)
ANION GAP SERPL CALC-SCNC: 12 MMOL/L — SIGNIFICANT CHANGE UP (ref 5–17)
AST SERPL-CCNC: 18 U/L — SIGNIFICANT CHANGE UP (ref 10–40)
BASOPHILS # BLD AUTO: 0.02 K/UL — SIGNIFICANT CHANGE UP (ref 0–0.2)
BASOPHILS NFR BLD AUTO: 0.3 % — SIGNIFICANT CHANGE UP (ref 0–2)
BILIRUB SERPL-MCNC: 0.4 MG/DL — SIGNIFICANT CHANGE UP (ref 0.2–1.2)
BUN SERPL-MCNC: 37 MG/DL — HIGH (ref 7–23)
CALCIUM SERPL-MCNC: 10.6 MG/DL — HIGH (ref 8.4–10.5)
CHLORIDE SERPL-SCNC: 102 MMOL/L — SIGNIFICANT CHANGE UP (ref 96–108)
CO2 SERPL-SCNC: 25 MMOL/L — SIGNIFICANT CHANGE UP (ref 22–31)
CREAT SERPL-MCNC: 1.29 MG/DL — SIGNIFICANT CHANGE UP (ref 0.5–1.3)
EGFR: 55 ML/MIN/1.73M2 — LOW
EOSINOPHIL # BLD AUTO: 0.01 K/UL — SIGNIFICANT CHANGE UP (ref 0–0.5)
EOSINOPHIL NFR BLD AUTO: 0.1 % — SIGNIFICANT CHANGE UP (ref 0–6)
ERYTHROCYTE [SEDIMENTATION RATE] IN BLOOD: 33 MM/HR — HIGH (ref 0–20)
FERRITIN SERPL-MCNC: 63 NG/ML — SIGNIFICANT CHANGE UP (ref 30–400)
FOLATE SERPL-MCNC: >20 NG/ML — SIGNIFICANT CHANGE UP
GLUCOSE SERPL-MCNC: 128 MG/DL — HIGH (ref 70–99)
HCT VFR BLD CALC: 38 % — LOW (ref 39–50)
HGB BLD-MCNC: 13 G/DL — SIGNIFICANT CHANGE UP (ref 13–17)
IMM GRANULOCYTES NFR BLD AUTO: 0.5 % — SIGNIFICANT CHANGE UP (ref 0–0.9)
IRON SATN MFR SERPL: 110 UG/DL — SIGNIFICANT CHANGE UP (ref 45–165)
IRON SATN MFR SERPL: 37 % — SIGNIFICANT CHANGE UP (ref 16–55)
LYMPHOCYTES # BLD AUTO: 0.79 K/UL — LOW (ref 1–3.3)
LYMPHOCYTES # BLD AUTO: 10.3 % — LOW (ref 13–44)
MCHC RBC-ENTMCNC: 32.7 PG — SIGNIFICANT CHANGE UP (ref 27–34)
MCHC RBC-ENTMCNC: 34.2 GM/DL — SIGNIFICANT CHANGE UP (ref 32–36)
MCV RBC AUTO: 95.5 FL — SIGNIFICANT CHANGE UP (ref 80–100)
MONOCYTES # BLD AUTO: 0.61 K/UL — SIGNIFICANT CHANGE UP (ref 0–0.9)
MONOCYTES NFR BLD AUTO: 8 % — SIGNIFICANT CHANGE UP (ref 2–14)
NEUTROPHILS # BLD AUTO: 6.18 K/UL — SIGNIFICANT CHANGE UP (ref 1.8–7.4)
NEUTROPHILS NFR BLD AUTO: 80.8 % — HIGH (ref 43–77)
NRBC # BLD: 0 /100 WBCS — SIGNIFICANT CHANGE UP (ref 0–0)
PLATELET # BLD AUTO: 234 K/UL — SIGNIFICANT CHANGE UP (ref 150–400)
POTASSIUM SERPL-MCNC: 5.1 MMOL/L — SIGNIFICANT CHANGE UP (ref 3.5–5.3)
POTASSIUM SERPL-SCNC: 5.1 MMOL/L — SIGNIFICANT CHANGE UP (ref 3.5–5.3)
PROT SERPL-MCNC: 6.6 G/DL — SIGNIFICANT CHANGE UP (ref 6–8.3)
RBC # BLD: 3.98 M/UL — LOW (ref 4.2–5.8)
RBC # FLD: 13.2 % — SIGNIFICANT CHANGE UP (ref 10.3–14.5)
SODIUM SERPL-SCNC: 138 MMOL/L — SIGNIFICANT CHANGE UP (ref 135–145)
TIBC SERPL-MCNC: 300 UG/DL — SIGNIFICANT CHANGE UP (ref 220–430)
UIBC SERPL-MCNC: 189 UG/DL — SIGNIFICANT CHANGE UP (ref 110–370)
VIT B12 SERPL-MCNC: 474 PG/ML — SIGNIFICANT CHANGE UP (ref 232–1245)
WBC # BLD: 7.65 K/UL — SIGNIFICANT CHANGE UP (ref 3.8–10.5)
WBC # FLD AUTO: 7.65 K/UL — SIGNIFICANT CHANGE UP (ref 3.8–10.5)

## 2023-04-11 PROCEDURE — 99214 OFFICE O/P EST MOD 30 MIN: CPT

## 2023-04-11 NOTE — PHYSICAL EXAM
[Fully active, able to carry on all pre-disease performance without restriction] : Status 0 - Fully active, able to carry on all pre-disease performance without restriction [Normal] : full range of motion and no deformities appreciated [de-identified] : looks well  [de-identified] : no overt focal motor deficits

## 2023-04-11 NOTE — REVIEW OF SYSTEMS
[Patient Intake Form Reviewed] : Patient intake form was reviewed [Negative] : Endocrine [de-identified] : per HPI

## 2023-04-11 NOTE — HISTORY OF PRESENT ILLNESS
[Disease: _____________________] : Disease: [unfilled] [de-identified] : Presented in 2015 with chest/ epigastric pain.EGD on 7/24/15 showed malignant ulcerated mass in lower third of esophagus. Biopsy c/w infiltrating moderately differentiated adenocarcinoma. EUS :  T3 N2 nonobstructing distal esophageal cancer . PET/CT no evidence of distant mets. S/p preoperative chemoRT with Taxol/ Carboplatin. On 11/11/15 he underwent left  VATS esophagogastrectomy with abdominal lymphadenectomy at [facility].  \par SUrgical pathology showed  residual 2.5 mm single focus  of moderately differentiated  adenocarcinoma invading muscularis propria, three  of 27 LN with metastatic disease.  Received 2 additional cycles of systemic adjuvant chemotherapy with Taxol and carboplatin- completed 2/19/16.\par \par Surveillance scans showed tiny bilateral  pulmonary nodules. Right lung nodules were stable but left lung nodules- enlarges on follow up scans.\par On 8/25/17 he underwent wedge resection of two left lung pulmonary nodules ( Dr Luis Antonio Curtis [facility]) \par \par Pathology showed metastatic adenocarcinoma compatible with esophageal primary: 9 mm and 8 mm tumors with necrosis. 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  \par  \par CT CAP 9/29/21  : YURY\par \par In 2022 developed left sided visual field loss. \par MRI of brain with sabine 4/15/22 : large mixed cystic solid mass R parietal 4 x 3.5 x 2.8 cm with extensive edema\par CT CAP with contrast 4/15/22 no evidence of metastatic disease\par \par s/p partial resection of R occipital mass 4/19/22 ( Dr Novoa) : metastatic  poorly diff adenocarcinoma with necrosis c/w patient's history of esophageal carcinoma. \par F/u MRI  residual enhancing neoplasm noted superiorly.\par SRS to brain 6/13/22 ( 2700 cGy )- Dr Resendiz\par MRI brain Nov 2022- continuous improvement. No new lesions. \par \par MR head 3/15/23  postop changes  , increase in peripherally enhancing lesion- treatment related changes versus POD \par CT CAP 4/4/23 YURY  [de-identified] : moderately differentiated adenocarcinoma [de-identified] : HER 2 negative [de-identified] : endoscopic staging T3, N2\par surgical staging after neoadjuvant treatment : T2, N2  ( 3/27) [de-identified] : Feels well No systemic sx. He feels that his vision is back to normal- but had visual fields done recently ( ? Feb ) and told that there are  peripheral deficits on both right  and left.

## 2023-04-11 NOTE — ASSESSMENT
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 \par S/p neoadjuvant chemoRT ( taxol/ carboplatin), s/p  R0 surgical resection on 11/11/15 for residual T2, N2 disease. \par S/p 2 additional cycles of systemic chemotherapy with Taxol and carboplatin.\par In 2017 developed low volume  multiple pulmonary metastases- s/p wedge resection.  \par S/p " second adjuvant"  Xeloda/ Oxaliplatin- completed in February 2018. 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 Continue  monitoring with scans every 6-12 months.\par \par Recent MRI showed increase in occipital lesion- treatment related changes versus true progression. No new symptoms.\par Will obtain short interval follow up brain MRI  ( early MAy 2023) and refer to neurooncology Dr Rollins.\par \par \par Return visit in 6 months/  sooner PRN.\par \par \par CC: Dr Lindsey

## 2023-05-16 ENCOUNTER — OUTPATIENT (OUTPATIENT)
Dept: OUTPATIENT SERVICES | Facility: HOSPITAL | Age: 83
LOS: 1 days | End: 2023-05-16
Payer: MEDICARE

## 2023-05-16 ENCOUNTER — APPOINTMENT (OUTPATIENT)
Dept: MRI IMAGING | Facility: CLINIC | Age: 83
End: 2023-05-16
Payer: MEDICARE

## 2023-05-16 DIAGNOSIS — Z87.19 PERSONAL HISTORY OF OTHER DISEASES OF THE DIGESTIVE SYSTEM: Chronic | ICD-10-CM

## 2023-05-16 DIAGNOSIS — C79.31 SECONDARY MALIGNANT NEOPLASM OF BRAIN: ICD-10-CM

## 2023-05-16 DIAGNOSIS — Z85.118 PERSONAL HISTORY OF OTHER MALIGNANT NEOPLASM OF BRONCHUS AND LUNG: Chronic | ICD-10-CM

## 2023-05-16 DIAGNOSIS — Z98.890 OTHER SPECIFIED POSTPROCEDURAL STATES: Chronic | ICD-10-CM

## 2023-05-16 DIAGNOSIS — Z98.41 CATARACT EXTRACTION STATUS, RIGHT EYE: Chronic | ICD-10-CM

## 2023-05-16 DIAGNOSIS — Z41.9 ENCOUNTER FOR PROCEDURE FOR PURPOSES OTHER THAN REMEDYING HEALTH STATE, UNSPECIFIED: Chronic | ICD-10-CM

## 2023-05-16 DIAGNOSIS — S62.101A FRACTURE OF UNSPECIFIED CARPAL BONE, RIGHT WRIST, INITIAL ENCOUNTER FOR CLOSED FRACTURE: Chronic | ICD-10-CM

## 2023-05-16 DIAGNOSIS — Z90.89 ACQUIRED ABSENCE OF OTHER ORGANS: Chronic | ICD-10-CM

## 2023-05-16 DIAGNOSIS — Z98.1 ARTHRODESIS STATUS: Chronic | ICD-10-CM

## 2023-05-16 DIAGNOSIS — Z90.49 ACQUIRED ABSENCE OF OTHER SPECIFIED PARTS OF DIGESTIVE TRACT: Chronic | ICD-10-CM

## 2023-05-16 PROCEDURE — A9585: CPT

## 2023-05-16 PROCEDURE — 70553 MRI BRAIN STEM W/O & W/DYE: CPT

## 2023-05-16 PROCEDURE — 70553 MRI BRAIN STEM W/O & W/DYE: CPT | Mod: 26,MH

## 2023-05-17 ENCOUNTER — APPOINTMENT (OUTPATIENT)
Dept: NEUROLOGY | Facility: CLINIC | Age: 83
End: 2023-05-17
Payer: MEDICARE

## 2023-05-17 ENCOUNTER — NON-APPOINTMENT (OUTPATIENT)
Age: 83
End: 2023-05-17

## 2023-05-17 VITALS
SYSTOLIC BLOOD PRESSURE: 121 MMHG | TEMPERATURE: 98.2 F | WEIGHT: 157 LBS | BODY MASS INDEX: 24.64 KG/M2 | HEART RATE: 76 BPM | OXYGEN SATURATION: 96 % | DIASTOLIC BLOOD PRESSURE: 73 MMHG | HEIGHT: 67 IN | RESPIRATION RATE: 16 BRPM

## 2023-05-17 PROCEDURE — 99215 OFFICE O/P EST HI 40 MIN: CPT

## 2023-05-18 NOTE — DISCUSSION/SUMMARY
[FreeTextEntry1] : 82 year old male with metastatic brain esophageal cancer s/p craniotomy and SRS now with progressive enhancement suggestive of RTN.  Discussed role of VADIM with him and with Dr. Vences.  \par \par RADIATION NECROSIS:\par Suggest VADIM 7.5 mg/kg q3w x 4 doses.\par CT head no contrast before VADIM infusion to ensure no hemorrhage present\par MRI head after VADIM treatments completed. \par Instructed to call office for any worsening symptoms.\par Hold on steroids or seizure meds\par \par rtc 6 weeks.

## 2023-05-18 NOTE — DATA REVIEWED
[de-identified] : Serial MRI personally reviewed including 5/23, 3/23, 11/22, 8/22, 6/22, and 4/22, there is serial increase over last few scans in right occipital enhancement and edema, with cold perfusion study \par \par reviewed with neuroradiology by phone today
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
## 2023-05-18 NOTE — PHYSICAL EXAM
[FreeTextEntry1] : Exam as below (with documented exceptions in parentheses) \par \par General: Healthy appearing male, well groomed and without deformities. KPS is 70.\par \par Mental status: Alert, awake and attentive. Oriented to person, place and time. Recent and remote memory intact. Normal concentration. Fluent spontaneous speech with intact naming and repetition. Normal fund of knowledge. \par \par Cranial Nerves:  II: Full visual fields. III, IV, VI: Pupils round, reactive to light. Full extraocular movements. No nystagmus. V: Normal bilateral bite, facial sensation. VII: No facial weakness. VII: Hearing intact. IX, X: Palate midline, intact gag. XI: Sternocleidomastoids normal. XII: Tongue protrudes midline. No dysarthria. (left inferior quadrantanopia).\par \par Motor: Normal tone, bulk and power throughout including arms and legs, proximal and distal. No pronator drift. No abnormal movements. \par \par Sensation: Normal in arms, legs and trunk to pin, proprioception and vibration. Negative Romberg. \par \par Coordination: No dysmetria or dysdiadochokinesis bilaterally. Normal heel-shin testing.  \par \par Reflexes: Normal and symmetric throughout. Absent Babinski bilaterally. \par \par Gait: normal station, able to toe/heel and tandem\par \par Vascular: No peripheral edema/calf tenderness. \par \par Eyes: Fundoscopic exam without papilledema (deferred) \par \par Heart: Regular rate and rhythm. Normal s1-s2. No murmurs, gallops, or rubs.  \par \par Respiratory: Lungs clear to auscultation bilaterally. \par \par Abdomen: Nontender, non-distended. No hepatosplenomegaly. Normal bowel sounds in four quadrants.  \par \par

## 2023-06-01 ENCOUNTER — OUTPATIENT (OUTPATIENT)
Dept: OUTPATIENT SERVICES | Facility: HOSPITAL | Age: 83
LOS: 1 days | End: 2023-06-01
Payer: MEDICARE

## 2023-06-01 ENCOUNTER — APPOINTMENT (OUTPATIENT)
Dept: CT IMAGING | Facility: CLINIC | Age: 83
End: 2023-06-01
Payer: MEDICARE

## 2023-06-01 DIAGNOSIS — Z41.9 ENCOUNTER FOR PROCEDURE FOR PURPOSES OTHER THAN REMEDYING HEALTH STATE, UNSPECIFIED: Chronic | ICD-10-CM

## 2023-06-01 DIAGNOSIS — Z98.890 OTHER SPECIFIED POSTPROCEDURAL STATES: Chronic | ICD-10-CM

## 2023-06-01 DIAGNOSIS — C79.31 SECONDARY MALIGNANT NEOPLASM OF BRAIN: ICD-10-CM

## 2023-06-01 DIAGNOSIS — Z98.41 CATARACT EXTRACTION STATUS, RIGHT EYE: Chronic | ICD-10-CM

## 2023-06-01 DIAGNOSIS — S62.101A FRACTURE OF UNSPECIFIED CARPAL BONE, RIGHT WRIST, INITIAL ENCOUNTER FOR CLOSED FRACTURE: Chronic | ICD-10-CM

## 2023-06-01 DIAGNOSIS — Z85.118 PERSONAL HISTORY OF OTHER MALIGNANT NEOPLASM OF BRONCHUS AND LUNG: Chronic | ICD-10-CM

## 2023-06-01 DIAGNOSIS — Z87.19 PERSONAL HISTORY OF OTHER DISEASES OF THE DIGESTIVE SYSTEM: Chronic | ICD-10-CM

## 2023-06-01 DIAGNOSIS — Z90.89 ACQUIRED ABSENCE OF OTHER ORGANS: Chronic | ICD-10-CM

## 2023-06-01 PROCEDURE — 70450 CT HEAD/BRAIN W/O DYE: CPT

## 2023-06-01 PROCEDURE — 70450 CT HEAD/BRAIN W/O DYE: CPT | Mod: 26,MH

## 2023-06-06 PROBLEM — M23.92 INTERNAL DERANGEMENT OF KNEE, LEFT: Status: RESOLVED | Noted: 2019-09-25 | Resolved: 2023-06-06

## 2023-06-06 NOTE — PHYSICAL EXAM
[Normal] : affect appropriate [de-identified] : anicteric [de-identified] : no lymphadenopathy [de-identified] : no c/c/e, right port without s/s infection [de-identified] : S1S2 RRR, no obvious murmurs [de-identified] : no rashes

## 2023-06-06 NOTE — RESULTS/DATA
[FreeTextEntry1] : WBC: 5.0   Hgb: 13.2   Hct: 37.7  MCV: 112   Plts: 145\par CMP: pending\par \par CT scan chest/abd/[pelvis 2/26/18 - interval EUGENIA wedge resection, else no recurrent/metastatic disease

## 2023-06-06 NOTE — HISTORY OF PRESENT ILLNESS
[Disease: _____________________] : Disease: [unfilled] [de-identified] : YI PETERSON is an 82 y.o. who we are following for a recurrent metastatic esophageal cancer (lungs).\par \par He initially presented with chest/epigastric pain.\par 7/24/15 - EGD([facility]) showed ~4cm malignant ulcerated mass in the lower 1/3 of the esophagus.  A biopsy was c/w a mod diff adenocarcinoma. A PET/CT  showed no distant mets. \par 8/6/15 - [facility] - T3N2 = IIIB nonobstructing distal esophageal cancer\par 8/2/15 - 10/2/15 - NEOadjuvant XRT with weekly Taxol/Carbo\par 11/2/15 - PET/CT - posterior aortic LN no longer hypermetabolic, decreased in size.  GE mass decreased in size and metabolism. \par \par 11/11/15 - Left VATS, minimally invasive transhilar esophagectomy with  abdominal lymphadenectomy - path with a 2.5 residual focus of a mod diff adeno, 3/27 LN's pos = T2N2.  HER2 negative\par 1/8/16 - 2/19/16 - Adjuvant weekly Taxol/Carbo x 2 cycles\par \par 10/27/16 - PET/CT with ~2mm new tiny lingular nodule\par 8/15/17 - CT Chest  - Lingular nodule now ~ 7mm (was 4mm in Feb and 6mm in June). Also 5mm EUGENIA nodule (was 2mm in Feb and 4mm in June)\par 8/25/17 - Wedge resection of 2 left lung nodules ([facility] - [facility]) - 8mm and 9mm nodules were metastatic adenocarcinoma c/w esophageal primary. \par Patient has small right sided pulmonary nodules that have been stable since 2011. He theoretically has stage IV disease that was resected.\par \par 9/25/17 - 2/2018 -  "adjuvant" therapy with mXELOX.  Oxaliplatin was stopped early December (after 5 cycles) due to neuropathy.\par 2/26/18 - CT C/A/P - YURY.  \par Did well until...\par \par 2022 - developed left sided visual field loss. \par 4/15/22 - MRI brain /sabine - 4 x 3.5 x 2.8cm mixed solid cystic right occipital mass with extensive edema.  \par 4/15/22 - CT C/A/P - no evidence of metastatic disease. \par 4/19/23 - Partial resection of right occipital mass (Dr. Novoa) - metastatic poorly diff adenocarcinoma c/w esophageal primary, HER2 neg.\par f/u MRI with residual neoplasm superiorly. \par 6/13/22 - SRS to brain (Dr. Resendiz). \par 11/2022 - MRI Brain - continuous improvement - no new lesions. \par \par 3/15/23 - MRI head - postop changes, increase in peripherally enhancing lesion - treatment related changes vs. POD. \par 5/16/23 - MRI Head - Thickened irregular enhancing lesion within the right posterior parietal/occipital region is reidentified extending to and involving the ependyma of the right ventricular atrium. The overall extent of enhancement is increased in size since prior exam measuring approximately 3.5 cm AP by 2.7 TR, previously measuring 2.9 x 1.9 cm. The majority of the lesion is cold on cerebral blood volume and flow maps, suggestive of predominant radiation necrosis. There is a questionable small focus of increased volume and flow within the inferior portion of the lesion along the ependyma, that may reflect a very small area of active lesion\par 5/17/23 - Saw neurology (Dr. Rollins) - felt c/w RT necrosis.  Recommended VADIM 7.5mg/kg Q21 x 4 doses. \par 6/1/23 - CT head no contrast prior to make sure no hem -  no acute hem. Extensive vasogenic edema within the right posterior frontoparietal temporal lobes and occipital lobe is reidentified. [de-identified] : moderately differentiated adenocarcinoma [de-identified] : The patient has been on Xeloda until ~ 2 weeks ago - he was developing numbness/tingling in his whole hand that was very bothersome.  Xeloda was stopped.  He did start alpha lipoic acid.  He has some neuropathies in his feet as well but not as bad.   He denies any other changes in his family, medical, or social history since his last visit of 1/2/18.

## 2023-06-06 NOTE — REVIEW OF SYSTEMS
[Patient Intake Form Reviewed] : Patient intake form was reviewed [Negative] : Allergic/Immunologic [de-identified] : numbness/tingling fingers/toes as in interval history

## 2023-06-06 NOTE — REASON FOR VISIT
[Follow-Up Visit] : a follow-up [Spouse] : spouse [FreeTextEntry2] : resected stage IV esophageal cancer

## 2023-06-06 NOTE — ASSESSMENT
[FreeTextEntry1] : The patient is an 82 y.o. with a hx of a stage IIIB esophageal cancer with hx lung mets -> resected, brain mets -> resected, now radiation induced necrosis. \par \par s/p JAKE adjuvant XRT with weekly Taxol/Carbo followed by surgery 11/2015 for T2N2 disease, \par then Taxol/Carbo consolidation x 2 cycles. \par Soon after had lung mets -  had wedge resection of 2 left lung nodules 8/2017, \par followed by "adjuvant" chemotherapy with mXELOX"  \par 4/2022 - brain met - s/p partial resection followed by SRS completed 6/13/22. \par MRI now with increase in size of a peripherally enhancing lesion, characteristics c/w radiation necrosis. \par \par Plan: \par Eemlia 7.5mg/kg Q21 x 4\par CT head 6/1/23 - no acute hemorrhage. Extensive vasogenic edema within the right posterior frontoparietal temporal lobes and occipital lobe is reidentified.\par Repeat MRI Head after Emelia completed. \par \par \par \par MANNY OLIVEIRA \par Anisha Resendiz (Rad/Onc)\par Cornel Rollins (Neuro)/ Sommer (Neurosurgery)\par Manny David (GI)\par Dajuan Davalos (Urology)

## 2023-06-07 ENCOUNTER — APPOINTMENT (OUTPATIENT)
Dept: INFUSION THERAPY | Facility: CLINIC | Age: 83
End: 2023-06-07

## 2023-06-07 ENCOUNTER — RESULT REVIEW (OUTPATIENT)
Age: 83
End: 2023-06-07

## 2023-06-07 ENCOUNTER — RESULT CHARGE (OUTPATIENT)
Age: 83
End: 2023-06-07

## 2023-06-07 ENCOUNTER — APPOINTMENT (OUTPATIENT)
Dept: HEMATOLOGY ONCOLOGY | Facility: CLINIC | Age: 83
End: 2023-06-07

## 2023-06-07 ENCOUNTER — NON-APPOINTMENT (OUTPATIENT)
Age: 83
End: 2023-06-07

## 2023-06-07 DIAGNOSIS — M23.92 UNSPECIFIED INTERNAL DERANGEMENT OF LEFT KNEE: ICD-10-CM

## 2023-06-07 LAB
CREAT ?TM UR-MCNC: 149 MG/DL — SIGNIFICANT CHANGE UP
PROT ?TM UR-MCNC: 10 MG/DL — SIGNIFICANT CHANGE UP (ref 0–12)
PROT/CREAT UR-RTO: 0.1 RATIO — SIGNIFICANT CHANGE UP (ref 0–0.2)

## 2023-06-08 DIAGNOSIS — Z51.11 ENCOUNTER FOR ANTINEOPLASTIC CHEMOTHERAPY: ICD-10-CM

## 2023-06-20 ENCOUNTER — OUTPATIENT (OUTPATIENT)
Dept: OUTPATIENT SERVICES | Facility: HOSPITAL | Age: 83
LOS: 1 days | Discharge: HOME | End: 2023-06-20

## 2023-06-20 DIAGNOSIS — Z98.890 OTHER SPECIFIED POSTPROCEDURAL STATES: Chronic | ICD-10-CM

## 2023-06-20 DIAGNOSIS — Z87.19 PERSONAL HISTORY OF OTHER DISEASES OF THE DIGESTIVE SYSTEM: Chronic | ICD-10-CM

## 2023-06-20 DIAGNOSIS — Z41.9 ENCOUNTER FOR PROCEDURE FOR PURPOSES OTHER THAN REMEDYING HEALTH STATE, UNSPECIFIED: Chronic | ICD-10-CM

## 2023-06-20 DIAGNOSIS — Z98.41 CATARACT EXTRACTION STATUS, RIGHT EYE: Chronic | ICD-10-CM

## 2023-06-20 DIAGNOSIS — Z98.1 ARTHRODESIS STATUS: Chronic | ICD-10-CM

## 2023-06-20 DIAGNOSIS — Z85.118 PERSONAL HISTORY OF OTHER MALIGNANT NEOPLASM OF BRONCHUS AND LUNG: Chronic | ICD-10-CM

## 2023-06-20 DIAGNOSIS — S62.101A FRACTURE OF UNSPECIFIED CARPAL BONE, RIGHT WRIST, INITIAL ENCOUNTER FOR CLOSED FRACTURE: Chronic | ICD-10-CM

## 2023-06-20 DIAGNOSIS — Z90.89 ACQUIRED ABSENCE OF OTHER ORGANS: Chronic | ICD-10-CM

## 2023-06-20 DIAGNOSIS — Z90.49 ACQUIRED ABSENCE OF OTHER SPECIFIED PARTS OF DIGESTIVE TRACT: Chronic | ICD-10-CM

## 2023-06-20 DIAGNOSIS — C15.9 MALIGNANT NEOPLASM OF ESOPHAGUS, UNSPECIFIED: ICD-10-CM

## 2023-06-28 ENCOUNTER — APPOINTMENT (OUTPATIENT)
Dept: INFUSION THERAPY | Facility: CLINIC | Age: 83
End: 2023-06-28
Payer: MEDICARE

## 2023-06-28 ENCOUNTER — APPOINTMENT (OUTPATIENT)
Dept: NEUROLOGY | Facility: CLINIC | Age: 83
End: 2023-06-28
Payer: MEDICARE

## 2023-06-28 ENCOUNTER — APPOINTMENT (OUTPATIENT)
Dept: HEMATOLOGY ONCOLOGY | Facility: CLINIC | Age: 83
End: 2023-06-28
Payer: MEDICARE

## 2023-06-28 VITALS
DIASTOLIC BLOOD PRESSURE: 68 MMHG | BODY MASS INDEX: 25.94 KG/M2 | HEART RATE: 84 BPM | OXYGEN SATURATION: 97 % | WEIGHT: 165.25 LBS | RESPIRATION RATE: 18 BRPM | SYSTOLIC BLOOD PRESSURE: 111 MMHG | HEIGHT: 67 IN | TEMPERATURE: 97.4 F

## 2023-06-28 PROCEDURE — 99214 OFFICE O/P EST MOD 30 MIN: CPT

## 2023-06-28 PROCEDURE — 99215 OFFICE O/P EST HI 40 MIN: CPT
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
## 2023-06-28 NOTE — ASSESSMENT
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 \par S/p neoadjuvant chemoRT ( taxol/ carboplatin), s/p  R0 surgical resection on 11/11/15 for residual T2, N2 disease. \par S/p 2 additional cycles of systemic chemotherapy with Taxol and carboplatin.\par In 2017 developed low volume  multiple pulmonary metastases- s/p wedge resection.  \par S/p " second adjuvant"  Xeloda/ Oxaliplatin- completed in February 2018. Remained YURY for five years\par \par April 2022 diagnosed with large solitary metastasis to right occipital lobe s/p surgical resection.\par Unusual course of his disease with CNS relapse seven years after initial  diagnosis.\par S/p STS to site of brain met.\par \par MRI of brain March 2023 showed enlargement of occipital lesion c/w radiation necrosis. Evaluated by Dr Rollins and bevacizumab recommended.\par Started helga  6/7/23 \par Plan for 4 cycles of helga followed by MRI of brain - planned for end of August.\par \par Chronic low back pain. Elective surgery was planned for August-  needs to be postponed  at least 6-8 weeks after last helga .\par \par Exam in 4 weeks \par \par CC: Dr Lindsey

## 2023-06-28 NOTE — DATA REVIEWED
[de-identified] : Serial MRI personally reviewed including 5/23, 3/23, 11/22, 8/22, 6/22, and 4/22, there is serial increase over last few scans in right occipital enhancement and edema, with cold perfusion study \par \par reviewed with neuroradiology by phone today

## 2023-06-28 NOTE — PHYSICAL EXAM
[Restricted in physically strenuous activity but ambulatory and able to carry out work of a light or sedentary nature] : Status 1- Restricted in physically strenuous activity but ambulatory and able to carry out work of a light or sedentary nature, e.g., light house work, office work [Normal] : affect appropriate [de-identified] : looks great

## 2023-06-28 NOTE — HISTORY OF PRESENT ILLNESS
[Disease: _____________________] : Disease: [unfilled] [de-identified] : Presented in 2015 with chest/ epigastric pain.EGD on 7/24/15 showed malignant ulcerated mass in lower third of esophagus. Biopsy c/w infiltrating moderately differentiated adenocarcinoma. EUS :  T3 N2 nonobstructing distal esophageal cancer . PET/CT no evidence of distant mets. S/p preoperative chemoRT with Taxol/ Carboplatin. On 11/11/15 he underwent left  VATS esophagogastrectomy with abdominal lymphadenectomy at [facility].  \par SUrgical pathology showed  residual 2.5 mm single focus  of moderately differentiated  adenocarcinoma invading muscularis propria, three  of 27 LN with metastatic disease.  Received 2 additional cycles of systemic adjuvant chemotherapy with Taxol and carboplatin- completed 2/19/16.\par \par Surveillance scans showed tiny bilateral  pulmonary nodules. Right lung nodules were stable but left lung nodules- enlarges on follow up scans.\par On 8/25/17 he underwent wedge resection of two left lung pulmonary nodules ( Dr Luis Antonio Curtis [facility]) \par \par Pathology showed metastatic adenocarcinoma compatible with esophageal primary: 9 mm and 8 mm tumors with necrosis. 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  \par  \par CT CAP 9/29/21  : YURY\par \par In 2022 developed left sided visual field loss. \par MRI of brain with sabine 4/15/22 : large mixed cystic solid mass R parietal 4 x 3.5 x 2.8 cm with extensive edema\par CT CAP with contrast 4/15/22 no evidence of metastatic disease\par \par s/p partial resection of R occipital mass 4/19/22 ( Dr Novoa) : metastatic  poorly diff adenocarcinoma with necrosis c/w patient's history of esophageal carcinoma. \par F/u MRI  residual enhancing neoplasm noted superiorly.\par SRS to brain 6/13/22 ( 2700 cGy )- Dr Resendiz\par MRI brain Nov 2022- continuous improvement. No new lesions. \par \par MR head 3/15/23  postop changes  , increase in peripherally enhancing lesion- treatment related changes versus POD . reviewed by Dr Rollins- c/w radiation necrosis.\par CT CAP 4/4/23 YURY \par \par Seen by Dr Rollins. \par \par 6/7/23  Started helga 7.5 mg/kg q 21 days x 4 cycles for radiation necrosis  [de-identified] : moderately differentiated adenocarcinoma [de-identified] : HER 2 negative [de-identified] : endoscopic staging T3, N2\par surgical staging after neoadjuvant treatment : T2, N2  ( 3/27) [de-identified] : Tolerated first cycle of helga OK. \par No new c/o.\par Main issue- chronic low back pain ( evaluated extensively by spine ortho ) and prelim surgery was planned for August.

## 2023-06-28 NOTE — DISCUSSION/SUMMARY
[FreeTextEntry1] : 82 year old male with metastatic brain esophageal cancer s/p craniotomy and SRS now with progressive enhancement suggestive of RTN.  Ongoing VADIM.  I discusesed RI brain in 2mo to eval.  I discussed that he should not have surgery on VADIM. \par \par RADIATION NECROSIS:\par Ongoing VADIM 7.5 mg/kg q3w x 4 doses.\par MRI brain ordered for late 8/23 with perfusion\par Instructed to call office for any worsening symptoms.\par Hold on steroids or seizure meds\par Please delay back surgery till 6w post VADIM\par \par rtc 8 weeks.

## 2023-06-28 NOTE — REVIEW OF SYSTEMS
[Patient Intake Form Reviewed] : Patient intake form was reviewed [Negative] : Endocrine [FreeTextEntry9] : back pain not new  [de-identified] : per HPI

## 2023-06-29 DIAGNOSIS — Z51.11 ENCOUNTER FOR ANTINEOPLASTIC CHEMOTHERAPY: ICD-10-CM

## 2023-06-29 DIAGNOSIS — I67.89 OTHER CEREBROVASCULAR DISEASE: ICD-10-CM

## 2023-06-29 DIAGNOSIS — C79.31 SECONDARY MALIGNANT NEOPLASM OF BRAIN: ICD-10-CM

## 2023-07-19 ENCOUNTER — RESULT CHARGE (OUTPATIENT)
Age: 83
End: 2023-07-19

## 2023-07-19 ENCOUNTER — APPOINTMENT (OUTPATIENT)
Dept: INFUSION THERAPY | Facility: CLINIC | Age: 83
End: 2023-07-19

## 2023-07-19 VITALS
SYSTOLIC BLOOD PRESSURE: 137 MMHG | WEIGHT: 166.5 LBS | RESPIRATION RATE: 17 BRPM | BODY MASS INDEX: 26.08 KG/M2 | DIASTOLIC BLOOD PRESSURE: 81 MMHG | TEMPERATURE: 98 F | HEART RATE: 76 BPM | OXYGEN SATURATION: 98 %

## 2023-07-19 LAB
BILIRUB UR QL STRIP: NEGATIVE
GLUCOSE UR-MCNC: NEGATIVE
HCG UR QL: 0.2 EU/DL
HGB UR QL STRIP.AUTO: NEGATIVE
KETONES UR-MCNC: ABNORMAL
LEUKOCYTE ESTERASE UR QL STRIP: NEGATIVE
NITRITE UR QL STRIP: NEGATIVE
PH UR STRIP: 6
PROT UR STRIP-MCNC: NEGATIVE
SP GR UR STRIP: 1.02

## 2023-07-20 ENCOUNTER — APPOINTMENT (OUTPATIENT)
Dept: UROLOGY | Facility: CLINIC | Age: 83
End: 2023-07-20
Payer: MEDICARE

## 2023-07-20 VITALS
HEART RATE: 69 BPM | DIASTOLIC BLOOD PRESSURE: 78 MMHG | BODY MASS INDEX: 26.06 KG/M2 | WEIGHT: 166 LBS | RESPIRATION RATE: 16 BRPM | OXYGEN SATURATION: 96 % | HEIGHT: 67 IN | SYSTOLIC BLOOD PRESSURE: 129 MMHG

## 2023-07-20 DIAGNOSIS — N40.0 BENIGN PROSTATIC HYPERPLASIA WITHOUT LOWER URINARY TRACT SYMPMS: ICD-10-CM

## 2023-07-20 DIAGNOSIS — C15.5 MALIGNANT NEOPLASM OF LOWER THIRD OF ESOPHAGUS: ICD-10-CM

## 2023-07-20 PROCEDURE — 99213 OFFICE O/P EST LOW 20 MIN: CPT

## 2023-07-20 RX ORDER — ALFUZOSIN HYDROCHLORIDE 10 MG/1
10 TABLET, EXTENDED RELEASE ORAL
Qty: 90 | Refills: 3 | Status: ACTIVE | COMMUNITY
Start: 2021-07-14 | End: 1900-01-01

## 2023-07-20 NOTE — PHYSICAL EXAM
[General Appearance - Well Developed] : well developed [General Appearance - Well Nourished] : well nourished [Normal Appearance] : normal appearance [Well Groomed] : well groomed [General Appearance - In No Acute Distress] : no acute distress [Abdomen Soft] : soft [Abdomen Tenderness] : non-tender [Costovertebral Angle Tenderness] : no ~M costovertebral angle tenderness [Urethral Meatus] : meatus normal [Urinary Bladder Findings] : the bladder was normal on palpation [Scrotum] : the scrotum was normal [Testes Mass (___cm)] : there were no testicular masses [Edema] : no peripheral edema [] : no respiratory distress [Respiration, Rhythm And Depth] : normal respiratory rhythm and effort [Exaggerated Use Of Accessory Muscles For Inspiration] : no accessory muscle use [Oriented To Time, Place, And Person] : oriented to person, place, and time [Affect] : the affect was normal [Mood] : the mood was normal [Not Anxious] : not anxious [Normal Station and Gait] : the gait and station were normal for the patient's age [No Palpable Adenopathy] : no palpable adenopathy [No Focal Deficits] : no focal deficits [FreeTextEntry1] : small testicles

## 2023-07-20 NOTE — HISTORY OF PRESENT ILLNESS
[FreeTextEntry1] : 80 year old man seen 01/12/2021 with complaint of gradually increasing PSA and weak stream. This began over the years. Patient reports his PSA was around 1.49 in 2/2014 and now 3.08 in 9/2020 and pt reports it was around 3 on 12/7/20 (trend can be seen in outside medical record). Patient also notes some weak stream over time and reports he is on Alfuzosin 10 mg QHS.  \par It is moderate in severity. Nothing makes the symptoms better, nothing makes sx worse. \par It is associated with nothing.\par No hematuria, no dysuria, no frequency, no urgency, No incontinence. \par No fevers, no chills, no nausea, no vomiting, no flank pain. \par \par No family history contributory to prostate cancer or BPH.\par \par 1/12/21 PVR: 31 mL. \par \par 07/13/2021: Patient presents for follow up. He reports nocturia x1/night continues, not bothersome. No other  complaints. He repeated PSA, which was 3.15 (5/2021).\par \par 07/19/2022: Patient presents for follow up. He denies new  symptoms or complaints. He is s/p craniotomy for metastatic disease. \par \par 07/20/2023: Patient presents for follow up. He denies any LUTS. Tolerating alpha blocker. Nocturia x1/night, not bothersome. No hematuria, no dysuria, no frequency, no urgency, no hesitancy, no straining. No incontinence. \par No fevers, no chills, no nausea, no vomiting, no flank pain.

## 2023-07-20 NOTE — ASSESSMENT
[FreeTextEntry1] : 83 yo male with hx of BPH with LUTS, continue alpha blocker. Again discussed that prostate cancer screening is not recommended for his age, and especially in light of his metastatic cancer. RTO in 1 year or sooner PRN

## 2023-08-09 ENCOUNTER — RESULT REVIEW (OUTPATIENT)
Age: 83
End: 2023-08-09

## 2023-08-09 ENCOUNTER — APPOINTMENT (OUTPATIENT)
Dept: INFUSION THERAPY | Facility: CLINIC | Age: 83
End: 2023-08-09

## 2023-08-09 VITALS
BODY MASS INDEX: 26.39 KG/M2 | DIASTOLIC BLOOD PRESSURE: 70 MMHG | HEART RATE: 73 BPM | TEMPERATURE: 98.5 F | OXYGEN SATURATION: 98 % | WEIGHT: 168.5 LBS | SYSTOLIC BLOOD PRESSURE: 115 MMHG | RESPIRATION RATE: 18 BRPM

## 2023-08-09 LAB
ALBUMIN SERPL ELPH-MCNC: 4.2 G/DL — SIGNIFICANT CHANGE UP (ref 3.3–5)
ALP SERPL-CCNC: 66 U/L — SIGNIFICANT CHANGE UP (ref 40–120)
ALT FLD-CCNC: 19 U/L — SIGNIFICANT CHANGE UP (ref 10–45)
ANION GAP SERPL CALC-SCNC: 13 MMOL/L — SIGNIFICANT CHANGE UP (ref 5–17)
AST SERPL-CCNC: 25 U/L — SIGNIFICANT CHANGE UP (ref 10–40)
BASOPHILS # BLD AUTO: 0.06 K/UL — SIGNIFICANT CHANGE UP (ref 0–0.2)
BASOPHILS NFR BLD AUTO: 0.9 % — SIGNIFICANT CHANGE UP (ref 0–2)
BILIRUB SERPL-MCNC: 0.2 MG/DL — SIGNIFICANT CHANGE UP (ref 0.2–1.2)
BUN SERPL-MCNC: 28 MG/DL — HIGH (ref 7–23)
CALCIUM SERPL-MCNC: 9.8 MG/DL — SIGNIFICANT CHANGE UP (ref 8.4–10.5)
CEA SERPL-MCNC: 2.4 NG/ML — SIGNIFICANT CHANGE UP (ref 0–3.8)
CHLORIDE SERPL-SCNC: 102 MMOL/L — SIGNIFICANT CHANGE UP (ref 96–108)
CO2 SERPL-SCNC: 24 MMOL/L — SIGNIFICANT CHANGE UP (ref 22–31)
CREAT SERPL-MCNC: 1.17 MG/DL — SIGNIFICANT CHANGE UP (ref 0.5–1.3)
EGFR: 62 ML/MIN/1.73M2 — SIGNIFICANT CHANGE UP
EOSINOPHIL # BLD AUTO: 0.14 K/UL — SIGNIFICANT CHANGE UP (ref 0–0.5)
EOSINOPHIL NFR BLD AUTO: 2 % — SIGNIFICANT CHANGE UP (ref 0–6)
GLUCOSE SERPL-MCNC: 159 MG/DL — HIGH (ref 70–99)
HCT VFR BLD CALC: 38.8 % — LOW (ref 39–50)
HGB BLD-MCNC: 13.1 G/DL — SIGNIFICANT CHANGE UP (ref 13–17)
IMM GRANULOCYTES NFR BLD AUTO: 0.3 % — SIGNIFICANT CHANGE UP (ref 0–0.9)
LYMPHOCYTES # BLD AUTO: 0.92 K/UL — LOW (ref 1–3.3)
LYMPHOCYTES # BLD AUTO: 13.3 % — SIGNIFICANT CHANGE UP (ref 13–44)
MCHC RBC-ENTMCNC: 33 PG — SIGNIFICANT CHANGE UP (ref 27–34)
MCHC RBC-ENTMCNC: 33.8 GM/DL — SIGNIFICANT CHANGE UP (ref 32–36)
MCV RBC AUTO: 97.7 FL — SIGNIFICANT CHANGE UP (ref 80–100)
MONOCYTES # BLD AUTO: 0.6 K/UL — SIGNIFICANT CHANGE UP (ref 0–0.9)
MONOCYTES NFR BLD AUTO: 8.7 % — SIGNIFICANT CHANGE UP (ref 2–14)
NEUTROPHILS # BLD AUTO: 5.16 K/UL — SIGNIFICANT CHANGE UP (ref 1.8–7.4)
NEUTROPHILS NFR BLD AUTO: 74.8 % — SIGNIFICANT CHANGE UP (ref 43–77)
NRBC # BLD: 0 /100 WBCS — SIGNIFICANT CHANGE UP (ref 0–0)
PLATELET # BLD AUTO: 186 K/UL — SIGNIFICANT CHANGE UP (ref 150–400)
POTASSIUM SERPL-MCNC: 5.2 MMOL/L — SIGNIFICANT CHANGE UP (ref 3.5–5.3)
POTASSIUM SERPL-SCNC: 5.2 MMOL/L — SIGNIFICANT CHANGE UP (ref 3.5–5.3)
PROT SERPL-MCNC: 6.2 G/DL — SIGNIFICANT CHANGE UP (ref 6–8.3)
RBC # BLD: 3.97 M/UL — LOW (ref 4.2–5.8)
RBC # FLD: 12.5 % — SIGNIFICANT CHANGE UP (ref 10.3–14.5)
SODIUM SERPL-SCNC: 139 MMOL/L — SIGNIFICANT CHANGE UP (ref 135–145)
WBC # BLD: 6.9 K/UL — SIGNIFICANT CHANGE UP (ref 3.8–10.5)
WBC # FLD AUTO: 6.9 K/UL — SIGNIFICANT CHANGE UP (ref 3.8–10.5)

## 2023-08-22 ENCOUNTER — APPOINTMENT (OUTPATIENT)
Dept: NEUROLOGY | Facility: CLINIC | Age: 83
End: 2023-08-22
Payer: MEDICARE

## 2023-08-22 ENCOUNTER — APPOINTMENT (OUTPATIENT)
Dept: MRI IMAGING | Facility: IMAGING CENTER | Age: 83
End: 2023-08-22
Payer: MEDICARE

## 2023-08-22 ENCOUNTER — OUTPATIENT (OUTPATIENT)
Dept: OUTPATIENT SERVICES | Facility: HOSPITAL | Age: 83
LOS: 1 days | End: 2023-08-22
Payer: MEDICARE

## 2023-08-22 VITALS
DIASTOLIC BLOOD PRESSURE: 86 MMHG | TEMPERATURE: 98.1 F | SYSTOLIC BLOOD PRESSURE: 138 MMHG | WEIGHT: 166 LBS | BODY MASS INDEX: 26.06 KG/M2 | HEIGHT: 67 IN | OXYGEN SATURATION: 95 % | RESPIRATION RATE: 16 BRPM | HEART RATE: 71 BPM

## 2023-08-22 DIAGNOSIS — Z98.890 OTHER SPECIFIED POSTPROCEDURAL STATES: Chronic | ICD-10-CM

## 2023-08-22 DIAGNOSIS — C79.31 SECONDARY MALIGNANT NEOPLASM OF BRAIN: ICD-10-CM

## 2023-08-22 DIAGNOSIS — Z85.118 PERSONAL HISTORY OF OTHER MALIGNANT NEOPLASM OF BRONCHUS AND LUNG: Chronic | ICD-10-CM

## 2023-08-22 DIAGNOSIS — Z98.41 CATARACT EXTRACTION STATUS, RIGHT EYE: Chronic | ICD-10-CM

## 2023-08-22 DIAGNOSIS — Z90.89 ACQUIRED ABSENCE OF OTHER ORGANS: Chronic | ICD-10-CM

## 2023-08-22 DIAGNOSIS — Z90.49 ACQUIRED ABSENCE OF OTHER SPECIFIED PARTS OF DIGESTIVE TRACT: Chronic | ICD-10-CM

## 2023-08-22 DIAGNOSIS — Z98.1 ARTHRODESIS STATUS: Chronic | ICD-10-CM

## 2023-08-22 DIAGNOSIS — Z87.19 PERSONAL HISTORY OF OTHER DISEASES OF THE DIGESTIVE SYSTEM: Chronic | ICD-10-CM

## 2023-08-22 DIAGNOSIS — S62.101A FRACTURE OF UNSPECIFIED CARPAL BONE, RIGHT WRIST, INITIAL ENCOUNTER FOR CLOSED FRACTURE: Chronic | ICD-10-CM

## 2023-08-22 DIAGNOSIS — Z41.9 ENCOUNTER FOR PROCEDURE FOR PURPOSES OTHER THAN REMEDYING HEALTH STATE, UNSPECIFIED: Chronic | ICD-10-CM

## 2023-08-22 PROCEDURE — 70553 MRI BRAIN STEM W/O & W/DYE: CPT | Mod: 26,MH

## 2023-08-22 PROCEDURE — 99215 OFFICE O/P EST HI 40 MIN: CPT

## 2023-08-22 PROCEDURE — A9585: CPT

## 2023-08-22 PROCEDURE — 70553 MRI BRAIN STEM W/O & W/DYE: CPT

## 2023-08-22 NOTE — DISCUSSION/SUMMARY
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  We discussed holding VADIM and repeat MRI in 2mo, with planned back surgery in interim.  RADIATION NECROSIS: Hold VADIM New MRI Perfusion in 2mo  Is clear for back surgery after 9/20 rtc 8 weeks.
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
## 2023-08-22 NOTE — REVIEW OF SYSTEMS
Nurse to nurse report given to receiving unit. [As Noted in HPI] : as noted in HPI [Negative] : Heme/Lymph

## 2023-08-22 NOTE — DATA REVIEWED
[de-identified] : Serial MRI personally reviewed including 8/23, 5/23, 3/23, 11/22, 8/22, 6/22, and 4/22, there is improved enhancement and edema in right occipital lobe, and no new lesions, no hyperperfusion  reviewed with neuroradiology by phone today

## 2023-08-30 ENCOUNTER — OUTPATIENT (OUTPATIENT)
Dept: OUTPATIENT SERVICES | Facility: HOSPITAL | Age: 83
LOS: 1 days | Discharge: HOME | End: 2023-08-30

## 2023-08-30 DIAGNOSIS — Z87.19 PERSONAL HISTORY OF OTHER DISEASES OF THE DIGESTIVE SYSTEM: Chronic | ICD-10-CM

## 2023-08-30 DIAGNOSIS — Z98.41 CATARACT EXTRACTION STATUS, RIGHT EYE: Chronic | ICD-10-CM

## 2023-08-30 DIAGNOSIS — Z90.89 ACQUIRED ABSENCE OF OTHER ORGANS: Chronic | ICD-10-CM

## 2023-08-30 DIAGNOSIS — Z98.890 OTHER SPECIFIED POSTPROCEDURAL STATES: Chronic | ICD-10-CM

## 2023-08-30 DIAGNOSIS — S62.101A FRACTURE OF UNSPECIFIED CARPAL BONE, RIGHT WRIST, INITIAL ENCOUNTER FOR CLOSED FRACTURE: Chronic | ICD-10-CM

## 2023-08-30 DIAGNOSIS — Z85.118 PERSONAL HISTORY OF OTHER MALIGNANT NEOPLASM OF BRONCHUS AND LUNG: Chronic | ICD-10-CM

## 2023-08-30 DIAGNOSIS — Z98.1 ARTHRODESIS STATUS: Chronic | ICD-10-CM

## 2023-08-30 DIAGNOSIS — C15.9 MALIGNANT NEOPLASM OF ESOPHAGUS, UNSPECIFIED: ICD-10-CM

## 2023-08-30 DIAGNOSIS — Z90.49 ACQUIRED ABSENCE OF OTHER SPECIFIED PARTS OF DIGESTIVE TRACT: Chronic | ICD-10-CM

## 2023-08-30 DIAGNOSIS — Z41.9 ENCOUNTER FOR PROCEDURE FOR PURPOSES OTHER THAN REMEDYING HEALTH STATE, UNSPECIFIED: Chronic | ICD-10-CM

## 2023-09-01 ENCOUNTER — APPOINTMENT (OUTPATIENT)
Dept: HEMATOLOGY ONCOLOGY | Facility: CLINIC | Age: 83
End: 2023-09-01
Payer: MEDICARE

## 2023-09-01 VITALS
RESPIRATION RATE: 17 BRPM | TEMPERATURE: 98.3 F | OXYGEN SATURATION: 97 % | SYSTOLIC BLOOD PRESSURE: 133 MMHG | WEIGHT: 160 LBS | BODY MASS INDEX: 25.06 KG/M2 | HEART RATE: 72 BPM | DIASTOLIC BLOOD PRESSURE: 79 MMHG

## 2023-09-01 DIAGNOSIS — I67.89 OTHER CEREBROVASCULAR DISEASE: ICD-10-CM

## 2023-09-01 DIAGNOSIS — C79.31 SECONDARY MALIGNANT NEOPLASM OF BRAIN: ICD-10-CM

## 2023-09-01 PROCEDURE — 99214 OFFICE O/P EST MOD 30 MIN: CPT

## 2023-09-01 NOTE — REVIEW OF SYSTEMS
[Patient Intake Form Reviewed] : Patient intake form was reviewed [Negative] : Endocrine [FreeTextEntry9] : back pain per HPI  [de-identified] : per HPI

## 2023-09-01 NOTE — HISTORY OF PRESENT ILLNESS
[Disease: _____________________] : Disease: [unfilled] [de-identified] : Presented in 2015 with chest/ epigastric pain.EGD on 7/24/15 showed malignant ulcerated mass in lower third of esophagus. Biopsy c/w infiltrating moderately differentiated adenocarcinoma. EUS :  T3 N2 nonobstructing distal esophageal cancer . PET/CT no evidence of distant mets. S/p preoperative chemoRT with Taxol/ Carboplatin. On 11/11/15 he underwent left  VATS esophagogastrectomy with abdominal lymphadenectomy at [facility].   SUrgical pathology showed  residual 2.5 mm single focus  of moderately differentiated  adenocarcinoma invading muscularis propria, three  of 27 LN with metastatic disease.  Received 2 additional cycles of systemic adjuvant chemotherapy with Taxol and carboplatin- completed 2/19/16.  Surveillance scans showed tiny bilateral  pulmonary nodules. Right lung nodules were stable but left lung nodules- enlarges on follow up scans. On 8/25/17 he underwent wedge resection of two left lung pulmonary nodules ( Dr Luis Antonio Curtis [facility])   Pathology showed metastatic adenocarcinoma compatible with esophageal primary: 9 mm and 8 mm tumors with necrosis.   S/p " second adjuvant " chemotherapy ( minimal residual disease on scans )- Xeloda/ Oxaliplatin- Sept 2017- Feb 2018  Oxaliplatin stopped early December ( after 5 cycles) due to neuropathy.     CT CAP 9/29/21  : YURY  In 2022 developed left sided visual field loss.  MRI of brain with sabine 4/15/22 : large mixed cystic solid mass R parietal 4 x 3.5 x 2.8 cm with extensive edema CT CAP with contrast 4/15/22 no evidence of metastatic disease  s/p partial resection of R occipital mass 4/19/22 ( Dr Novoa) : metastatic  poorly diff adenocarcinoma with necrosis c/w patient's history of esophageal carcinoma.  F/u MRI  residual enhancing neoplasm noted superiorly. SRS to brain 6/13/22 ( 2700 cGy )- Dr Resendiz MRI brain Nov 2022- continuous improvement. No new lesions.   MR head 3/15/23  postop changes  , increase in peripherally enhancing lesion- treatment related changes versus POD . reviewed by Dr Rollins- c/w radiation necrosis. CT CAP 4/4/23 YURY   Seen by  neurooncology  Dr Rollins.   6/7/23 - 8/9/23  helga 7.5 mg/kg q 21 days x 4 cycles for radiation necrosis   MRI  brain 8/22/23  IMPRESSION: When compared with prior 5/16/2023, decreased edema in the region of residual enhancement. There is also decreased enhancement in association with the lesion with some peripheral enhancement remaining. Nodule in the posterior medial portion with the measurements given above is relatively unchanged. This does not appear to enhance and has lower signal on the T2-weighted imaging. Overall, lesion has decreased CBV and decreased CBF on perfusion more consistent with treatment-related changes. Diffusion hyperintensity with restricted diffusion on the ADC map in association with the lesion likely related to lesional devascularization.    [de-identified] : moderately differentiated adenocarcinoma [de-identified] : HER 2 negative [de-identified] : endoscopic staging T3, N2\par  surgical staging after neoadjuvant treatment : T2, N2  ( 3/27) [de-identified] : Chronic  / worsening low back pain . Affects his walking (  now with walker). Had extensive spine ortho eval. Laminectomy was planned for August- potponed till end of September because of Avastin treatment.

## 2023-09-01 NOTE — ASSESSMENT
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  S/p neoadjuvant chemoRT ( taxol/ carboplatin), s/p  R0 surgical resection on 11/11/15 for residual T2, N2 disease.  S/p 2 additional cycles of systemic chemotherapy with Taxol and carboplatin. In 2017 developed low volume  multiple pulmonary metastases- s/p wedge resection.   S/p " second adjuvant"  Xeloda/ Oxaliplatin- completed in February 2018. Remained YURY for five years  April 2022 diagnosed with large solitary metastasis to right occipital lobe s/p surgical resection. 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 S/p STS to site of brain met.  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 Evaluated by Dr Rollins and bevacizumab recommended.  S/p  helga  x 4 cycles   6/7/23-  8/9/23   MRI brain 8/22/23 showed improvement. Follow up Brain MRI perfusion in 2 months ( end of October)- ordered by Dr Rollins.  OK for back surgery laminectomy  after 9/20/23  ( scheduled for Oct 2 )   Continue monitoring to r/o systemic recurrence . Will  be due for CT CAP with contrast in November/ December  Exam in December - after scans / sooner PRN

## 2023-09-01 NOTE — PHYSICAL EXAM
[Normal] : well developed, well nourished, in no acute distress [de-identified] : looks well  [de-identified] : walks with walker due to back pain   [de-identified] : no overt focal deficits

## 2023-09-13 NOTE — H&P ADULT - NSICDXNOFAMILYHX_GEN_ALL_CORE
"  Problem: Infection  Goal: Absence of Infection Signs and Symptoms  Outcome: Progressing   Goal Outcome Evaluation:         Pt is pleasant and coopertive with all cares and treatments.    Right  lower arm with erythema and edema noted. Area that was demarcated yesterday has not spread out any further. There is a small 1 cm circular area that on lower arm that is not draining, but appears raised and open in nature.Pt stated that the area is painful at times but that it is not \"unbearable\". She requested Tylenol x 1 this shift. Ice was also applied to the area x 2 this shift.    Blood cultures are still pending    PIV left arm P/I and running Abx as ordered    Fetal heart tones were done by [facility] nurse this afternoon.    Pt up and ambulating in the hallway as well as in her room.    Eating well and denies nausea or vomit              " <-- Click to add NO pertinent Family History

## 2023-09-21 ENCOUNTER — OUTPATIENT (OUTPATIENT)
Dept: OUTPATIENT SERVICES | Facility: HOSPITAL | Age: 83
LOS: 1 days | End: 2023-09-21
Payer: MEDICARE

## 2023-09-21 VITALS
SYSTOLIC BLOOD PRESSURE: 149 MMHG | RESPIRATION RATE: 16 BRPM | WEIGHT: 160.06 LBS | TEMPERATURE: 98 F | DIASTOLIC BLOOD PRESSURE: 71 MMHG | HEART RATE: 70 BPM | HEIGHT: 66.5 IN | OXYGEN SATURATION: 98 %

## 2023-09-21 DIAGNOSIS — Z98.41 CATARACT EXTRACTION STATUS, RIGHT EYE: Chronic | ICD-10-CM

## 2023-09-21 DIAGNOSIS — Z98.1 ARTHRODESIS STATUS: Chronic | ICD-10-CM

## 2023-09-21 DIAGNOSIS — Z41.9 ENCOUNTER FOR PROCEDURE FOR PURPOSES OTHER THAN REMEDYING HEALTH STATE, UNSPECIFIED: Chronic | ICD-10-CM

## 2023-09-21 DIAGNOSIS — Z98.890 OTHER SPECIFIED POSTPROCEDURAL STATES: Chronic | ICD-10-CM

## 2023-09-21 DIAGNOSIS — Z01.818 ENCOUNTER FOR OTHER PREPROCEDURAL EXAMINATION: ICD-10-CM

## 2023-09-21 DIAGNOSIS — M51.36 OTHER INTERVERTEBRAL DISC DEGENERATION, LUMBAR REGION: ICD-10-CM

## 2023-09-21 DIAGNOSIS — Z90.49 ACQUIRED ABSENCE OF OTHER SPECIFIED PARTS OF DIGESTIVE TRACT: Chronic | ICD-10-CM

## 2023-09-21 DIAGNOSIS — Z90.89 ACQUIRED ABSENCE OF OTHER ORGANS: Chronic | ICD-10-CM

## 2023-09-21 DIAGNOSIS — S62.101A FRACTURE OF UNSPECIFIED CARPAL BONE, RIGHT WRIST, INITIAL ENCOUNTER FOR CLOSED FRACTURE: Chronic | ICD-10-CM

## 2023-09-21 DIAGNOSIS — Z01.89 ENCOUNTER FOR OTHER SPECIFIED SPECIAL EXAMINATIONS: Chronic | ICD-10-CM

## 2023-09-21 DIAGNOSIS — Z87.19 PERSONAL HISTORY OF OTHER DISEASES OF THE DIGESTIVE SYSTEM: Chronic | ICD-10-CM

## 2023-09-21 DIAGNOSIS — Z85.118 PERSONAL HISTORY OF OTHER MALIGNANT NEOPLASM OF BRONCHUS AND LUNG: Chronic | ICD-10-CM

## 2023-09-21 LAB
APPEARANCE UR: CLEAR — SIGNIFICANT CHANGE UP
BASOPHILS # BLD AUTO: 0.05 K/UL — SIGNIFICANT CHANGE UP (ref 0–0.2)
BASOPHILS NFR BLD AUTO: 0.7 % — SIGNIFICANT CHANGE UP (ref 0–2)
BILIRUB UR-MCNC: NEGATIVE — SIGNIFICANT CHANGE UP
COLOR SPEC: YELLOW — SIGNIFICANT CHANGE UP
COMMENT - URINE: SIGNIFICANT CHANGE UP
DIFF PNL FLD: NEGATIVE — SIGNIFICANT CHANGE UP
EOSINOPHIL # BLD AUTO: 0.25 K/UL — SIGNIFICANT CHANGE UP (ref 0–0.5)
EOSINOPHIL NFR BLD AUTO: 3.6 % — SIGNIFICANT CHANGE UP (ref 0–6)
EPI CELLS # UR: SIGNIFICANT CHANGE UP
GLUCOSE UR QL: NEGATIVE — SIGNIFICANT CHANGE UP
HCT VFR BLD CALC: 40.1 % — SIGNIFICANT CHANGE UP (ref 39–50)
HGB BLD-MCNC: 13.6 G/DL — SIGNIFICANT CHANGE UP (ref 13–17)
IMM GRANULOCYTES NFR BLD AUTO: 0.3 % — SIGNIFICANT CHANGE UP (ref 0–0.9)
KETONES UR-MCNC: NEGATIVE — SIGNIFICANT CHANGE UP
LEUKOCYTE ESTERASE UR-ACNC: ABNORMAL
LYMPHOCYTES # BLD AUTO: 0.91 K/UL — LOW (ref 1–3.3)
LYMPHOCYTES # BLD AUTO: 13.1 % — SIGNIFICANT CHANGE UP (ref 13–44)
MCHC RBC-ENTMCNC: 32.8 PG — SIGNIFICANT CHANGE UP (ref 27–34)
MCHC RBC-ENTMCNC: 33.9 GM/DL — SIGNIFICANT CHANGE UP (ref 32–36)
MCV RBC AUTO: 96.6 FL — SIGNIFICANT CHANGE UP (ref 80–100)
MONOCYTES # BLD AUTO: 0.72 K/UL — SIGNIFICANT CHANGE UP (ref 0–0.9)
MONOCYTES NFR BLD AUTO: 10.4 % — SIGNIFICANT CHANGE UP (ref 2–14)
NEUTROPHILS # BLD AUTO: 4.99 K/UL — SIGNIFICANT CHANGE UP (ref 1.8–7.4)
NEUTROPHILS NFR BLD AUTO: 71.9 % — SIGNIFICANT CHANGE UP (ref 43–77)
NITRITE UR-MCNC: NEGATIVE — SIGNIFICANT CHANGE UP
PH UR: 6 — SIGNIFICANT CHANGE UP (ref 5–8)
PLATELET # BLD AUTO: 215 K/UL — SIGNIFICANT CHANGE UP (ref 150–400)
PROT UR-MCNC: 15
RBC # BLD: 4.15 M/UL — LOW (ref 4.2–5.8)
RBC # FLD: 12.8 % — SIGNIFICANT CHANGE UP (ref 10.3–14.5)
RBC CASTS # UR COMP ASSIST: NEGATIVE /HPF — SIGNIFICANT CHANGE UP (ref 0–4)
SP GR SPEC: 1.01 — SIGNIFICANT CHANGE UP (ref 1.01–1.02)
UROBILINOGEN FLD QL: NEGATIVE — SIGNIFICANT CHANGE UP
WBC # BLD: 6.94 K/UL — SIGNIFICANT CHANGE UP (ref 3.8–10.5)
WBC # FLD AUTO: 6.94 K/UL — SIGNIFICANT CHANGE UP (ref 3.8–10.5)
WBC UR QL: SIGNIFICANT CHANGE UP /HPF (ref 0–5)

## 2023-09-21 PROCEDURE — 85730 THROMBOPLASTIN TIME PARTIAL: CPT

## 2023-09-21 PROCEDURE — 93005 ELECTROCARDIOGRAM TRACING: CPT

## 2023-09-21 PROCEDURE — 81001 URINALYSIS AUTO W/SCOPE: CPT

## 2023-09-21 PROCEDURE — 86850 RBC ANTIBODY SCREEN: CPT

## 2023-09-21 PROCEDURE — 86900 BLOOD TYPING SEROLOGIC ABO: CPT

## 2023-09-21 PROCEDURE — 80053 COMPREHEN METABOLIC PANEL: CPT

## 2023-09-21 PROCEDURE — 71046 X-RAY EXAM CHEST 2 VIEWS: CPT | Mod: 26

## 2023-09-21 PROCEDURE — 99214 OFFICE O/P EST MOD 30 MIN: CPT | Mod: 25

## 2023-09-21 PROCEDURE — 87640 STAPH A DNA AMP PROBE: CPT

## 2023-09-21 PROCEDURE — 85610 PROTHROMBIN TIME: CPT

## 2023-09-21 PROCEDURE — 71046 X-RAY EXAM CHEST 2 VIEWS: CPT

## 2023-09-21 PROCEDURE — 87641 MR-STAPH DNA AMP PROBE: CPT

## 2023-09-21 PROCEDURE — 36415 COLL VENOUS BLD VENIPUNCTURE: CPT

## 2023-09-21 PROCEDURE — 83036 HEMOGLOBIN GLYCOSYLATED A1C: CPT

## 2023-09-21 PROCEDURE — 93010 ELECTROCARDIOGRAM REPORT: CPT

## 2023-09-21 PROCEDURE — 85025 COMPLETE CBC W/AUTO DIFF WBC: CPT

## 2023-09-21 PROCEDURE — 86901 BLOOD TYPING SEROLOGIC RH(D): CPT

## 2023-09-21 RX ORDER — PREGABALIN 225 MG/1
1 CAPSULE ORAL
Qty: 0 | Refills: 0 | DISCHARGE

## 2023-09-21 RX ORDER — SIMVASTATIN 20 MG/1
1 TABLET, FILM COATED ORAL
Qty: 0 | Refills: 0 | DISCHARGE

## 2023-09-21 RX ORDER — HYDROCHLOROTHIAZIDE 25 MG
1 TABLET ORAL
Qty: 0 | Refills: 0 | DISCHARGE

## 2023-09-21 RX ORDER — CHOLECALCIFEROL (VITAMIN D3) 125 MCG
1 CAPSULE ORAL
Qty: 0 | Refills: 0 | DISCHARGE

## 2023-09-21 RX ORDER — MULTIVIT-MIN/FERROUS GLUCONATE 9 MG/15 ML
1 LIQUID (ML) ORAL
Qty: 0 | Refills: 0 | DISCHARGE

## 2023-09-21 NOTE — H&P PST ADULT - HISTORY OF PRESENT ILLNESS
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

## 2023-09-21 NOTE — H&P PST ADULT - NSICDXPASTMEDICALHX_GEN_ALL_CORE_FT
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

## 2023-09-21 NOTE — H&P PST ADULT - ASSESSMENT
Plan:  1. PST instructions given ; NPO status/  instructions to be given by ASU   2. Pt instructed to take following meds on day of surgery:   3. Pt instructed to take routine evening medications unless indicated   4. Stop NSAIDS ( Aspirin Alev Motrin Mobic Diclofenac), herbal supplements , MVI , Vitamin fish oil 7 days prior to surgery  unless   directed by surgeon or cardiologist;   5. Medical Optimization  with    6. EZ wash instructions given & mupirocin instructions given  7. Labs EKG CXR as per surgeon request   8. Pt denies covid symptoms shortness of breath fever cough     CAPRINI SCORE [CLOT]    AGE RELATED RISK FACTORS                                                       MOBILITY RELATED FACTORS  [ ] Age 41-60 years                                            (1 Point)                  [ ] Bed rest                                                        (1 Point)  [ ] Age: 61-74 years                                           (2 Points)                 [ ] Plaster cast                                                   (2 Points)  [ ] Age= 75 years                                              (3 Points)                 [ ] Bed bound for more than 72 hours                 (2 Points)    DISEASE RELATED RISK FACTORS                                               GENDER SPECIFIC FACTORS  [ ] Edema in the lower extremities                       (1 Point)                  [ ] Pregnancy                                                     (1 Point)  [ ] Varicose veins                                               (1 Point)                  [ ] Post-partum < 6 weeks                                   (1 Point)             [ ] BMI > 25 Kg/m2                                            (1 Point)                  [ ] Hormonal therapy  or oral contraception          (1 Point)                 [ ] Sepsis (in the previous month)                        (1 Point)                  [ ] History of pregnancy complications                 (1 point)  [ ] Pneumonia or serious lung disease                                               [ ] Unexplained or recurrent                     (1 Point)           (in the previous month)                               (1 Point)  [ ] Abnormal pulmonary function test                     (1 Point)                 SURGERY RELATED RISK FACTORS  [ ] Acute myocardial infarction                              (1 Point)                 [ ]  Section                                             (1 Point)  [ ] Congestive heart failure (in the previous month)  (1 Point)               [ ] Minor surgery                                                  (1 Point)   [ ] Inflammatory bowel disease                             (1 Point)                 [ ] Arthroscopic surgery                                        (2 Points)  [ ] Central venous access                                      (2 Points)                [ ] General surgery lasting more than 45 minutes   (2 Points)       [ ] Stroke (in the previous month)                          (5 Points)               [ ] Elective arthroplasty                                         (5 Points)            ( ) presents and past malignancy                           (2 points)                                                                                                         HEMATOLOGY RELATED FACTORS                                                 TRAUMA RELATED RISK FACTORS  [ ] Prior episodes of VTE                                     (3 Points)                 [ ] Fracture of the hip, pelvis, or leg                       (5 Points)  [ ] Positive family history for VTE                         (3 Points)                 [ ] Acute spinal cord injury (in the previous month)  (5 Points)  [ ] Prothrombin 09904 A                                     (3 Points)                 [ ] Paralysis  (less than 1 month)                             (5 Points)  [ ] Factor V Leiden                                             (3 Points)                  [ ] Multiple Trauma within 1 month                        (5 Points)  [ ] Lupus anticoagulants                                     (3 Points)                                                           [ ] Anticardiolipin antibodies                               (3 Points)                                                       [ ] High homocysteine in the blood                      (3 Points)                                             [ ] Other congenital or acquired thrombophilia      (3 Points)                                                [ ] Heparin induced thrombocytopenia                  (3 Points)                                          Total Score [          ] 82 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 PST instructions given ; NPO status/  instructions to be given by ASU   2. Pt instructed to take following meds on day of surgery: Alfuzosin omeprazole   3. Pt instructed to take routine evening medications unless indicated   4. Stop NSAIDS ( Aspirin Alev Motrin Mobic Diclofenac), herbal supplements , MVI , Vitamin fish oil 7 days prior to surgery  unless   directed by surgeon or cardiologist;   5. Medical Optimization  with Dr Presley  6. EZ wash instructions given & mupirocin instructions given  7. Labs EKG CXR as per surgeon request   8. Pt denies covid symptoms shortness of breath fever cough     CAPRINI SCORE [CLOT]    AGE RELATED RISK FACTORS                                                       MOBILITY RELATED FACTORS  [ ] Age 41-60 years                                            (1 Point)                  [ ] Bed rest                                                        (1 Point)  [ ] Age: 61-74 years                                           (2 Points)                 [ ] Plaster cast                                                   (2 Points)  [x ] Age= 75 years                                              (3 Points)                 [ ] Bed bound for more than 72 hours                 (2 Points)    DISEASE RELATED RISK FACTORS                                               GENDER SPECIFIC FACTORS  [ ] Edema in the lower extremities                       (1 Point)                  [ ] Pregnancy                                                     (1 Point)  [ ] Varicose veins                                               (1 Point)                  [ ] Post-partum < 6 weeks                                   (1 Point)             [x ] BMI > 25 Kg/m2                                            (1 Point)                  [ ] Hormonal therapy  or oral contraception          (1 Point)                 [ ] Sepsis (in the previous month)                        (1 Point)                  [ ] History of pregnancy complications                 (1 point)  [ ] Pneumonia or serious lung disease                                               [ ] Unexplained or recurrent                     (1 Point)           (in the previous month)                               (1 Point)  [ ] Abnormal pulmonary function test                     (1 Point)                 SURGERY RELATED RISK FACTORS  [ ] Acute myocardial infarction                              (1 Point)                 [ ]  Section                                             (1 Point)  [ ] Congestive heart failure (in the previous month)  (1 Point)               [ ] Minor surgery                                                  (1 Point)   [ ] Inflammatory bowel disease                             (1 Point)                 [ ] Arthroscopic surgery                                        (2 Points)  [ ] Central venous access                                      (2 Points)                [x ] General surgery lasting more than 45 minutes   (2 Points)       [ ] Stroke (in the previous month)                          (5 Points)               [ ] Elective arthroplasty                                         (5 Points)            (x ) presents and past malignancy                           (2 points)                                                                                                         HEMATOLOGY RELATED FACTORS                                                 TRAUMA RELATED RISK FACTORS  [ ] Prior episodes of VTE                                     (3 Points)                 [ ] Fracture of the hip, pelvis, or leg                       (5 Points)  [ ] Positive family history for VTE                         (3 Points)                 [ ] Acute spinal cord injury (in the previous month)  (5 Points)  [ ] Prothrombin 20726 A                                     (3 Points)                 [ ] Paralysis  (less than 1 month)                             (5 Points)  [ ] Factor V Leiden                                             (3 Points)                  [ ] Multiple Trauma within 1 month                        (5 Points)  [ ] Lupus anticoagulants                                     (3 Points)                                                           [ ] Anticardiolipin antibodies                               (3 Points)                                                       [ ] High homocysteine in the blood                      (3 Points)                                             [ ] Other congenital or acquired thrombophilia      (3 Points)                                                [ ] Heparin induced thrombocytopenia                  (3 Points)                                          Total Score [   8       ]  The Caprini score indicates that this patient is at high risk for a VTE event (score 6 or greater). Surgical patients in this group will benefit from both pharmacologic prophylaxis and intermittent compression devices.  The surgical team will determine the balance between VTE risk and bleeding risk, and other clinical considerations

## 2023-09-21 NOTE — H&P PST ADULT - NSICDXPASTSURGICALHX_GEN_ALL_CORE_FT
PAST SURGICAL HISTORY:  Elective surgery port placement 2017 port removal 2018    H/O esophagectomy 2015    H/O ventral hernia umbilical 1998    H/O: lung cancer lung resection LEFT 2017    History of bilateral cataract extraction     History of fusion of thoracic spine 2011    History of shoulder surgery bilateral rotator cuff    S/P carpal tunnel release rift 2014    S/P tonsillectomy and adenoidectomy     Wrist fracture, bilateral      PAST SURGICAL HISTORY:  Elective surgery port placement 2017 port removal 2018    H/O craniotomy     H/O esophagectomy 2015    H/O ventral hernia umbilical 1998    H/O: lung cancer lung resection LEFT 2017    History of bilateral cataract extraction     History of esophagectomy     History of fusion of thoracic spine 2011    History of removal of Port-a-Cath     History of shoulder surgery bilateral rotator cuff    History of thoracic spinal fusion     Normal thoracoscopy     S/P ablation of atrial flutter     S/P breast lumpectomy     [facility]    S/P left rotator cuff repair     S/P right rotator cuff repair     S/P tonsillectomy and adenoidectomy     Wrist fracture, bilateral

## 2023-09-22 DIAGNOSIS — M51.36 OTHER INTERVERTEBRAL DISC DEGENERATION, LUMBAR REGION: ICD-10-CM

## 2023-09-22 DIAGNOSIS — Z01.818 ENCOUNTER FOR OTHER PREPROCEDURAL EXAMINATION: ICD-10-CM

## 2023-09-22 LAB
A1C WITH ESTIMATED AVERAGE GLUCOSE RESULT: 6.4 % — HIGH (ref 4–5.6)
ALBUMIN SERPL ELPH-MCNC: 3.9 G/DL — SIGNIFICANT CHANGE UP (ref 3.3–5)
ALP SERPL-CCNC: 73 U/L — SIGNIFICANT CHANGE UP (ref 40–120)
ALT FLD-CCNC: 31 U/L — SIGNIFICANT CHANGE UP (ref 12–78)
ANION GAP SERPL CALC-SCNC: 1 MMOL/L — LOW (ref 5–17)
APTT BLD: 37.9 SEC — HIGH (ref 24.5–35.6)
AST SERPL-CCNC: 26 U/L — SIGNIFICANT CHANGE UP (ref 15–37)
BILIRUB SERPL-MCNC: 0.4 MG/DL — SIGNIFICANT CHANGE UP (ref 0.2–1.2)
BLD GP AB SCN SERPL QL: SIGNIFICANT CHANGE UP
BUN SERPL-MCNC: 23 MG/DL — SIGNIFICANT CHANGE UP (ref 7–23)
CALCIUM SERPL-MCNC: 9.9 MG/DL — SIGNIFICANT CHANGE UP (ref 8.5–10.1)
CHLORIDE SERPL-SCNC: 104 MMOL/L — SIGNIFICANT CHANGE UP (ref 96–108)
CO2 SERPL-SCNC: 30 MMOL/L — SIGNIFICANT CHANGE UP (ref 22–31)
CREAT SERPL-MCNC: 1.12 MG/DL — SIGNIFICANT CHANGE UP (ref 0.5–1.3)
EGFR: 66 ML/MIN/1.73M2 — SIGNIFICANT CHANGE UP
ESTIMATED AVERAGE GLUCOSE: 137 MG/DL — HIGH (ref 68–114)
GLUCOSE SERPL-MCNC: 109 MG/DL — HIGH (ref 70–99)
INR BLD: 0.99 RATIO — SIGNIFICANT CHANGE UP (ref 0.85–1.18)
MRSA PCR RESULT.: SIGNIFICANT CHANGE UP
POTASSIUM SERPL-MCNC: 4.9 MMOL/L — SIGNIFICANT CHANGE UP (ref 3.5–5.3)
POTASSIUM SERPL-SCNC: 4.9 MMOL/L — SIGNIFICANT CHANGE UP (ref 3.5–5.3)
PROT SERPL-MCNC: 7.7 GM/DL — SIGNIFICANT CHANGE UP (ref 6–8.3)
PROTHROM AB SERPL-ACNC: 11.2 SEC — SIGNIFICANT CHANGE UP (ref 9.5–13)
S AUREUS DNA NOSE QL NAA+PROBE: DETECTED
SODIUM SERPL-SCNC: 135 MMOL/L — SIGNIFICANT CHANGE UP (ref 135–145)

## 2023-09-22 NOTE — CHART NOTE - NSCHARTNOTEFT_GEN_A_CORE
MSSA detected from nasal swab done in PST on 9/21/2023. Patient instructed to apply Mupirocin to nostrils 2 times per day for 5 days starting 9/27/2023. Patient was able to verbalize instructions. Questions answered.

## 2023-10-02 ENCOUNTER — OUTPATIENT (OUTPATIENT)
Dept: INPATIENT UNIT | Facility: HOSPITAL | Age: 83
LOS: 1 days | Discharge: HOME | End: 2023-10-02
Payer: MEDICARE

## 2023-10-02 ENCOUNTER — TRANSCRIPTION ENCOUNTER (OUTPATIENT)
Age: 83
End: 2023-10-02

## 2023-10-02 VITALS
OXYGEN SATURATION: 100 % | TEMPERATURE: 98 F | DIASTOLIC BLOOD PRESSURE: 70 MMHG | RESPIRATION RATE: 16 BRPM | SYSTOLIC BLOOD PRESSURE: 137 MMHG | HEART RATE: 77 BPM

## 2023-10-02 VITALS
RESPIRATION RATE: 15 BRPM | WEIGHT: 160.06 LBS | SYSTOLIC BLOOD PRESSURE: 142 MMHG | DIASTOLIC BLOOD PRESSURE: 84 MMHG | HEART RATE: 58 BPM | HEIGHT: 66.5 IN | OXYGEN SATURATION: 100 % | TEMPERATURE: 98 F

## 2023-10-02 DIAGNOSIS — Z98.1 ARTHRODESIS STATUS: Chronic | ICD-10-CM

## 2023-10-02 DIAGNOSIS — Z41.9 ENCOUNTER FOR PROCEDURE FOR PURPOSES OTHER THAN REMEDYING HEALTH STATE, UNSPECIFIED: Chronic | ICD-10-CM

## 2023-10-02 DIAGNOSIS — Z90.49 ACQUIRED ABSENCE OF OTHER SPECIFIED PARTS OF DIGESTIVE TRACT: Chronic | ICD-10-CM

## 2023-10-02 DIAGNOSIS — M51.36 OTHER INTERVERTEBRAL DISC DEGENERATION, LUMBAR REGION: ICD-10-CM

## 2023-10-02 DIAGNOSIS — Z98.890 OTHER SPECIFIED POSTPROCEDURAL STATES: Chronic | ICD-10-CM

## 2023-10-02 DIAGNOSIS — M51.26 OTHER INTERVERTEBRAL DISC DISPLACEMENT, LUMBAR REGION: ICD-10-CM

## 2023-10-02 DIAGNOSIS — Z01.89 ENCOUNTER FOR OTHER SPECIFIED SPECIAL EXAMINATIONS: Chronic | ICD-10-CM

## 2023-10-02 DIAGNOSIS — Z87.19 PERSONAL HISTORY OF OTHER DISEASES OF THE DIGESTIVE SYSTEM: Chronic | ICD-10-CM

## 2023-10-02 DIAGNOSIS — S62.101A FRACTURE OF UNSPECIFIED CARPAL BONE, RIGHT WRIST, INITIAL ENCOUNTER FOR CLOSED FRACTURE: Chronic | ICD-10-CM

## 2023-10-02 DIAGNOSIS — Z90.89 ACQUIRED ABSENCE OF OTHER ORGANS: Chronic | ICD-10-CM

## 2023-10-02 DIAGNOSIS — Z98.41 CATARACT EXTRACTION STATUS, RIGHT EYE: Chronic | ICD-10-CM

## 2023-10-02 DIAGNOSIS — Z85.118 PERSONAL HISTORY OF OTHER MALIGNANT NEOPLASM OF BRONCHUS AND LUNG: Chronic | ICD-10-CM

## 2023-10-02 LAB
GLUCOSE BLDC GLUCOMTR-MCNC: 109 MG/DL — HIGH (ref 70–99)
GLUCOSE BLDC GLUCOMTR-MCNC: 91 MG/DL — SIGNIFICANT CHANGE UP (ref 70–99)

## 2023-10-02 PROCEDURE — 76000 FLUOROSCOPY <1 HR PHYS/QHP: CPT

## 2023-10-02 PROCEDURE — 82962 GLUCOSE BLOOD TEST: CPT

## 2023-10-02 PROCEDURE — C1889: CPT

## 2023-10-02 PROCEDURE — 63030 LAMOT DCMPRN NRV RT 1 LMBR: CPT | Mod: AS,RT

## 2023-10-02 RX ORDER — SODIUM CHLORIDE 9 MG/ML
1000 INJECTION, SOLUTION INTRAVENOUS
Refills: 0 | Status: DISCONTINUED | OUTPATIENT
Start: 2023-10-02 | End: 2023-10-02

## 2023-10-02 RX ORDER — ACETAMINOPHEN 500 MG
1000 TABLET ORAL ONCE
Refills: 0 | Status: DISCONTINUED | OUTPATIENT
Start: 2023-10-02 | End: 2023-10-02

## 2023-10-02 RX ORDER — MELOXICAM 15 MG/1
1 TABLET ORAL
Refills: 0 | DISCHARGE

## 2023-10-02 RX ORDER — OXYCODONE AND ACETAMINOPHEN 5; 325 MG/1; MG/1
1 TABLET ORAL
Qty: 30 | Refills: 0
Start: 2023-10-02

## 2023-10-02 RX ORDER — OXYCODONE HYDROCHLORIDE 5 MG/1
5 TABLET ORAL ONCE
Refills: 0 | Status: DISCONTINUED | OUTPATIENT
Start: 2023-10-02 | End: 2023-10-02

## 2023-10-02 RX ORDER — KETOROLAC TROMETHAMINE 30 MG/ML
15 SYRINGE (ML) INJECTION ONCE
Refills: 0 | Status: DISCONTINUED | OUTPATIENT
Start: 2023-10-02 | End: 2023-10-02

## 2023-10-02 RX ORDER — CYCLOBENZAPRINE HYDROCHLORIDE 10 MG/1
1 TABLET, FILM COATED ORAL
Qty: 42 | Refills: 0
Start: 2023-10-02 | End: 2023-10-15

## 2023-10-02 RX ORDER — FENTANYL CITRATE 50 UG/ML
50 INJECTION INTRAVENOUS
Refills: 0 | Status: DISCONTINUED | OUTPATIENT
Start: 2023-10-02 | End: 2023-10-02

## 2023-10-02 RX ORDER — HYDROMORPHONE HYDROCHLORIDE 2 MG/ML
0.5 INJECTION INTRAMUSCULAR; INTRAVENOUS; SUBCUTANEOUS
Refills: 0 | Status: DISCONTINUED | OUTPATIENT
Start: 2023-10-02 | End: 2023-10-02

## 2023-10-02 RX ORDER — ONDANSETRON 8 MG/1
4 TABLET, FILM COATED ORAL ONCE
Refills: 0 | Status: DISCONTINUED | OUTPATIENT
Start: 2023-10-02 | End: 2023-10-02

## 2023-10-02 NOTE — ASU PATIENT PROFILE, ADULT - NSICDXPASTSURGICALHX_GEN_ALL_CORE_FT
Patient expressed no known problems or needs PAST SURGICAL HISTORY:  Elective surgery port placement 2017 port removal 2018    H/O craniotomy     H/O esophagectomy 2015    H/O ventral hernia umbilical 1998    H/O: lung cancer lung resection LEFT 2017    History of bilateral cataract extraction     History of esophagectomy     History of fusion of thoracic spine 2011    History of removal of Port-a-Cath     History of shoulder surgery bilateral rotator cuff    History of thoracic spinal fusion     Normal thoracoscopy     S/P ablation of atrial flutter     S/P breast lumpectomy     [facility]    S/P left rotator cuff repair     S/P right rotator cuff repair     S/P tonsillectomy and adenoidectomy     Wrist fracture, bilateral

## 2023-10-02 NOTE — ASU DISCHARGE PLAN (ADULT/PEDIATRIC) - NS MD DC FALL RISK RISK
For information on Fall & Injury Prevention, visit: [email or web address] OR  [email or web address] OR  https://www.cdc.gov/steadi/patient.html

## 2023-10-02 NOTE — ASU DISCHARGE PLAN (ADULT/PEDIATRIC) - CARE PROVIDER_API CALL
Luis Antonio Arguelles  Neurosurgery  [address], [address]  [place] [address]  Phone: [phone]  Fax: [phone]  Follow Up Time: 2 weeks

## 2023-10-02 NOTE — ASU DISCHARGE PLAN (ADULT/PEDIATRIC) - ASU DC SPECIAL INSTRUCTIONSFT
Advance diet and activity as tolerated  Avoid heavy lifting, bending or twisting  Lift nothing more than 10 lbs  Take pain medications as prescribed  Drink plenty of water to avoid constipation, take over the counter stool softeners if needed  Patient may shower, wash incision with soap and water and pat dry, do not submerge incision under water-- no bath tubs, hot tubs, or swimming pools  If you develop redness, swelling, bleeding, discharge, fever, or increased pain please call the office   If you develop chest pain or shortness of breath please go to the nearest emergency room   You may drive, but do not drive if distracted by pain or narcotic medication  Please do not hesitate to call the office with any questions or concerns

## 2023-10-02 NOTE — ASU PATIENT PROFILE, ADULT - NSICDXPASTMEDICALHX_GEN_ALL_CORE_FT
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

## 2023-10-02 NOTE — ASU PREOP CHECKLIST - TEMPERATURE IN CELSIUS (DEGREES C)
Patient states he has not been feeling well for the past few days, states he takes his medications as afforded.  Was being seen at [address] [place] [address] and brought to the ED as they were not able to give steroids 36.5

## 2023-10-06 DIAGNOSIS — E78.5 HYPERLIPIDEMIA, UNSPECIFIED: ICD-10-CM

## 2023-10-06 DIAGNOSIS — M51.26 OTHER INTERVERTEBRAL DISC DISPLACEMENT, LUMBAR REGION: ICD-10-CM

## 2023-10-06 DIAGNOSIS — Z85.841 PERSONAL HISTORY OF MALIGNANT NEOPLASM OF BRAIN: ICD-10-CM

## 2023-10-06 DIAGNOSIS — Z85.118 PERSONAL HISTORY OF OTHER MALIGNANT NEOPLASM OF BRONCHUS AND LUNG: ICD-10-CM

## 2023-10-06 DIAGNOSIS — I48.91 UNSPECIFIED ATRIAL FIBRILLATION: ICD-10-CM

## 2023-10-06 DIAGNOSIS — Z98.1 ARTHRODESIS STATUS: ICD-10-CM

## 2023-10-06 DIAGNOSIS — I48.92 UNSPECIFIED ATRIAL FLUTTER: ICD-10-CM

## 2023-10-06 DIAGNOSIS — Z87.891 PERSONAL HISTORY OF NICOTINE DEPENDENCE: ICD-10-CM

## 2023-10-06 DIAGNOSIS — N40.0 BENIGN PROSTATIC HYPERPLASIA WITHOUT LOWER URINARY TRACT SYMPTOMS: ICD-10-CM

## 2023-10-06 DIAGNOSIS — I10 ESSENTIAL (PRIMARY) HYPERTENSION: ICD-10-CM

## 2023-10-06 DIAGNOSIS — K21.9 GASTRO-ESOPHAGEAL REFLUX DISEASE WITHOUT ESOPHAGITIS: ICD-10-CM

## 2023-10-10 ENCOUNTER — NON-APPOINTMENT (OUTPATIENT)
Age: 83
End: 2023-10-10

## 2023-10-11 ENCOUNTER — NON-APPOINTMENT (OUTPATIENT)
Age: 83
End: 2023-10-11

## 2023-10-13 PROBLEM — N63.0 UNSPECIFIED LUMP IN UNSPECIFIED BREAST: Chronic | Status: ACTIVE | Noted: 2023-09-21

## 2023-10-13 PROBLEM — G93.89 OTHER SPECIFIED DISORDERS OF BRAIN: Chronic | Status: ACTIVE | Noted: 2023-09-21

## 2023-10-13 PROBLEM — C79.31 SECONDARY MALIGNANT NEOPLASM OF BRAIN: Chronic | Status: ACTIVE | Noted: 2023-09-21

## 2023-10-13 PROBLEM — R73.03 PREDIABETES: Chronic | Status: ACTIVE | Noted: 2023-09-21

## 2023-10-16 ENCOUNTER — APPOINTMENT (OUTPATIENT)
Dept: CT IMAGING | Facility: CLINIC | Age: 83
End: 2023-10-16
Payer: MEDICARE

## 2023-10-16 ENCOUNTER — OUTPATIENT (OUTPATIENT)
Dept: OUTPATIENT SERVICES | Facility: HOSPITAL | Age: 83
LOS: 1 days | End: 2023-10-16
Payer: MEDICARE

## 2023-10-16 DIAGNOSIS — Z90.89 ACQUIRED ABSENCE OF OTHER ORGANS: Chronic | ICD-10-CM

## 2023-10-16 DIAGNOSIS — S62.101A FRACTURE OF UNSPECIFIED CARPAL BONE, RIGHT WRIST, INITIAL ENCOUNTER FOR CLOSED FRACTURE: Chronic | ICD-10-CM

## 2023-10-16 DIAGNOSIS — Z98.1 ARTHRODESIS STATUS: Chronic | ICD-10-CM

## 2023-10-16 DIAGNOSIS — Z98.890 OTHER SPECIFIED POSTPROCEDURAL STATES: Chronic | ICD-10-CM

## 2023-10-16 DIAGNOSIS — Z85.118 PERSONAL HISTORY OF OTHER MALIGNANT NEOPLASM OF BRONCHUS AND LUNG: Chronic | ICD-10-CM

## 2023-10-16 DIAGNOSIS — Z98.41 CATARACT EXTRACTION STATUS, RIGHT EYE: Chronic | ICD-10-CM

## 2023-10-16 DIAGNOSIS — Z90.49 ACQUIRED ABSENCE OF OTHER SPECIFIED PARTS OF DIGESTIVE TRACT: Chronic | ICD-10-CM

## 2023-10-16 DIAGNOSIS — Z87.19 PERSONAL HISTORY OF OTHER DISEASES OF THE DIGESTIVE SYSTEM: Chronic | ICD-10-CM

## 2023-10-16 DIAGNOSIS — Z41.9 ENCOUNTER FOR PROCEDURE FOR PURPOSES OTHER THAN REMEDYING HEALTH STATE, UNSPECIFIED: Chronic | ICD-10-CM

## 2023-10-16 DIAGNOSIS — Z01.89 ENCOUNTER FOR OTHER SPECIFIED SPECIAL EXAMINATIONS: Chronic | ICD-10-CM

## 2023-10-16 DIAGNOSIS — C15.9 MALIGNANT NEOPLASM OF ESOPHAGUS, UNSPECIFIED: ICD-10-CM

## 2023-10-16 PROCEDURE — 71260 CT THORAX DX C+: CPT

## 2023-10-16 PROCEDURE — 74177 CT ABD & PELVIS W/CONTRAST: CPT

## 2023-10-16 PROCEDURE — 74177 CT ABD & PELVIS W/CONTRAST: CPT | Mod: 26,MH

## 2023-10-16 PROCEDURE — 71260 CT THORAX DX C+: CPT | Mod: 26,MH

## 2023-10-18 ENCOUNTER — APPOINTMENT (OUTPATIENT)
Dept: HEMATOLOGY ONCOLOGY | Facility: CLINIC | Age: 83
End: 2023-10-18

## 2023-10-23 ENCOUNTER — APPOINTMENT (OUTPATIENT)
Dept: MRI IMAGING | Facility: IMAGING CENTER | Age: 83
End: 2023-10-23
Payer: MEDICARE

## 2023-10-23 ENCOUNTER — APPOINTMENT (OUTPATIENT)
Dept: NEUROLOGY | Facility: CLINIC | Age: 83
End: 2023-10-23
Payer: MEDICARE

## 2023-10-23 ENCOUNTER — OUTPATIENT (OUTPATIENT)
Dept: OUTPATIENT SERVICES | Facility: HOSPITAL | Age: 83
LOS: 1 days | End: 2023-10-23
Payer: MEDICARE

## 2023-10-23 VITALS
SYSTOLIC BLOOD PRESSURE: 152 MMHG | WEIGHT: 160 LBS | HEART RATE: 73 BPM | RESPIRATION RATE: 16 BRPM | BODY MASS INDEX: 25.11 KG/M2 | HEIGHT: 67 IN | DIASTOLIC BLOOD PRESSURE: 84 MMHG | OXYGEN SATURATION: 97 %

## 2023-10-23 DIAGNOSIS — Z98.890 OTHER SPECIFIED POSTPROCEDURAL STATES: Chronic | ICD-10-CM

## 2023-10-23 DIAGNOSIS — S62.101A FRACTURE OF UNSPECIFIED CARPAL BONE, RIGHT WRIST, INITIAL ENCOUNTER FOR CLOSED FRACTURE: Chronic | ICD-10-CM

## 2023-10-23 DIAGNOSIS — Z85.118 PERSONAL HISTORY OF OTHER MALIGNANT NEOPLASM OF BRONCHUS AND LUNG: Chronic | ICD-10-CM

## 2023-10-23 DIAGNOSIS — Z41.9 ENCOUNTER FOR PROCEDURE FOR PURPOSES OTHER THAN REMEDYING HEALTH STATE, UNSPECIFIED: Chronic | ICD-10-CM

## 2023-10-23 DIAGNOSIS — Z87.19 PERSONAL HISTORY OF OTHER DISEASES OF THE DIGESTIVE SYSTEM: Chronic | ICD-10-CM

## 2023-10-23 DIAGNOSIS — Z98.1 ARTHRODESIS STATUS: Chronic | ICD-10-CM

## 2023-10-23 DIAGNOSIS — Z90.49 ACQUIRED ABSENCE OF OTHER SPECIFIED PARTS OF DIGESTIVE TRACT: Chronic | ICD-10-CM

## 2023-10-23 DIAGNOSIS — Z90.89 ACQUIRED ABSENCE OF OTHER ORGANS: Chronic | ICD-10-CM

## 2023-10-23 DIAGNOSIS — Z01.89 ENCOUNTER FOR OTHER SPECIFIED SPECIAL EXAMINATIONS: Chronic | ICD-10-CM

## 2023-10-23 DIAGNOSIS — Z98.41 CATARACT EXTRACTION STATUS, RIGHT EYE: Chronic | ICD-10-CM

## 2023-10-23 DIAGNOSIS — C79.31 SECONDARY MALIGNANT NEOPLASM OF BRAIN: ICD-10-CM

## 2023-10-23 PROCEDURE — 70553 MRI BRAIN STEM W/O & W/DYE: CPT

## 2023-10-23 PROCEDURE — A9585: CPT

## 2023-10-23 PROCEDURE — 70553 MRI BRAIN STEM W/O & W/DYE: CPT | Mod: 26,MH

## 2023-10-23 PROCEDURE — 99214 OFFICE O/P EST MOD 30 MIN: CPT

## 2023-10-23 RX ORDER — HYOSCYAMINE SULFATE 0.12 MG/1
0.12 TABLET ORAL
Refills: 0 | Status: DISCONTINUED | COMMUNITY
End: 2023-10-23

## 2023-11-23 ENCOUNTER — NON-APPOINTMENT (OUTPATIENT)
Age: 83
End: 2023-11-23

## 2023-12-12 ENCOUNTER — OUTPATIENT (OUTPATIENT)
Dept: OUTPATIENT SERVICES | Facility: HOSPITAL | Age: 83
LOS: 1 days | Discharge: HOME | End: 2023-12-12

## 2023-12-12 DIAGNOSIS — Z98.890 OTHER SPECIFIED POSTPROCEDURAL STATES: Chronic | ICD-10-CM

## 2023-12-12 DIAGNOSIS — Z87.19 PERSONAL HISTORY OF OTHER DISEASES OF THE DIGESTIVE SYSTEM: Chronic | ICD-10-CM

## 2023-12-12 DIAGNOSIS — Z98.1 ARTHRODESIS STATUS: Chronic | ICD-10-CM

## 2023-12-12 DIAGNOSIS — Z90.49 ACQUIRED ABSENCE OF OTHER SPECIFIED PARTS OF DIGESTIVE TRACT: Chronic | ICD-10-CM

## 2023-12-12 DIAGNOSIS — Z85.118 PERSONAL HISTORY OF OTHER MALIGNANT NEOPLASM OF BRONCHUS AND LUNG: Chronic | ICD-10-CM

## 2023-12-12 DIAGNOSIS — Z01.89 ENCOUNTER FOR OTHER SPECIFIED SPECIAL EXAMINATIONS: Chronic | ICD-10-CM

## 2023-12-12 DIAGNOSIS — Z90.89 ACQUIRED ABSENCE OF OTHER ORGANS: Chronic | ICD-10-CM

## 2023-12-12 DIAGNOSIS — C15.9 MALIGNANT NEOPLASM OF ESOPHAGUS, UNSPECIFIED: ICD-10-CM

## 2023-12-12 DIAGNOSIS — S62.101A FRACTURE OF UNSPECIFIED CARPAL BONE, RIGHT WRIST, INITIAL ENCOUNTER FOR CLOSED FRACTURE: Chronic | ICD-10-CM

## 2023-12-12 DIAGNOSIS — Z41.9 ENCOUNTER FOR PROCEDURE FOR PURPOSES OTHER THAN REMEDYING HEALTH STATE, UNSPECIFIED: Chronic | ICD-10-CM

## 2023-12-12 DIAGNOSIS — Z98.41 CATARACT EXTRACTION STATUS, RIGHT EYE: Chronic | ICD-10-CM

## 2023-12-13 ENCOUNTER — APPOINTMENT (OUTPATIENT)
Dept: HEMATOLOGY ONCOLOGY | Facility: CLINIC | Age: 83
End: 2023-12-13
Payer: MEDICARE

## 2023-12-13 VITALS
RESPIRATION RATE: 16 BRPM | DIASTOLIC BLOOD PRESSURE: 67 MMHG | HEART RATE: 79 BPM | OXYGEN SATURATION: 98 % | WEIGHT: 167 LBS | HEIGHT: 67 IN | TEMPERATURE: 97.7 F | BODY MASS INDEX: 26.21 KG/M2 | SYSTOLIC BLOOD PRESSURE: 117 MMHG

## 2023-12-13 DIAGNOSIS — C78.00 SECONDARY MALIGNANT NEOPLASM OF UNSPECIFIED LUNG: ICD-10-CM

## 2023-12-13 PROCEDURE — 99214 OFFICE O/P EST MOD 30 MIN: CPT

## 2023-12-13 NOTE — ASSESSMENT
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 S/p neoadjuvant chemoRT ( taxol/ carboplatin), s/p R0 surgical resection on 11/11/15 for residual T2, N2 disease. S/p 2 additional cycles of systemic chemotherapy with Taxol and carboplatin. In 2017 developed low volume multiple pulmonary metastases- s/p wedge resection. S/p " second adjuvant" Xeloda/ Oxaliplatin- completed in February 2018. Remained YURY for five years  April 2022 diagnosed with large solitary metastasis to right occipital lobe s/p surgical resection. Unusual course of his disease with CNS relapse seven years after initial diagnosis. S/p STS to site of brain met.  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 Evaluated by Dr Rollins and bevacizumab recommended.  S/p helga x 4 cycles 6/7/23- 8/9/23  MRI brain 8/22/23 showed improvement. Continue to monitor clinically and scans. Most recent scans in Oct 2023- YURY.  Follow up Brain MRI perfusion in  January - per Dr Rollins.  CT CAP with contrast in April.  Return visit in April- after scans/ sooner PRN

## 2023-12-13 NOTE — HISTORY OF PRESENT ILLNESS
[Disease: _____________________] : Disease: [unfilled] [de-identified] : Presented in 2015 with chest/ epigastric pain.EGD on 7/24/15 showed malignant ulcerated mass in lower third of esophagus. Biopsy c/w infiltrating moderately differentiated adenocarcinoma. EUS :  T3 N2 nonobstructing distal esophageal cancer . PET/CT no evidence of distant mets. S/p preoperative chemoRT with Taxol/ Carboplatin. On 11/11/15 he underwent left  VATS esophagogastrectomy with abdominal lymphadenectomy at [facility].   SUrgical pathology showed  residual 2.5 mm single focus  of moderately differentiated  adenocarcinoma invading muscularis propria, three  of 27 LN with metastatic disease.  Received 2 additional cycles of systemic adjuvant chemotherapy with Taxol and carboplatin- completed 2/19/16.  Surveillance scans showed tiny bilateral  pulmonary nodules. Right lung nodules were stable but left lung nodules- enlarges on follow up scans. On 8/25/17 he underwent wedge resection of two left lung pulmonary nodules ( Dr Luis Antonio [facility])   Pathology showed metastatic adenocarcinoma compatible with esophageal primary: 9 mm and 8 mm tumors with necrosis.   S/p " second adjuvant " chemotherapy ( minimal residual disease on scans )- Xeloda/ Oxaliplatin- Sept 2017- Feb 2018  Oxaliplatin stopped early December ( after 5 cycles) due to neuropathy.     CT CAP 9/29/21  : YURY  In 2022 developed left sided visual field loss.  MRI of brain with sabine 4/15/22 : large mixed cystic solid mass R parietal 4 x 3.5 x 2.8 cm with extensive edema CT CAP with contrast 4/15/22 no evidence of metastatic disease  s/p partial resection of R occipital mass 4/19/22 ( Dr Novoa) : metastatic  poorly diff adenocarcinoma with necrosis c/w patient's history of esophageal carcinoma.  F/u MRI  residual enhancing neoplasm noted superiorly. SRS to brain 6/13/22 ( 2700 cGy )- Dr Resendiz MRI brain Nov 2022- continuous improvement. No new lesions.   MR head 3/15/23  postop changes  , increase in peripherally enhancing lesion- treatment related changes versus POD . reviewed by Dr Rollins- c/w radiation necrosis. CT CAP 4/4/23 YURY   Seen by  neurooncology  Dr Rollins.   6/7/23 - 8/9/23  helga 7.5 mg/kg q 21 days x 4 cycles for radiation necrosis   MRI  brain 8/22/23  IMPRESSION: When compared with prior 5/16/2023,  lesion has decreased CBV and decreased CBF on perfusion more consistent with treatment-related changes. Diffusion hyperintensity with restricted diffusion on the ADC map in association with the lesion likely related to lesional devascularization.  Monitored. MRI brain with perfusion Oct 2023 stable CT CAP Oct 2023- no evidence of metastatic disease  [de-identified] : moderately differentiated adenocarcinoma [de-identified] : HER 2 negative [de-identified] : endoscopic staging T3, N2\par  surgical staging after neoadjuvant treatment : T2, N2  ( 3/27) [de-identified] : Had spine surgery  lumbar laminectomy in October 2023. recovered OK. Walks OK on shorter distances.

## 2023-12-13 NOTE — RESULTS/DATA
[FreeTextEntry1] : CT CAP 10/16/23 no evidence of metastatic disease  MR head perfusion 10/23/23  Grossly stable appearance of an enhancing lesion anterior to the right occipital resection cavity with perfusion findings favoring predominantly treatment-related changes.

## 2023-12-13 NOTE — REVIEW OF SYSTEMS
[Patient Intake Form Reviewed] : Patient intake form was reviewed [Negative] : Endocrine [FreeTextEntry9] : per HPI  [de-identified] : per HPI

## 2023-12-13 NOTE — PHYSICAL EXAM
[Restricted in physically strenuous activity but ambulatory and able to carry out work of a light or sedentary nature] : Status 1- Restricted in physically strenuous activity but ambulatory and able to carry out work of a light or sedentary nature, e.g., light house work, office work [Normal] : grossly intact [de-identified] : walks without walker  [de-identified] : has persistent L peripheral visual field deficit

## 2023-12-14 DIAGNOSIS — C79.31 SECONDARY MALIGNANT NEOPLASM OF BRAIN: ICD-10-CM

## 2023-12-14 DIAGNOSIS — C78.00 SECONDARY MALIGNANT NEOPLASM OF UNSPECIFIED LUNG: ICD-10-CM

## 2023-12-30 NOTE — ASSESSMENT
[FreeTextEntry1] : 82 yo male with hx of BPH. For LUTS, pt is not bothered and can observe. RTO in 1 year or sooner PRN yes

## 2024-01-01 NOTE — ED STATDOCS - NS ED MD DISPO DISCHARGE CCDA
Shruthi is growing well and has normal development.  Make sure she is sleeping on her back and alone in a crib or bassinet to reduce the risk of SIDS.  Make sure your car seat is firmly placed in the car rear facing and at the correct angle per its directions.  Try to do supervised tummy time at least once a day.  Nursing infants should take a vitamin D supplement over the counter at a dose of 400 units/day.  Check the vitamin label for the amount as the formulations vary.      Start moisturizing skin from head to toe twice a day. Recent studies show it can reduce risk of future eczema by keeping the skin barrier healthy!     Patient/Caregiver provided printed discharge information.

## 2024-01-24 ENCOUNTER — APPOINTMENT (OUTPATIENT)
Dept: MRI IMAGING | Facility: IMAGING CENTER | Age: 84
End: 2024-01-24

## 2024-01-24 ENCOUNTER — APPOINTMENT (OUTPATIENT)
Dept: NEUROLOGY | Facility: CLINIC | Age: 84
End: 2024-01-24

## 2024-01-29 ENCOUNTER — APPOINTMENT (OUTPATIENT)
Dept: GASTROENTEROLOGY | Facility: CLINIC | Age: 84
End: 2024-01-29
Payer: MEDICARE

## 2024-01-29 VITALS
SYSTOLIC BLOOD PRESSURE: 138 MMHG | WEIGHT: 160 LBS | DIASTOLIC BLOOD PRESSURE: 80 MMHG | HEIGHT: 67 IN | BODY MASS INDEX: 25.11 KG/M2

## 2024-01-29 DIAGNOSIS — Z12.11 ENCOUNTER FOR SCREENING FOR MALIGNANT NEOPLASM OF COLON: ICD-10-CM

## 2024-01-29 DIAGNOSIS — Z76.0 ENCOUNTER FOR ISSUE OF REPEAT PRESCRIPTION: ICD-10-CM

## 2024-01-29 DIAGNOSIS — K21.9 GASTRO-ESOPHAGEAL REFLUX DISEASE W/OUT ESOPHAGITIS: ICD-10-CM

## 2024-01-29 PROCEDURE — 99213 OFFICE O/P EST LOW 20 MIN: CPT

## 2024-01-29 RX ORDER — DICYCLOMINE HYDROCHLORIDE 10 MG/1
10 CAPSULE ORAL 3 TIMES DAILY
Qty: 270 | Refills: 0 | Status: ACTIVE | COMMUNITY
Start: 2018-09-07 | End: 1900-01-01

## 2024-01-29 NOTE — PHYSICAL EXAM
[Alert] : alert [Healthy Appearing] : healthy appearing [Sclera] : the sclera and conjunctiva were normal [Hearing Threshold Finger Rub Not [place]] : hearing was normal [Normal Appearance] : the appearance of the neck was normal [No Respiratory Distress] : no respiratory distress [Auscultation Breath Sounds / Voice Sounds] : lungs were clear to auscultation bilaterally [Heart Rate And Rhythm] : heart rate was normal and rhythm regular [Bowel Sounds] : normal bowel sounds [Abdomen Tenderness] : non-tender [Abdomen Soft] : soft [Abnormal Walk] : normal gait [Normal Color / Pigmentation] : normal skin color and pigmentation [Oriented To Time, Place, And Person] : oriented to person, place, and time

## 2024-01-29 NOTE — ASSESSMENT
[FreeTextEntry1] : Plan: Since pt has no symptoms, no need for interventions. Would recommend cologuard for screening purposes, discussed possibility of false positive. Pt agreeable, all questions answered.

## 2024-01-29 NOTE — HISTORY OF PRESENT ILLNESS
[FreeTextEntry1] : Chang Gandara is a 83 year old male presenting today for routine follow up visit. Pt has hx of esophageal cancer, s/p resection in remission. Denies any GERD symptoms, nausea, vomiting, dysphagia. Last EGD was in 2022. Denies any bowel complaints, typically has bowel habits daily without significant straining or overt bleeding such as melena or hematochezia. Unsure when his last colonoscopy was, denies FMH of CRC.

## 2024-02-02 ENCOUNTER — OUTPATIENT (OUTPATIENT)
Dept: OUTPATIENT SERVICES | Facility: HOSPITAL | Age: 84
LOS: 1 days | End: 2024-02-02
Payer: MEDICARE

## 2024-02-02 ENCOUNTER — APPOINTMENT (OUTPATIENT)
Dept: MRI IMAGING | Facility: CLINIC | Age: 84
End: 2024-02-02
Payer: MEDICARE

## 2024-02-02 DIAGNOSIS — Z98.890 OTHER SPECIFIED POSTPROCEDURAL STATES: Chronic | ICD-10-CM

## 2024-02-02 DIAGNOSIS — Z87.19 PERSONAL HISTORY OF OTHER DISEASES OF THE DIGESTIVE SYSTEM: Chronic | ICD-10-CM

## 2024-02-02 DIAGNOSIS — S62.101A FRACTURE OF UNSPECIFIED CARPAL BONE, RIGHT WRIST, INITIAL ENCOUNTER FOR CLOSED FRACTURE: Chronic | ICD-10-CM

## 2024-02-02 DIAGNOSIS — Z01.89 ENCOUNTER FOR OTHER SPECIFIED SPECIAL EXAMINATIONS: Chronic | ICD-10-CM

## 2024-02-02 DIAGNOSIS — C79.31 SECONDARY MALIGNANT NEOPLASM OF BRAIN: ICD-10-CM

## 2024-02-02 DIAGNOSIS — Z85.118 PERSONAL HISTORY OF OTHER MALIGNANT NEOPLASM OF BRONCHUS AND LUNG: Chronic | ICD-10-CM

## 2024-02-02 DIAGNOSIS — Z90.49 ACQUIRED ABSENCE OF OTHER SPECIFIED PARTS OF DIGESTIVE TRACT: Chronic | ICD-10-CM

## 2024-02-02 DIAGNOSIS — Z90.89 ACQUIRED ABSENCE OF OTHER ORGANS: Chronic | ICD-10-CM

## 2024-02-02 DIAGNOSIS — Z98.41 CATARACT EXTRACTION STATUS, RIGHT EYE: Chronic | ICD-10-CM

## 2024-02-02 DIAGNOSIS — Z98.1 ARTHRODESIS STATUS: Chronic | ICD-10-CM

## 2024-02-02 DIAGNOSIS — Z41.9 ENCOUNTER FOR PROCEDURE FOR PURPOSES OTHER THAN REMEDYING HEALTH STATE, UNSPECIFIED: Chronic | ICD-10-CM

## 2024-02-02 PROCEDURE — 70553 MRI BRAIN STEM W/O & W/DYE: CPT

## 2024-02-02 PROCEDURE — 70553 MRI BRAIN STEM W/O & W/DYE: CPT | Mod: 26,MH

## 2024-02-02 PROCEDURE — A9585: CPT

## 2024-02-06 ENCOUNTER — APPOINTMENT (OUTPATIENT)
Dept: NEUROLOGY | Facility: CLINIC | Age: 84
End: 2024-02-06
Payer: MEDICARE

## 2024-02-06 PROCEDURE — 99214 OFFICE O/P EST MOD 30 MIN: CPT

## 2024-02-06 NOTE — DISCUSSION/SUMMARY
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 Changes on scan are asymptomatic and bear watching.  We discussed follow up imaging.    RADIATION NECROSIS: Hold VADIM New MRI Perfusion in 3mo rtc 3mo by CARI

## 2024-02-06 NOTE — PHYSICAL EXAM
[FreeTextEntry1] : Limited by TEB  Well appearing man Alert and oriented x3, able to provide history, attentive, not aphasic to naming/rep/commands, memory intact CN with EOMI, normal facial movement, normal tongue extrusion Motor no drift, full movement in all extremities Gait normal

## 2024-02-06 NOTE — DATA REVIEWED
[de-identified] : Serial MRI personally reviewed including 2/24 compared with 10/23, 8/23, 5/23, 3/23, 11/22, 8/22, 6/22, and 4/22, there is more amorphous enhancement and stable edema in right occipital lobe, and no new lesions, no hyperperfusion  reviewed with neuroradiology by phone today
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
## 2024-02-06 NOTE — REASON FOR VISIT
[Home] : at home, [unfilled] , at the time of the visit. [Medical Office: ([facility])___] : at the medical office located in  [Patient] : the patient [Self] : self

## 2024-04-15 ENCOUNTER — APPOINTMENT (OUTPATIENT)
Dept: CT IMAGING | Facility: CLINIC | Age: 84
End: 2024-04-15
Payer: MEDICARE

## 2024-04-15 ENCOUNTER — APPOINTMENT (OUTPATIENT)
Dept: MRI IMAGING | Facility: CLINIC | Age: 84
End: 2024-04-15
Payer: MEDICARE

## 2024-04-15 ENCOUNTER — OUTPATIENT (OUTPATIENT)
Dept: OUTPATIENT SERVICES | Facility: HOSPITAL | Age: 84
LOS: 1 days | End: 2024-04-15
Payer: MEDICARE

## 2024-04-15 DIAGNOSIS — Z98.890 OTHER SPECIFIED POSTPROCEDURAL STATES: Chronic | ICD-10-CM

## 2024-04-15 DIAGNOSIS — Z98.41 CATARACT EXTRACTION STATUS, RIGHT EYE: Chronic | ICD-10-CM

## 2024-04-15 DIAGNOSIS — Z41.9 ENCOUNTER FOR PROCEDURE FOR PURPOSES OTHER THAN REMEDYING HEALTH STATE, UNSPECIFIED: Chronic | ICD-10-CM

## 2024-04-15 DIAGNOSIS — Z87.19 PERSONAL HISTORY OF OTHER DISEASES OF THE DIGESTIVE SYSTEM: Chronic | ICD-10-CM

## 2024-04-15 DIAGNOSIS — Z98.1 ARTHRODESIS STATUS: Chronic | ICD-10-CM

## 2024-04-15 DIAGNOSIS — C15.9 MALIGNANT NEOPLASM OF ESOPHAGUS, UNSPECIFIED: ICD-10-CM

## 2024-04-15 DIAGNOSIS — S62.101A FRACTURE OF UNSPECIFIED CARPAL BONE, RIGHT WRIST, INITIAL ENCOUNTER FOR CLOSED FRACTURE: Chronic | ICD-10-CM

## 2024-04-15 DIAGNOSIS — Z90.49 ACQUIRED ABSENCE OF OTHER SPECIFIED PARTS OF DIGESTIVE TRACT: Chronic | ICD-10-CM

## 2024-04-15 DIAGNOSIS — Z01.89 ENCOUNTER FOR OTHER SPECIFIED SPECIAL EXAMINATIONS: Chronic | ICD-10-CM

## 2024-04-15 DIAGNOSIS — C79.31 SECONDARY MALIGNANT NEOPLASM OF BRAIN: ICD-10-CM

## 2024-04-15 DIAGNOSIS — Z90.89 ACQUIRED ABSENCE OF OTHER ORGANS: Chronic | ICD-10-CM

## 2024-04-15 DIAGNOSIS — Z85.118 PERSONAL HISTORY OF OTHER MALIGNANT NEOPLASM OF BRONCHUS AND LUNG: Chronic | ICD-10-CM

## 2024-04-15 PROCEDURE — 74177 CT ABD & PELVIS W/CONTRAST: CPT

## 2024-04-15 PROCEDURE — A9585: CPT

## 2024-04-15 PROCEDURE — 71260 CT THORAX DX C+: CPT

## 2024-04-15 PROCEDURE — 70553 MRI BRAIN STEM W/O & W/DYE: CPT

## 2024-04-15 PROCEDURE — 71260 CT THORAX DX C+: CPT | Mod: 26,MH

## 2024-04-15 PROCEDURE — 70553 MRI BRAIN STEM W/O & W/DYE: CPT | Mod: 26,MH

## 2024-04-15 PROCEDURE — 74177 CT ABD & PELVIS W/CONTRAST: CPT | Mod: 26,MH

## 2024-04-22 ENCOUNTER — OUTPATIENT (OUTPATIENT)
Dept: OUTPATIENT SERVICES | Facility: HOSPITAL | Age: 84
LOS: 1 days | Discharge: HOME | End: 2024-04-22

## 2024-04-22 DIAGNOSIS — Z98.890 OTHER SPECIFIED POSTPROCEDURAL STATES: Chronic | ICD-10-CM

## 2024-04-22 DIAGNOSIS — Z98.41 CATARACT EXTRACTION STATUS, RIGHT EYE: Chronic | ICD-10-CM

## 2024-04-22 DIAGNOSIS — Z98.1 ARTHRODESIS STATUS: Chronic | ICD-10-CM

## 2024-04-22 DIAGNOSIS — Z90.89 ACQUIRED ABSENCE OF OTHER ORGANS: Chronic | ICD-10-CM

## 2024-04-22 DIAGNOSIS — C15.9 MALIGNANT NEOPLASM OF ESOPHAGUS, UNSPECIFIED: ICD-10-CM

## 2024-04-22 DIAGNOSIS — Z41.9 ENCOUNTER FOR PROCEDURE FOR PURPOSES OTHER THAN REMEDYING HEALTH STATE, UNSPECIFIED: Chronic | ICD-10-CM

## 2024-04-22 DIAGNOSIS — Z01.89 ENCOUNTER FOR OTHER SPECIFIED SPECIAL EXAMINATIONS: Chronic | ICD-10-CM

## 2024-04-22 DIAGNOSIS — S62.101A FRACTURE OF UNSPECIFIED CARPAL BONE, RIGHT WRIST, INITIAL ENCOUNTER FOR CLOSED FRACTURE: Chronic | ICD-10-CM

## 2024-04-22 DIAGNOSIS — Z87.19 PERSONAL HISTORY OF OTHER DISEASES OF THE DIGESTIVE SYSTEM: Chronic | ICD-10-CM

## 2024-04-22 DIAGNOSIS — Z90.49 ACQUIRED ABSENCE OF OTHER SPECIFIED PARTS OF DIGESTIVE TRACT: Chronic | ICD-10-CM

## 2024-04-22 DIAGNOSIS — Z85.118 PERSONAL HISTORY OF OTHER MALIGNANT NEOPLASM OF BRONCHUS AND LUNG: Chronic | ICD-10-CM

## 2024-04-24 ENCOUNTER — APPOINTMENT (OUTPATIENT)
Dept: HEMATOLOGY ONCOLOGY | Facility: CLINIC | Age: 84
End: 2024-04-24
Payer: MEDICARE

## 2024-04-24 VITALS
BODY MASS INDEX: 28.09 KG/M2 | HEIGHT: 67 IN | WEIGHT: 179 LBS | SYSTOLIC BLOOD PRESSURE: 113 MMHG | DIASTOLIC BLOOD PRESSURE: 67 MMHG | OXYGEN SATURATION: 97 % | HEART RATE: 87 BPM | TEMPERATURE: 97.9 F

## 2024-04-24 DIAGNOSIS — C15.9 MALIGNANT NEOPLASM OF ESOPHAGUS, UNSPECIFIED: ICD-10-CM

## 2024-04-24 PROCEDURE — 99214 OFFICE O/P EST MOD 30 MIN: CPT

## 2024-04-24 NOTE — ASSESSMENT
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 S/p neoadjuvant chemoRT ( taxol/ carboplatin), s/p R0 surgical resection on 11/11/15 for residual T2, N2 disease. S/p 2 additional cycles of systemic chemotherapy with Taxol and carboplatin. In 2017 developed low volume multiple pulmonary metastases- s/p wedge resection. S/p " second adjuvant" Xeloda/ Oxaliplatin- completed in February 2018. Remained YURY for five years  April 2022 diagnosed with large solitary metastasis to right occipital lobe s/p surgical resection. Unusual course of his disease with CNS relapse seven years after initial diagnosis. S/p STS to site of brain met.  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 Evaluated by Dr Rollins and bevacizumab recommended.  S/p helga x 4 cycles 6/7/23- 8/9/23  MRI brain 8/22/23 showed improvement.  Most recent scans  chest abd pelvis in April 2024 - YURY.  Follow up Brain MRI Aoril 2024- showed progression of tumor in R peritooccipital region versus worsening RT necrosis. patient is symptomatic. D/w Dr Rollins- will evaluate patient next week- decide- resection of brain mass versus another cycle of avastin ( although he did not seem to ahve any significant improvement  after prior avastin treatment)

## 2024-04-24 NOTE — RESULTS/DATA
[FreeTextEntry1] : CT CAP 4/15/24  YURY  MRI head with / without iv contrast 4/15/24 Increase in size and thickness of enhancement of previously identified right parieto-occipital bilobed lesion with hyperperfusion noted particularly medially. Findings favor progression of disease over treatment related changes.

## 2024-04-24 NOTE — PHYSICAL EXAM
[Normal] : grossly intact [de-identified] : walks OK without walker  [de-identified] : has persistent L peripheral visual field deficit

## 2024-04-24 NOTE — REVIEW OF SYSTEMS
[Patient Intake Form Reviewed] : Patient intake form was reviewed [Vision Problems] : vision problems [Diarrhea: Grade 0] : Diarrhea: Grade 0 [Negative] : Allergic/Immunologic [FreeTextEntry3] : per HPI  [FreeTextEntry9] : back pain  chronic

## 2024-04-24 NOTE — HISTORY OF PRESENT ILLNESS
[Disease: _____________________] : Disease: [unfilled] [de-identified] : Presented in 2015 with chest/ epigastric pain.EGD on 7/24/15 showed malignant ulcerated mass in lower third of esophagus. Biopsy c/w infiltrating moderately differentiated adenocarcinoma. EUS :  T3 N2 nonobstructing distal esophageal cancer . PET/CT no evidence of distant mets. S/p preoperative chemoRT with Taxol/ Carboplatin. On 11/11/15 he underwent left  VATS esophagogastrectomy with abdominal lymphadenectomy at [facility].   SUrgical pathology showed  residual 2.5 mm single focus  of moderately differentiated  adenocarcinoma invading muscularis propria, three  of 27 LN with metastatic disease.  Received 2 additional cycles of systemic adjuvant chemotherapy with Taxol and carboplatin- completed 2/19/16.  Surveillance scans showed tiny bilateral  pulmonary nodules. Right lung nodules were stable but left lung nodules- enlarges on follow up scans. On 8/25/17 he underwent wedge resection of two left lung pulmonary nodules ( Dr Luis Antonio [facility])   Pathology showed metastatic adenocarcinoma compatible with esophageal primary: 9 mm and 8 mm tumors with necrosis.   S/p " second adjuvant " chemotherapy ( minimal residual disease on scans )- Xeloda/ Oxaliplatin- Sept 2017- Feb 2018  Oxaliplatin stopped early December ( after 5 cycles) due to neuropathy.     CT CAP 9/29/21  : YURY  In 2022 developed left sided visual field loss.  MRI of brain with sabine 4/15/22 : large mixed cystic solid mass R parietal 4 x 3.5 x 2.8 cm with extensive edema CT CAP with contrast 4/15/22 no evidence of metastatic disease  s/p partial resection of R occipital mass 4/19/22 ( Dr Novoa) : metastatic  poorly diff adenocarcinoma with necrosis c/w patient's history of esophageal carcinoma.  F/u MRI  residual enhancing neoplasm noted superiorly. SRS to brain 6/13/22 ( 2700 cGy )- Dr Resendiz MRI brain Nov 2022- continuous improvement. No new lesions.   MR head 3/15/23  postop changes  , increase in peripherally enhancing lesion- treatment related changes versus POD . reviewed by Dr Rollins- c/w radiation necrosis. CT CAP 4/4/23 YURY   Seen by  neurooncology  Dr Rollins.   6/7/23 - 8/9/23  helga 7.5 mg/kg q 21 days x 4 cycles for radiation necrosis   MRI  brain 8/22/23  IMPRESSION: When compared with prior 5/16/2023,  lesion has decreased CBV and decreased CBF on perfusion more consistent with treatment-related changes. Diffusion hyperintensity with restricted diffusion on the ADC map in association with the lesion likely related to lesional devascularization.  Monitored. MRI brain with perfusion Oct 2023 stable. MRI brain 4/15/24 report : increase in thickness and enhancement of R parietooccipital lesion : concern for POD versus treatment related changes.  CT CAP April 2024- no evidence of metastatic disease  [de-identified] : moderately differentiated adenocarcinoma [de-identified] : HER 2 negative [de-identified] : endoscopic staging T3, N2\par  surgical staging after neoadjuvant treatment : T2, N2  ( 3/27) [de-identified] : Noticed  that vision getting worse- peripheral vision deficit is worse ( had only very slight and transient improvement after avastin) and overall vision in both eyes is worse. Saw his eye doctor recently and told that nothing is wrong with his eyes, Back pain - chronic- did not improve much after surgery last year ( had lumbar laminectomy) .

## 2024-04-25 ENCOUNTER — APPOINTMENT (OUTPATIENT)
Dept: NEUROLOGY | Facility: CLINIC | Age: 84
End: 2024-04-25

## 2024-04-25 DIAGNOSIS — C79.31 SECONDARY MALIGNANT NEOPLASM OF BRAIN: ICD-10-CM

## 2024-05-02 ENCOUNTER — APPOINTMENT (OUTPATIENT)
Dept: NEUROLOGY | Facility: CLINIC | Age: 84
End: 2024-05-02
Payer: MEDICARE

## 2024-05-02 DIAGNOSIS — H53.9 UNSPECIFIED VISUAL DISTURBANCE: ICD-10-CM

## 2024-05-02 PROCEDURE — 99215 OFFICE O/P EST HI 40 MIN: CPT
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
## 2024-05-02 NOTE — DISCUSSION/SUMMARY
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 Changes on scan progressive and possibly more symptomatic.  I discussed options including VADIM and possible resection.   Will discuss at C Possible resection vs VADIM

## 2024-05-02 NOTE — DATA REVIEWED
[de-identified] : Serial MRI personally reviewed including 4/24, 2/24 compared with 10/23, 8/23, 5/23, 3/23, 11/22, 8/22, 6/22, and 4/22, there is progressive amorphous enhancement with more solid medial component, with some hyperperfusion and more edema  reviewed with neuroradiology by phone today

## 2024-05-07 ENCOUNTER — NON-APPOINTMENT (OUTPATIENT)
Age: 84
End: 2024-05-07

## 2024-05-15 ENCOUNTER — OUTPATIENT (OUTPATIENT)
Dept: OUTPATIENT SERVICES | Facility: HOSPITAL | Age: 84
LOS: 1 days | End: 2024-05-15
Payer: MEDICARE

## 2024-05-15 VITALS
OXYGEN SATURATION: 99 % | DIASTOLIC BLOOD PRESSURE: 86 MMHG | TEMPERATURE: 98 F | RESPIRATION RATE: 16 BRPM | HEART RATE: 71 BPM | WEIGHT: 176.37 LBS | SYSTOLIC BLOOD PRESSURE: 165 MMHG | HEIGHT: 66 IN

## 2024-05-15 DIAGNOSIS — Z85.118 PERSONAL HISTORY OF OTHER MALIGNANT NEOPLASM OF BRONCHUS AND LUNG: Chronic | ICD-10-CM

## 2024-05-15 DIAGNOSIS — Z90.49 ACQUIRED ABSENCE OF OTHER SPECIFIED PARTS OF DIGESTIVE TRACT: Chronic | ICD-10-CM

## 2024-05-15 DIAGNOSIS — S62.101A FRACTURE OF UNSPECIFIED CARPAL BONE, RIGHT WRIST, INITIAL ENCOUNTER FOR CLOSED FRACTURE: Chronic | ICD-10-CM

## 2024-05-15 DIAGNOSIS — C79.31 SECONDARY MALIGNANT NEOPLASM OF BRAIN: ICD-10-CM

## 2024-05-15 DIAGNOSIS — Z98.890 OTHER SPECIFIED POSTPROCEDURAL STATES: Chronic | ICD-10-CM

## 2024-05-15 DIAGNOSIS — Z01.818 ENCOUNTER FOR OTHER PREPROCEDURAL EXAMINATION: ICD-10-CM

## 2024-05-15 DIAGNOSIS — Z29.9 ENCOUNTER FOR PROPHYLACTIC MEASURES, UNSPECIFIED: ICD-10-CM

## 2024-05-15 DIAGNOSIS — Z41.9 ENCOUNTER FOR PROCEDURE FOR PURPOSES OTHER THAN REMEDYING HEALTH STATE, UNSPECIFIED: Chronic | ICD-10-CM

## 2024-05-15 DIAGNOSIS — Z98.1 ARTHRODESIS STATUS: Chronic | ICD-10-CM

## 2024-05-15 DIAGNOSIS — Z01.89 ENCOUNTER FOR OTHER SPECIFIED SPECIAL EXAMINATIONS: Chronic | ICD-10-CM

## 2024-05-15 DIAGNOSIS — Z87.19 PERSONAL HISTORY OF OTHER DISEASES OF THE DIGESTIVE SYSTEM: Chronic | ICD-10-CM

## 2024-05-15 DIAGNOSIS — Z98.41 CATARACT EXTRACTION STATUS, RIGHT EYE: Chronic | ICD-10-CM

## 2024-05-15 DIAGNOSIS — Z90.89 ACQUIRED ABSENCE OF OTHER ORGANS: Chronic | ICD-10-CM

## 2024-05-15 LAB
ALBUMIN SERPL ELPH-MCNC: 3.8 G/DL — SIGNIFICANT CHANGE UP (ref 3.3–5)
ANION GAP SERPL CALC-SCNC: 3 MMOL/L — LOW (ref 5–17)
APPEARANCE UR: CLEAR — SIGNIFICANT CHANGE UP
APTT BLD: 35.7 SEC — HIGH (ref 24.5–35.6)
BASOPHILS # BLD AUTO: 0.05 K/UL — SIGNIFICANT CHANGE UP (ref 0–0.2)
BASOPHILS NFR BLD AUTO: 0.9 % — SIGNIFICANT CHANGE UP (ref 0–2)
BILIRUB UR-MCNC: NEGATIVE — SIGNIFICANT CHANGE UP
BLD GP AB SCN SERPL QL: SIGNIFICANT CHANGE UP
BUN SERPL-MCNC: 25 MG/DL — HIGH (ref 7–23)
CALCIUM SERPL-MCNC: 9.5 MG/DL — SIGNIFICANT CHANGE UP (ref 8.5–10.1)
CHLORIDE SERPL-SCNC: 109 MMOL/L — HIGH (ref 96–108)
CO2 SERPL-SCNC: 27 MMOL/L — SIGNIFICANT CHANGE UP (ref 22–31)
COLOR SPEC: YELLOW — SIGNIFICANT CHANGE UP
CREAT SERPL-MCNC: 1.33 MG/DL — HIGH (ref 0.5–1.3)
DIFF PNL FLD: NEGATIVE — SIGNIFICANT CHANGE UP
EGFR: 53 ML/MIN/1.73M2 — LOW
EOSINOPHIL # BLD AUTO: 0.13 K/UL — SIGNIFICANT CHANGE UP (ref 0–0.5)
EOSINOPHIL NFR BLD AUTO: 2.3 % — SIGNIFICANT CHANGE UP (ref 0–6)
GLUCOSE SERPL-MCNC: 110 MG/DL — HIGH (ref 70–99)
GLUCOSE UR QL: NEGATIVE MG/DL — SIGNIFICANT CHANGE UP
HCT VFR BLD CALC: 38.3 % — LOW (ref 39–50)
HGB BLD-MCNC: 12.6 G/DL — LOW (ref 13–17)
IMM GRANULOCYTES NFR BLD AUTO: 0.2 % — SIGNIFICANT CHANGE UP (ref 0–0.9)
INR BLD: 0.94 RATIO — SIGNIFICANT CHANGE UP (ref 0.85–1.18)
KETONES UR-MCNC: NEGATIVE MG/DL — SIGNIFICANT CHANGE UP
LEUKOCYTE ESTERASE UR-ACNC: NEGATIVE — SIGNIFICANT CHANGE UP
LYMPHOCYTES # BLD AUTO: 1.06 K/UL — SIGNIFICANT CHANGE UP (ref 1–3.3)
LYMPHOCYTES # BLD AUTO: 18.5 % — SIGNIFICANT CHANGE UP (ref 13–44)
MCHC RBC-ENTMCNC: 32 PG — SIGNIFICANT CHANGE UP (ref 27–34)
MCHC RBC-ENTMCNC: 32.9 GM/DL — SIGNIFICANT CHANGE UP (ref 32–36)
MCV RBC AUTO: 97.2 FL — SIGNIFICANT CHANGE UP (ref 80–100)
MONOCYTES # BLD AUTO: 0.75 K/UL — SIGNIFICANT CHANGE UP (ref 0–0.9)
MONOCYTES NFR BLD AUTO: 13.1 % — SIGNIFICANT CHANGE UP (ref 2–14)
NEUTROPHILS # BLD AUTO: 3.73 K/UL — SIGNIFICANT CHANGE UP (ref 1.8–7.4)
NEUTROPHILS NFR BLD AUTO: 65 % — SIGNIFICANT CHANGE UP (ref 43–77)
NITRITE UR-MCNC: NEGATIVE — SIGNIFICANT CHANGE UP
PH UR: 5.5 — SIGNIFICANT CHANGE UP (ref 5–8)
PLATELET # BLD AUTO: 206 K/UL — SIGNIFICANT CHANGE UP (ref 150–400)
POTASSIUM SERPL-MCNC: 4.8 MMOL/L — SIGNIFICANT CHANGE UP (ref 3.5–5.3)
POTASSIUM SERPL-SCNC: 4.8 MMOL/L — SIGNIFICANT CHANGE UP (ref 3.5–5.3)
PROT UR-MCNC: NEGATIVE MG/DL — SIGNIFICANT CHANGE UP
PROTHROM AB SERPL-ACNC: 10.6 SEC — SIGNIFICANT CHANGE UP (ref 9.5–13)
RBC # BLD: 3.94 M/UL — LOW (ref 4.2–5.8)
RBC # FLD: 13.6 % — SIGNIFICANT CHANGE UP (ref 10.3–14.5)
SODIUM SERPL-SCNC: 139 MMOL/L — SIGNIFICANT CHANGE UP (ref 135–145)
SP GR SPEC: 1.03 — SIGNIFICANT CHANGE UP (ref 1–1.03)
UROBILINOGEN FLD QL: 0.2 MG/DL — SIGNIFICANT CHANGE UP (ref 0.2–1)
WBC # BLD: 5.73 K/UL — SIGNIFICANT CHANGE UP (ref 3.8–10.5)
WBC # FLD AUTO: 5.73 K/UL — SIGNIFICANT CHANGE UP (ref 3.8–10.5)

## 2024-05-15 PROCEDURE — 80048 BASIC METABOLIC PNL TOTAL CA: CPT

## 2024-05-15 PROCEDURE — 87641 MR-STAPH DNA AMP PROBE: CPT

## 2024-05-15 PROCEDURE — 82040 ASSAY OF SERUM ALBUMIN: CPT

## 2024-05-15 PROCEDURE — 86901 BLOOD TYPING SEROLOGIC RH(D): CPT

## 2024-05-15 PROCEDURE — 83036 HEMOGLOBIN GLYCOSYLATED A1C: CPT

## 2024-05-15 PROCEDURE — 86900 BLOOD TYPING SEROLOGIC ABO: CPT

## 2024-05-15 PROCEDURE — 87640 STAPH A DNA AMP PROBE: CPT

## 2024-05-15 PROCEDURE — 85730 THROMBOPLASTIN TIME PARTIAL: CPT

## 2024-05-15 PROCEDURE — 93010 ELECTROCARDIOGRAM REPORT: CPT

## 2024-05-15 PROCEDURE — 85025 COMPLETE CBC W/AUTO DIFF WBC: CPT

## 2024-05-15 PROCEDURE — 93005 ELECTROCARDIOGRAM TRACING: CPT

## 2024-05-15 PROCEDURE — 81003 URINALYSIS AUTO W/O SCOPE: CPT

## 2024-05-15 PROCEDURE — 36415 COLL VENOUS BLD VENIPUNCTURE: CPT

## 2024-05-15 PROCEDURE — 86850 RBC ANTIBODY SCREEN: CPT

## 2024-05-15 PROCEDURE — 99214 OFFICE O/P EST MOD 30 MIN: CPT | Mod: 25

## 2024-05-15 PROCEDURE — 85610 PROTHROMBIN TIME: CPT

## 2024-05-15 RX ORDER — ERGOCALCIFEROL 1.25 MG/1
0 CAPSULE ORAL
Refills: 0 | DISCHARGE

## 2024-05-15 RX ORDER — ROSUVASTATIN CALCIUM 5 MG/1
1 TABLET ORAL
Refills: 0 | DISCHARGE

## 2024-05-15 RX ORDER — OMEPRAZOLE 10 MG/1
1 CAPSULE, DELAYED RELEASE ORAL
Qty: 0 | Refills: 0 | DISCHARGE

## 2024-05-15 RX ORDER — ALFUZOSIN HYDROCHLORIDE 10 MG/1
1 TABLET, EXTENDED RELEASE ORAL
Qty: 0 | Refills: 0 | DISCHARGE

## 2024-05-15 NOTE — H&P PST ADULT - HISTORY OF PRESENT ILLNESS
[age] y.o WD, WN male presents to PST with hx of metastatic esophageal cancer. He was dx with esophageal cancer in 2015, had esophagectomy, chemo and radiation; mets to left lung 2017, surgery and chemo; ~ 2022 mets to right brain, mass noted on scan after loss of left peripheral vision. He had surgical resection at that time. Patient states recent scan revealed recurrent right brain mass. He denies headaches, but does report blurred vision. His hx is significant for HTN - no medication currently, Hyperlipidemia, GERD, BPH , and Aflutter - successful ablation. He has followed with neurosurgeon and scheduled for a Right Occipital Craniotomy and Resection of Recurrent Brain Tumor and Stealth, Neuromonitoring

## 2024-05-15 NOTE — H&P PST ADULT - NSICDXPASTMEDICALHX_GEN_ALL_CORE_FT
PAST MEDICAL HISTORY:  Atrial flutter, paroxysmal     BPH (benign prostatic hyperplasia)     Brain mass     Breast mass     Diverticulosis     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      PAST MEDICAL HISTORY:  Atrial flutter, paroxysmal     BPH (benign prostatic hyperplasia)     Brain mass     Breast mass     Diverticulosis     Esophageal carcinoma 2015 s/p chmeo and radiation  mets to lung 2017  s/p chemo finished  2/2018    GERD (gastroesophageal reflux disease)     HLD (hyperlipidemia)     HTN (hypertension)     Lung cancer s/p chemo 2018    Peripheral vision loss, left     Prediabetes     Secondary malignant neoplasm of brain

## 2024-05-15 NOTE — H&P PST ADULT - NS MD HP INPLANTS MED DEV
Hernia mesh, Thoracic hardware fusion, Bilateral shoulder hardware, bilateral wrist plates /screws,??Cranium hardware/plate/Lens implant

## 2024-05-15 NOTE — H&P PST ADULT - NSICDXPASTSURGICALHX_GEN_ALL_CORE_FT
PAST SURGICAL HISTORY:  Elective surgery port placement 2017 port removal 2018    H/O craniotomy     H/O esophagectomy 2015    H/O ventral hernia umbilical 1998    H/O: lung cancer lung resection LEFT 2017    History of bilateral cataract extraction     History of fusion of thoracic spine 2011    History of removal of Port-a-Cath     History of shoulder surgery bilateral rotator cuff    History of thoracic spinal fusion     Normal thoracoscopy     S/P ablation of atrial flutter     S/P breast lumpectomy     S/P carpal tunnel release rift 2014    S/P left rotator cuff repair     S/P right rotator cuff repair     S/P tonsillectomy and adenoidectomy     Wrist fracture, bilateral

## 2024-05-15 NOTE — H&P PST ADULT - ASSESSMENT
Home with home care 83 y.o male scheduled for Right Occipital Craniotomy and Resection of Recurrent Brain Tumor and Stealth, Neuromonitoring   Plan  1. Stop all NSAIDS, herbal supplements and vitamins for 7 days.  2. NPO at midnight.  3. Take the following medications Omeprazole, Alfuzosin with small sips of water on the morning of your procedure/surgery.  4. Use mupirocin as directed  5. Labs, EKG, CXR (CT chest on chart), MRSA swab as per surgeon  6. PMD/cardiologist ELICIA Presley visit for optimization prior to surgery as per surgeon.  7. Preprocedure education provided     CAPRINI SCORE    AGE RELATED RISK FACTORS                                                       MOBILITY RELATED FACTORS  [ ] Age 41-60 years                                            (1 Point)                  [ ] Bed rest                                                        (1 Point)  [ ] Age: 61-74 years                                           (2 Points)                [ ] Plaster cast                                                   (2 Points)  [ x] Age= 75 years                                              (3 Points)                 [ ] Bed bound for more than 72 hours                   (2 Points)    DISEASE RELATED RISK FACTORS                                               GENDER SPECIFIC FACTORS  [ ] Edema in the lower extremities                       (1 Point)                  [ ] Pregnancy                                                     (1 Point)  [ ] Varicose veins                                               (1 Point)                  [ ] Post-partum < 6 weeks                                   (1 Point)             [ x] BMI > 25 Kg/m2                                            (1 Point)                  [ ] Hormonal therapy  or oral contraception            (1 Point)                 [ ] Sepsis (in the previous month)                        (1 Point)                  [ ] History of pregnancy complications  [ ] Pneumonia or serious lung disease                                               [ ] Unexplained or recurrent                       (1 Point)           (in the previous month)                               (1 Point)  [ ] Abnormal pulmonary function test                     (1 Point)                 SURGERY RELATED RISK FACTORS  [ ] Acute myocardial infarction                              (1 Point)                 [ ]  Section                                            (1 Point)  [ ] Congestive heart failure (in the previous month)  (1 Point)                 [ ] Minor surgery                                                 (1 Point)   [ ] Inflammatory bowel disease                             (1 Point)                 [ ] Arthroscopic surgery                                        (2 Points)  [ ] Central venous access                                    (2 Points)                [ x General surgery lasting more than 45 minutes   (2 Points)       [ ] Stroke (in the previous month)                          (5 Points)               [ ] Elective arthroplasty                                        (5 Points)            [ x ] Malignancy present or previous                   (2 Points)                                                                                                                              HEMATOLOGY RELATED FACTORS                                                 TRAUMA RELATED RISK FACTORS  [ ] Prior episodes of VTE                                     (3 Points)                 [ ] Fracture of the hip, pelvis, or leg                       (5 Points)  [ ] Positive family history for VTE                         (3 Points)                 [ ] Acute spinal cord injury (in the previous month)  (5 Points)  [ ] Prothrombin 85453 A                                      (3 Points)                 [ ] Paralysis  (less than 1 month)                          (5 Points)  [ ] Factor V Leiden                                             (3 Points)                 [ ] Multiple Trauma within 1 month                         (5 Points)  [ ] Lupus anticoagulants                                     (3 Points)                                                           [ ] Anticardiolipin antibodies                                (3 Points)                                                       [ ] High homocysteine in the blood                      (3 Points)                                             [ ] Other congenital or acquired thrombophilia       (3 Points)                                                [ ] Heparin induced thrombocytopenia                  (3 Points)                                          Total Score [     8     ]      The Caprini score indicates that this patient is at high risk for a VTE event (score > = 6).    Surgical patients in this group will benefit from both pharmacologic prophylaxis and intermittent compression devices.    The surgical team will determine the balance between VTE risk and bleeding risk, and other clinical considerations.

## 2024-05-16 DIAGNOSIS — C79.31 SECONDARY MALIGNANT NEOPLASM OF BRAIN: ICD-10-CM

## 2024-05-16 DIAGNOSIS — Z01.818 ENCOUNTER FOR OTHER PREPROCEDURAL EXAMINATION: ICD-10-CM

## 2024-05-16 LAB
MRSA PCR RESULT.: SIGNIFICANT CHANGE UP
S AUREUS DNA NOSE QL NAA+PROBE: SIGNIFICANT CHANGE UP

## 2024-05-17 LAB
A1C WITH ESTIMATED AVERAGE GLUCOSE RESULT: 6.5 % — HIGH (ref 4–5.6)
ESTIMATED AVERAGE GLUCOSE: 140 MG/DL — HIGH (ref 68–114)

## 2024-05-23 ENCOUNTER — INPATIENT (INPATIENT)
Facility: HOSPITAL | Age: 84
LOS: 1 days | Discharge: HOME | DRG: 27 | End: 2024-05-25
Attending: SPECIALIST | Admitting: SPECIALIST
Payer: MEDICARE

## 2024-05-23 ENCOUNTER — APPOINTMENT (OUTPATIENT)
Dept: NEUROSURGERY | Facility: HOSPITAL | Age: 84
End: 2024-05-23

## 2024-05-23 ENCOUNTER — RESULT REVIEW (OUTPATIENT)
Age: 84
End: 2024-05-23

## 2024-05-23 VITALS
HEIGHT: 66 IN | SYSTOLIC BLOOD PRESSURE: 152 MMHG | HEART RATE: 67 BPM | TEMPERATURE: 98 F | DIASTOLIC BLOOD PRESSURE: 70 MMHG | OXYGEN SATURATION: 98 % | RESPIRATION RATE: 14 BRPM | WEIGHT: 176.37 LBS

## 2024-05-23 DIAGNOSIS — Z90.49 ACQUIRED ABSENCE OF OTHER SPECIFIED PARTS OF DIGESTIVE TRACT: ICD-10-CM

## 2024-05-23 DIAGNOSIS — E78.5 HYPERLIPIDEMIA, UNSPECIFIED: ICD-10-CM

## 2024-05-23 DIAGNOSIS — Z98.890 OTHER SPECIFIED POSTPROCEDURAL STATES: Chronic | ICD-10-CM

## 2024-05-23 DIAGNOSIS — Z87.891 PERSONAL HISTORY OF NICOTINE DEPENDENCE: ICD-10-CM

## 2024-05-23 DIAGNOSIS — Z98.1 ARTHRODESIS STATUS: Chronic | ICD-10-CM

## 2024-05-23 DIAGNOSIS — Z98.41 CATARACT EXTRACTION STATUS, RIGHT EYE: Chronic | ICD-10-CM

## 2024-05-23 DIAGNOSIS — N40.0 BENIGN PROSTATIC HYPERPLASIA WITHOUT LOWER URINARY TRACT SYMPTOMS: ICD-10-CM

## 2024-05-23 DIAGNOSIS — Z98.1 ARTHRODESIS STATUS: ICD-10-CM

## 2024-05-23 DIAGNOSIS — I10 ESSENTIAL (PRIMARY) HYPERTENSION: ICD-10-CM

## 2024-05-23 DIAGNOSIS — S62.101A FRACTURE OF UNSPECIFIED CARPAL BONE, RIGHT WRIST, INITIAL ENCOUNTER FOR CLOSED FRACTURE: Chronic | ICD-10-CM

## 2024-05-23 DIAGNOSIS — K21.9 GASTRO-ESOPHAGEAL REFLUX DISEASE WITHOUT ESOPHAGITIS: ICD-10-CM

## 2024-05-23 DIAGNOSIS — Z85.118 PERSONAL HISTORY OF OTHER MALIGNANT NEOPLASM OF BRONCHUS AND LUNG: Chronic | ICD-10-CM

## 2024-05-23 DIAGNOSIS — Z79.899 OTHER LONG TERM (CURRENT) DRUG THERAPY: ICD-10-CM

## 2024-05-23 DIAGNOSIS — R73.03 PREDIABETES: ICD-10-CM

## 2024-05-23 DIAGNOSIS — Z79.1 LONG TERM (CURRENT) USE OF NON-STEROIDAL ANTI-INFLAMMATORIES (NSAID): ICD-10-CM

## 2024-05-23 DIAGNOSIS — C79.31 SECONDARY MALIGNANT NEOPLASM OF BRAIN: ICD-10-CM

## 2024-05-23 DIAGNOSIS — Z87.19 PERSONAL HISTORY OF OTHER DISEASES OF THE DIGESTIVE SYSTEM: Chronic | ICD-10-CM

## 2024-05-23 DIAGNOSIS — Z41.9 ENCOUNTER FOR PROCEDURE FOR PURPOSES OTHER THAN REMEDYING HEALTH STATE, UNSPECIFIED: Chronic | ICD-10-CM

## 2024-05-23 DIAGNOSIS — Z92.3 PERSONAL HISTORY OF IRRADIATION: ICD-10-CM

## 2024-05-23 DIAGNOSIS — Z90.49 ACQUIRED ABSENCE OF OTHER SPECIFIED PARTS OF DIGESTIVE TRACT: Chronic | ICD-10-CM

## 2024-05-23 DIAGNOSIS — Z92.21 PERSONAL HISTORY OF ANTINEOPLASTIC CHEMOTHERAPY: ICD-10-CM

## 2024-05-23 DIAGNOSIS — Z01.89 ENCOUNTER FOR OTHER SPECIFIED SPECIAL EXAMINATIONS: Chronic | ICD-10-CM

## 2024-05-23 DIAGNOSIS — Z85.01 PERSONAL HISTORY OF MALIGNANT NEOPLASM OF ESOPHAGUS: ICD-10-CM

## 2024-05-23 DIAGNOSIS — Z90.89 ACQUIRED ABSENCE OF OTHER ORGANS: Chronic | ICD-10-CM

## 2024-05-23 LAB
ALBUMIN SERPL ELPH-MCNC: 3.3 G/DL — SIGNIFICANT CHANGE UP (ref 3.3–5)
ALP SERPL-CCNC: 60 U/L — SIGNIFICANT CHANGE UP (ref 40–120)
ALT FLD-CCNC: 24 U/L — SIGNIFICANT CHANGE UP (ref 12–78)
ANION GAP SERPL CALC-SCNC: 8 MMOL/L — SIGNIFICANT CHANGE UP (ref 5–17)
AST SERPL-CCNC: 31 U/L — SIGNIFICANT CHANGE UP (ref 15–37)
BILIRUB SERPL-MCNC: 0.2 MG/DL — SIGNIFICANT CHANGE UP (ref 0.2–1.2)
BUN SERPL-MCNC: 25 MG/DL — HIGH (ref 7–23)
CALCIUM SERPL-MCNC: 9.2 MG/DL — SIGNIFICANT CHANGE UP (ref 8.5–10.1)
CHLORIDE SERPL-SCNC: 110 MMOL/L — HIGH (ref 96–108)
CO2 SERPL-SCNC: 23 MMOL/L — SIGNIFICANT CHANGE UP (ref 22–31)
CREAT SERPL-MCNC: 1.27 MG/DL — SIGNIFICANT CHANGE UP (ref 0.5–1.3)
EGFR: 56 ML/MIN/1.73M2 — LOW
GLUCOSE BLDC GLUCOMTR-MCNC: 106 MG/DL — HIGH (ref 70–99)
GLUCOSE BLDC GLUCOMTR-MCNC: 133 MG/DL — HIGH (ref 70–99)
GLUCOSE BLDC GLUCOMTR-MCNC: 147 MG/DL — HIGH (ref 70–99)
GLUCOSE SERPL-MCNC: 170 MG/DL — HIGH (ref 70–99)
HCT VFR BLD CALC: 36.5 % — LOW (ref 39–50)
HGB BLD-MCNC: 12.7 G/DL — LOW (ref 13–17)
MCHC RBC-ENTMCNC: 32.4 PG — SIGNIFICANT CHANGE UP (ref 27–34)
MCHC RBC-ENTMCNC: 34.8 GM/DL — SIGNIFICANT CHANGE UP (ref 32–36)
MCV RBC AUTO: 93.1 FL — SIGNIFICANT CHANGE UP (ref 80–100)
PLATELET # BLD AUTO: 223 K/UL — SIGNIFICANT CHANGE UP (ref 150–400)
POTASSIUM SERPL-MCNC: 4.4 MMOL/L — SIGNIFICANT CHANGE UP (ref 3.5–5.3)
POTASSIUM SERPL-SCNC: 4.4 MMOL/L — SIGNIFICANT CHANGE UP (ref 3.5–5.3)
PROT SERPL-MCNC: 6.8 GM/DL — SIGNIFICANT CHANGE UP (ref 6–8.3)
RBC # BLD: 3.92 M/UL — LOW (ref 4.2–5.8)
RBC # FLD: 12.9 % — SIGNIFICANT CHANGE UP (ref 10.3–14.5)
SODIUM SERPL-SCNC: 141 MMOL/L — SIGNIFICANT CHANGE UP (ref 135–145)
WBC # BLD: 13.37 K/UL — HIGH (ref 3.8–10.5)
WBC # FLD AUTO: 13.37 K/UL — HIGH (ref 3.8–10.5)

## 2024-05-23 PROCEDURE — 88331 PATH CONSLTJ SURG 1 BLK 1SPC: CPT

## 2024-05-23 PROCEDURE — 97116 GAIT TRAINING THERAPY: CPT | Mod: GP

## 2024-05-23 PROCEDURE — 80048 BASIC METABOLIC PNL TOTAL CA: CPT

## 2024-05-23 PROCEDURE — 84100 ASSAY OF PHOSPHORUS: CPT

## 2024-05-23 PROCEDURE — 88342 IMHCHEM/IMCYTCHM 1ST ANTB: CPT | Mod: 26

## 2024-05-23 PROCEDURE — 83735 ASSAY OF MAGNESIUM: CPT

## 2024-05-23 PROCEDURE — 70553 MRI BRAIN STEM W/O & W/DYE: CPT | Mod: MC

## 2024-05-23 PROCEDURE — 70450 CT HEAD/BRAIN W/O DYE: CPT | Mod: 26

## 2024-05-23 PROCEDURE — 97163 PT EVAL HIGH COMPLEX 45 MIN: CPT | Mod: GP

## 2024-05-23 PROCEDURE — 80053 COMPREHEN METABOLIC PANEL: CPT

## 2024-05-23 PROCEDURE — C1889: CPT

## 2024-05-23 PROCEDURE — 36415 COLL VENOUS BLD VENIPUNCTURE: CPT

## 2024-05-23 PROCEDURE — 99232 SBSQ HOSP IP/OBS MODERATE 35: CPT

## 2024-05-23 PROCEDURE — 61510 CRNEC TREPH EXC BRN TUM STTL: CPT

## 2024-05-23 PROCEDURE — 70250 X-RAY EXAM OF SKULL: CPT | Mod: 26

## 2024-05-23 PROCEDURE — 88331 PATH CONSLTJ SURG 1 BLK 1SPC: CPT | Mod: 26

## 2024-05-23 PROCEDURE — C1763: CPT

## 2024-05-23 PROCEDURE — 61510 CRNEC TREPH EXC BRN TUM STTL: CPT | Mod: AS

## 2024-05-23 PROCEDURE — 85027 COMPLETE CBC AUTOMATED: CPT

## 2024-05-23 PROCEDURE — 61781 SCAN PROC CRANIAL INTRA: CPT | Mod: AS

## 2024-05-23 PROCEDURE — 69990 MICROSURGERY ADD-ON: CPT | Mod: AS,59

## 2024-05-23 PROCEDURE — C1713: CPT

## 2024-05-23 PROCEDURE — A9579: CPT

## 2024-05-23 PROCEDURE — 88307 TISSUE EXAM BY PATHOLOGIST: CPT

## 2024-05-23 PROCEDURE — 70450 CT HEAD/BRAIN W/O DYE: CPT | Mod: MC

## 2024-05-23 PROCEDURE — 82962 GLUCOSE BLOOD TEST: CPT

## 2024-05-23 PROCEDURE — 88307 TISSUE EXAM BY PATHOLOGIST: CPT | Mod: 26

## 2024-05-23 PROCEDURE — 88341 IMHCHEM/IMCYTCHM EA ADD ANTB: CPT | Mod: 26

## 2024-05-23 PROCEDURE — 61781 SCAN PROC CRANIAL INTRA: CPT

## 2024-05-23 PROCEDURE — 70250 X-RAY EXAM OF SKULL: CPT

## 2024-05-23 PROCEDURE — 88341 IMHCHEM/IMCYTCHM EA ADD ANTB: CPT

## 2024-05-23 PROCEDURE — 88342 IMHCHEM/IMCYTCHM 1ST ANTB: CPT

## 2024-05-23 PROCEDURE — 69990 MICROSURGERY ADD-ON: CPT | Mod: 59

## 2024-05-23 RX ORDER — DEXAMETHASONE 0.5 MG/5ML
4 ELIXIR ORAL DAILY
Refills: 0 | Status: DISCONTINUED | OUTPATIENT
Start: 2024-05-23 | End: 2024-05-25

## 2024-05-23 RX ORDER — ATORVASTATIN CALCIUM 80 MG/1
80 TABLET, FILM COATED ORAL AT BEDTIME
Refills: 0 | Status: DISCONTINUED | OUTPATIENT
Start: 2024-05-23 | End: 2024-05-23

## 2024-05-23 RX ORDER — ONDANSETRON 8 MG/1
4 TABLET, FILM COATED ORAL ONCE
Refills: 0 | Status: DISCONTINUED | OUTPATIENT
Start: 2024-05-23 | End: 2024-05-23

## 2024-05-23 RX ORDER — OXYCODONE HYDROCHLORIDE 5 MG/1
5 TABLET ORAL EVERY 4 HOURS
Refills: 0 | Status: DISCONTINUED | OUTPATIENT
Start: 2024-05-23 | End: 2024-05-25

## 2024-05-23 RX ORDER — LEVETIRACETAM 250 MG/1
1000 TABLET, FILM COATED ORAL ONCE
Refills: 0 | Status: DISCONTINUED | OUTPATIENT
Start: 2024-05-23 | End: 2024-05-23

## 2024-05-23 RX ORDER — ATORVASTATIN CALCIUM 80 MG/1
80 TABLET, FILM COATED ORAL AT BEDTIME
Refills: 0 | Status: DISCONTINUED | OUTPATIENT
Start: 2024-05-23 | End: 2024-05-25

## 2024-05-23 RX ORDER — DEXAMETHASONE 0.5 MG/5ML
4 ELIXIR ORAL EVERY 8 HOURS
Refills: 0 | Status: DISCONTINUED | OUTPATIENT
Start: 2024-05-23 | End: 2024-05-23

## 2024-05-23 RX ORDER — PANTOPRAZOLE SODIUM 20 MG/1
40 TABLET, DELAYED RELEASE ORAL
Refills: 0 | Status: DISCONTINUED | OUTPATIENT
Start: 2024-05-23 | End: 2024-05-23

## 2024-05-23 RX ORDER — ACETAMINOPHEN 500 MG
1000 TABLET ORAL ONCE
Refills: 0 | Status: COMPLETED | OUTPATIENT
Start: 2024-05-23 | End: 2024-05-23

## 2024-05-23 RX ORDER — CEFAZOLIN SODIUM 1 G
1000 VIAL (EA) INJECTION EVERY 8 HOURS
Refills: 0 | Status: DISCONTINUED | OUTPATIENT
Start: 2024-05-23 | End: 2024-05-23

## 2024-05-23 RX ORDER — SODIUM CHLORIDE 9 MG/ML
1000 INJECTION, SOLUTION INTRAVENOUS
Refills: 0 | Status: DISCONTINUED | OUTPATIENT
Start: 2024-05-23 | End: 2024-05-23

## 2024-05-23 RX ORDER — ROSUVASTATIN CALCIUM 5 MG/1
1 TABLET ORAL
Refills: 0 | DISCHARGE

## 2024-05-23 RX ORDER — CEFAZOLIN SODIUM 1 G
1000 VIAL (EA) INJECTION EVERY 8 HOURS
Refills: 0 | Status: COMPLETED | OUTPATIENT
Start: 2024-05-23 | End: 2024-05-24

## 2024-05-23 RX ORDER — SODIUM CHLORIDE 9 MG/ML
1000 INJECTION INTRAMUSCULAR; INTRAVENOUS; SUBCUTANEOUS
Refills: 0 | Status: DISCONTINUED | OUTPATIENT
Start: 2024-05-23 | End: 2024-05-23

## 2024-05-23 RX ORDER — MULTIVIT-MIN/FERROUS GLUCONATE 9 MG/15 ML
1 LIQUID (ML) ORAL
Refills: 0 | DISCHARGE

## 2024-05-23 RX ORDER — PANTOPRAZOLE SODIUM 20 MG/1
40 TABLET, DELAYED RELEASE ORAL DAILY
Refills: 0 | Status: DISCONTINUED | OUTPATIENT
Start: 2024-05-23 | End: 2024-05-25

## 2024-05-23 RX ORDER — LEVETIRACETAM 250 MG/1
500 TABLET, FILM COATED ORAL
Refills: 0 | Status: DISCONTINUED | OUTPATIENT
Start: 2024-05-23 | End: 2024-05-25

## 2024-05-23 RX ORDER — OMEPRAZOLE 10 MG/1
1 CAPSULE, DELAYED RELEASE ORAL
Refills: 0 | DISCHARGE

## 2024-05-23 RX ORDER — CHOLECALCIFEROL (VITAMIN D3) 125 MCG
1 CAPSULE ORAL
Refills: 0 | DISCHARGE

## 2024-05-23 RX ORDER — ALFUZOSIN HYDROCHLORIDE 10 MG/1
2 TABLET, EXTENDED RELEASE ORAL
Refills: 0 | DISCHARGE

## 2024-05-23 RX ORDER — MORPHINE SULFATE 50 MG/1
3 CAPSULE, EXTENDED RELEASE ORAL EVERY 4 HOURS
Refills: 0 | Status: DISCONTINUED | OUTPATIENT
Start: 2024-05-23 | End: 2024-05-25

## 2024-05-23 RX ORDER — FENTANYL CITRATE 50 UG/ML
50 INJECTION INTRAVENOUS
Refills: 0 | Status: DISCONTINUED | OUTPATIENT
Start: 2024-05-23 | End: 2024-05-23

## 2024-05-23 RX ADMIN — Medication 1000 MILLIGRAM(S): at 21:04

## 2024-05-23 RX ADMIN — FENTANYL CITRATE 50 MICROGRAM(S): 50 INJECTION INTRAVENOUS at 15:45

## 2024-05-23 RX ADMIN — ATORVASTATIN CALCIUM 80 MILLIGRAM(S): 80 TABLET, FILM COATED ORAL at 21:23

## 2024-05-23 RX ADMIN — FENTANYL CITRATE 50 MICROGRAM(S): 50 INJECTION INTRAVENOUS at 15:50

## 2024-05-23 RX ADMIN — PANTOPRAZOLE SODIUM 40 MILLIGRAM(S): 20 TABLET, DELAYED RELEASE ORAL at 18:49

## 2024-05-23 RX ADMIN — LEVETIRACETAM 500 MILLIGRAM(S): 250 TABLET, FILM COATED ORAL at 21:22

## 2024-05-23 RX ADMIN — Medication 1 TABLET(S): at 18:49

## 2024-05-23 RX ADMIN — Medication 400 MILLIGRAM(S): at 21:04

## 2024-05-23 RX ADMIN — Medication 10 MILLIGRAM(S): at 21:22

## 2024-05-23 RX ADMIN — Medication 1000 MILLIGRAM(S): at 22:35

## 2024-05-23 RX ADMIN — Medication 4 MILLIGRAM(S): at 18:49

## 2024-05-23 RX ADMIN — OXYCODONE HYDROCHLORIDE 5 MILLIGRAM(S): 5 TABLET ORAL at 16:20

## 2024-05-23 NOTE — PATIENT PROFILE ADULT - FUNCTIONAL ASSESSMENT - DAILY ACTIVITY ASSESSMENT TYPE
Pt returned from CT at this time. Pt on monitors x 3. Wife bedside at this time. Pt. Resting comfortably in bed, denies needs at this time. Bed locked and low, call bell in reach. Admission

## 2024-05-23 NOTE — PATIENT PROFILE ADULT - FUNCTIONAL SCREEN CURRENT LEVEL: SWALLOWING (IF SCORE 2 OR MORE FOR ANY ITEM, CONSULT REHAB SERVICES), MLM)
PROCEDURE NOTE: Lucentis 0.5mg PFS OD. Diagnosis: Neovascular AMD with Active CNV. Anesthesia: Lidocaine 4%. Prep: Betadine Flush. Prior to injection, risks/benefits/alternatives discussed including but not limited to infection, loss of vision or eye, hemorrhage, cataract, glaucoma, retinal tears or detachment. The patient wished to proceed with treatment. Topical anesthesia was induced with Alcaine. Additional anesthesia was achieved using drop(s) or injection checked above. A drop of Povidone-iodine 5% ophthalmic solution was instilled over the injection site and in the inferior fornix. Betadine prep was performed. A single use prefilled syringe of intravitreal Lucentis 0.5mg/0.05ml was used and excess was disposed of as waste. The needle was passed 3.0 mm posterior to the limbus in pseudophakic patients, and 3.5 mm posterior to the limbus in phakic patients. Injection Time 9:00AM. Patient tolerated the procedure well. There were no complications. The eye was irrigated with sterile irrigating solution. Post procedure instructions given. The patient was instructed to use Artificial Tears q.i.d. p.r.n for comfort. The patient was instructed to return for re-evaluation in approximately 4-12 weeks depending on his/her condition and was told to call immediately if vision decreases and/or if his/her eye becomes red, painful, and/or light sensitive. The patient was instructed to go to the emergency room or call 911 if unable to reach the doctor within an hour or two of trying or calling. Rebel Barillas
0 = swallows foods/liquids without difficulty

## 2024-05-23 NOTE — PROGRESS NOTE ADULT - SUBJECTIVE AND OBJECTIVE BOX
Interval History:        Patient s/p right occipital craniotomy        post procedure ct no bleeding in bed    HPI:       83 y.o WD, WN male with hx of metastatic esophageal cancer. He was dx with esophageal cancer in 2015, had esophagectomy, chemo and radiation; mets to left lung 2017, surgery and chemo; ~ 2022 mets to right brain, mass noted on scan after loss of left peripheral vision. He had surgical resection at that time. Patient had a  recent scan revealed recurrent right brain mass. His symptoms were blurry vision, . His hx is significant for HTN - no medication currently, Hyperlipidemia, GERD, BPH , and Aflutter - successful ablation.    Today he underwent resection of right occipital brain met  Post procedure awake, alert convesant      PMH:        as above    ROS slight headache, no chest pain , no sob, moves all extremtieis strongly, slight blurry vision       MEDICATIONS  (STANDING):  atorvastatin 80 milliGRAM(s) Oral at bedtime  ceFAZolin  Injectable. 1000 milliGRAM(s) IV Push every 8 hours  dexAMETHasone  Injectable 4 milliGRAM(s) IV Push daily  dicyclomine 10 milliGRAM(s) Oral <User Schedule>  lactated ringers. 1000 milliLiter(s) (75 mL/Hr) IV Continuous <Continuous>  levETIRAcetam 500 milliGRAM(s) Oral two times a day  multivitamin 1 Tablet(s) Oral daily  pantoprazole    Tablet 40 milliGRAM(s) Oral daily    MEDICATIONS  (PRN):  fentaNYL    Injectable 50 MICROGram(s) IV Push every 5 minutes PRN Severe Pain (7 - 10)  morphine  - Injectable 3 milliGRAM(s) IV Push every 4 hours PRN Severe Pain (7 - 10)  ondansetron Injectable 4 milliGRAM(s) IV Push once PRN Nausea and/or Vomiting  oxyCODONE    IR 5 milliGRAM(s) Oral every 4 hours PRN Moderate Pain (4 - 6)      Height (cm): 167.6 (05-23 @ 06:33)  Weight (kg): 79.968 (05-23 @ 06:33)  BMI (kg/m2): 28.5 (05-23 @ 06:33)    ICU Vital Signs Last 24 Hrs  T(C): 36.8 (23 May 2024 15:20), Max: 36.8 (23 May 2024 15:20)  T(F): 98.2 (23 May 2024 15:20), Max: 98.2 (23 May 2024 15:20)  HR: 80 (23 May 2024 16:30) (67 - 94)  BP: 138/85 (23 May 2024 16:30) (138/85 - 155/103)  BP(mean): --  ABP: 147/72 (23 May 2024 16:30) (142/71 - 168/84)  ABP(mean): --  RR: 12 (23 May 2024 16:30) (11 - 14)  SpO2: 96% (23 May 2024 16:15) (95% - 99%)    O2 Parameters below as of 23 May 2024 15:20  Patient On (Oxygen Delivery Method): nasal cannula  O2 Flow (L/min): 3    Physical exam    general no distress  HEENT dressing dry  neck supple  lungs breathing comfortable  cv rrr  abdomen soft  gu fajardo  extrmeities warm  neuro moves all extremtiies strongly , conversant, GCS 15          I&O's Summary    23 May 2024 07:01  -  23 May 2024 18:26  --------------------------------------------------------  IN: 2000 mL / OUT: 1850 mL / NET: 150 mL         DVT Prophylaxis:      venodyne

## 2024-05-23 NOTE — BRIEF OPERATIVE NOTE - OPERATION/FINDINGS
right occipital craniotomy and resection of tumor with neuronavigation and neuromonitoring. good resection down to ventricle. neuromonitoring stable throughout. pathology of frozen specimen consistent with adenocarcinoma

## 2024-05-23 NOTE — BRIEF OPERATIVE NOTE - NSICDXBRIEFPROCEDURE_GEN_ALL_CORE_FT
PROCEDURES:  Craniotomy, using frameless stereotaxy, for tumor resection 23-May-2024 14:41:46  Rene Novoa

## 2024-05-24 LAB
ALBUMIN SERPL ELPH-MCNC: 3.3 G/DL — SIGNIFICANT CHANGE UP (ref 3.3–5)
ALP SERPL-CCNC: 56 U/L — SIGNIFICANT CHANGE UP (ref 40–120)
ALT FLD-CCNC: 23 U/L — SIGNIFICANT CHANGE UP (ref 12–78)
ANION GAP SERPL CALC-SCNC: 6 MMOL/L — SIGNIFICANT CHANGE UP (ref 5–17)
AST SERPL-CCNC: 29 U/L — SIGNIFICANT CHANGE UP (ref 15–37)
BILIRUB SERPL-MCNC: 0.2 MG/DL — SIGNIFICANT CHANGE UP (ref 0.2–1.2)
BUN SERPL-MCNC: 25 MG/DL — HIGH (ref 7–23)
CALCIUM SERPL-MCNC: 9.2 MG/DL — SIGNIFICANT CHANGE UP (ref 8.5–10.1)
CHLORIDE SERPL-SCNC: 107 MMOL/L — SIGNIFICANT CHANGE UP (ref 96–108)
CO2 SERPL-SCNC: 24 MMOL/L — SIGNIFICANT CHANGE UP (ref 22–31)
CREAT SERPL-MCNC: 1.22 MG/DL — SIGNIFICANT CHANGE UP (ref 0.5–1.3)
EGFR: 59 ML/MIN/1.73M2 — LOW
GLUCOSE BLDC GLUCOMTR-MCNC: 120 MG/DL — HIGH (ref 70–99)
GLUCOSE BLDC GLUCOMTR-MCNC: 140 MG/DL — HIGH (ref 70–99)
GLUCOSE BLDC GLUCOMTR-MCNC: 150 MG/DL — HIGH (ref 70–99)
GLUCOSE BLDC GLUCOMTR-MCNC: 154 MG/DL — HIGH (ref 70–99)
GLUCOSE SERPL-MCNC: 160 MG/DL — HIGH (ref 70–99)
HCT VFR BLD CALC: 34.5 % — LOW (ref 39–50)
HGB BLD-MCNC: 12.1 G/DL — LOW (ref 13–17)
MCHC RBC-ENTMCNC: 32.9 PG — SIGNIFICANT CHANGE UP (ref 27–34)
MCHC RBC-ENTMCNC: 35.1 GM/DL — SIGNIFICANT CHANGE UP (ref 32–36)
MCV RBC AUTO: 93.8 FL — SIGNIFICANT CHANGE UP (ref 80–100)
PLATELET # BLD AUTO: 207 K/UL — SIGNIFICANT CHANGE UP (ref 150–400)
POTASSIUM SERPL-MCNC: 4 MMOL/L — SIGNIFICANT CHANGE UP (ref 3.5–5.3)
POTASSIUM SERPL-SCNC: 4 MMOL/L — SIGNIFICANT CHANGE UP (ref 3.5–5.3)
PROT SERPL-MCNC: 6.6 GM/DL — SIGNIFICANT CHANGE UP (ref 6–8.3)
RBC # BLD: 3.68 M/UL — LOW (ref 4.2–5.8)
RBC # FLD: 12.9 % — SIGNIFICANT CHANGE UP (ref 10.3–14.5)
SODIUM SERPL-SCNC: 137 MMOL/L — SIGNIFICANT CHANGE UP (ref 135–145)
WBC # BLD: 16.5 K/UL — HIGH (ref 3.8–10.5)
WBC # FLD AUTO: 16.5 K/UL — HIGH (ref 3.8–10.5)

## 2024-05-24 PROCEDURE — 70553 MRI BRAIN STEM W/O & W/DYE: CPT | Mod: 26

## 2024-05-24 PROCEDURE — 99232 SBSQ HOSP IP/OBS MODERATE 35: CPT

## 2024-05-24 RX ORDER — ACETAMINOPHEN 500 MG
1000 TABLET ORAL ONCE
Refills: 0 | Status: COMPLETED | OUTPATIENT
Start: 2024-05-24 | End: 2024-05-24

## 2024-05-24 RX ORDER — SENNA PLUS 8.6 MG/1
1 TABLET ORAL AT BEDTIME
Refills: 0 | Status: DISCONTINUED | OUTPATIENT
Start: 2024-05-24 | End: 2024-05-25

## 2024-05-24 RX ORDER — POLYETHYLENE GLYCOL 3350 17 G/17G
17 POWDER, FOR SOLUTION ORAL DAILY
Refills: 0 | Status: DISCONTINUED | OUTPATIENT
Start: 2024-05-24 | End: 2024-05-25

## 2024-05-24 RX ORDER — CHLORHEXIDINE GLUCONATE 213 G/1000ML
1 SOLUTION TOPICAL
Refills: 0 | Status: DISCONTINUED | OUTPATIENT
Start: 2024-05-24 | End: 2024-05-25

## 2024-05-24 RX ORDER — ACETAMINOPHEN 500 MG
1000 TABLET ORAL ONCE
Refills: 0 | Status: DISCONTINUED | OUTPATIENT
Start: 2024-05-24 | End: 2024-05-25

## 2024-05-24 RX ADMIN — LEVETIRACETAM 500 MILLIGRAM(S): 250 TABLET, FILM COATED ORAL at 21:43

## 2024-05-24 RX ADMIN — Medication 1 TABLET(S): at 10:21

## 2024-05-24 RX ADMIN — MORPHINE SULFATE 3 MILLIGRAM(S): 50 CAPSULE, EXTENDED RELEASE ORAL at 04:28

## 2024-05-24 RX ADMIN — MORPHINE SULFATE 3 MILLIGRAM(S): 50 CAPSULE, EXTENDED RELEASE ORAL at 04:43

## 2024-05-24 RX ADMIN — SENNA PLUS 1 TABLET(S): 8.6 TABLET ORAL at 21:43

## 2024-05-24 RX ADMIN — Medication 400 MILLIGRAM(S): at 08:08

## 2024-05-24 RX ADMIN — PANTOPRAZOLE SODIUM 40 MILLIGRAM(S): 20 TABLET, DELAYED RELEASE ORAL at 10:21

## 2024-05-24 RX ADMIN — Medication 4 MILLIGRAM(S): at 10:21

## 2024-05-24 RX ADMIN — Medication 1000 MILLIGRAM(S): at 05:09

## 2024-05-24 RX ADMIN — OXYCODONE HYDROCHLORIDE 5 MILLIGRAM(S): 5 TABLET ORAL at 04:20

## 2024-05-24 RX ADMIN — Medication 400 MILLIGRAM(S): at 17:30

## 2024-05-24 RX ADMIN — LEVETIRACETAM 500 MILLIGRAM(S): 250 TABLET, FILM COATED ORAL at 10:21

## 2024-05-24 RX ADMIN — ATORVASTATIN CALCIUM 80 MILLIGRAM(S): 80 TABLET, FILM COATED ORAL at 21:43

## 2024-05-24 RX ADMIN — OXYCODONE HYDROCHLORIDE 5 MILLIGRAM(S): 5 TABLET ORAL at 03:39

## 2024-05-24 RX ADMIN — OXYCODONE HYDROCHLORIDE 5 MILLIGRAM(S): 5 TABLET ORAL at 21:43

## 2024-05-24 RX ADMIN — OXYCODONE HYDROCHLORIDE 5 MILLIGRAM(S): 5 TABLET ORAL at 22:13

## 2024-05-24 NOTE — PHYSICAL THERAPY INITIAL EVALUATION ADULT - ADDITIONAL COMMENTS
Pt reports being very active at home.  L visual field limited at around 45 deg unable to see in the peripheral view after approx 45 deg.  Info obtained from eval 4/2022 Pt reports being very active at home.  L visual field limited at around 45 deg unable to see in the peripheral view after approx 45 deg.  Info obtained from eval 4/2022. Update 5/24- pt stating he has been using rollator PTA.

## 2024-05-24 NOTE — PROGRESS NOTE ADULT - SUBJECTIVE AND OBJECTIVE BOX
Patient is a 83y old  Male who presents with a chief complaint of metastatic brain lesion (24 May 2024 10:01)    24 hour events:     Allergies    No Known Allergies    Intolerances      REVIEW OF SYSTEMS: SEE BELOW       ICU Vital Signs Last 24 Hrs  T(C): 36.8 (24 May 2024 09:22), Max: 36.9 (23 May 2024 18:08)  T(F): 98.3 (24 May 2024 09:22), Max: 98.5 (23 May 2024 18:08)  HR: 72 (24 May 2024 11:00) (69 - 94)  BP: 105/55 (24 May 2024 11:00) (105/55 - 163/79)  BP(mean): 70 (24 May 2024 11:00) (70 - 105)  ABP: 150/68 (24 May 2024 08:00) (118/64 - 168/84)  ABP(mean): 100 (24 May 2024 08:00) (79 - 113)  RR: 13 (24 May 2024 11:00) (10 - 25)  SpO2: 99% (24 May 2024 11:00) (92% - 100%)    O2 Parameters below as of 24 May 2024 07:00  Patient On (Oxygen Delivery Method): room air            CAPILLARY BLOOD GLUCOSE      POCT Blood Glucose.: 140 mg/dL (24 May 2024 05:15)  POCT Blood Glucose.: 150 mg/dL (24 May 2024 00:52)  POCT Blood Glucose.: 147 mg/dL (23 May 2024 18:26)  POCT Blood Glucose.: 133 mg/dL (23 May 2024 15:25)      I&O's Summary    23 May 2024 07:01  -  24 May 2024 07:00  --------------------------------------------------------  IN: 2340 mL / OUT: 2475 mL / NET: -135 mL            MEDICATIONS  (STANDING):  atorvastatin 80 milliGRAM(s) Oral at bedtime  chlorhexidine 4% Liquid 1 Application(s) Topical <User Schedule>  dexAMETHasone  Injectable 4 milliGRAM(s) IV Push daily  levETIRAcetam 500 milliGRAM(s) Oral two times a day  multivitamin 1 Tablet(s) Oral daily  pantoprazole    Tablet 40 milliGRAM(s) Oral daily      MEDICATIONS  (PRN):  morphine  - Injectable 3 milliGRAM(s) IV Push every 4 hours PRN Severe Pain (7 - 10)  oxyCODONE    IR 5 milliGRAM(s) Oral every 4 hours PRN Moderate Pain (4 - 6)      PHYSICAL EXAM: SEE BELOW                          12.1   16.50 )-----------( 207      ( 24 May 2024 05:22 )             34.5       05-24    137  |  107  |  25<H>  ----------------------------<  160<H>  4.0   |  24  |  1.22    Ca    9.2      24 May 2024 05:22    TPro  6.6  /  Alb  3.3  /  TBili  0.2  /  DBili  x   /  AST  29  /  ALT  23  /  AlkPhos  56  05-24            Urinalysis Basic - ( 24 May 2024 05:22 )    Color: x / Appearance: x / SG: x / pH: x  Gluc: 160 mg/dL / Ketone: x  / Bili: x / Urobili: x   Blood: x / Protein: x / Nitrite: x   Leuk Esterase: x / RBC: x / WBC x   Sq Epi: x / Non Sq Epi: x / Bacteria: x

## 2024-05-24 NOTE — PHYSICAL THERAPY INITIAL EVALUATION ADULT - GAIT TRAINING, PT EVAL
By D/C: Pt will amb with ' with independence. By D/C: Pt will ascend/descend 5 steps with HR, step to gait pattern, and independence.

## 2024-05-24 NOTE — PROGRESS NOTE ADULT - SUBJECTIVE AND OBJECTIVE BOX
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 He presented through ambulatory surgery for a scheduled procedure. Pt under went a left sided occipital craniotomy for resection of tumor. Pt tolerated the procedure and anesthesia well, he was recovered in PACU then sent to the ICU overnight.    5/24- POD #1, pt seen and examined in the ICU, no event overnight, c/o headache, no nausea or vomiting, blood pressure stable. Continue decadron and Keppra.     Vital Signs Last 24 Hrs  T(C): 36.8 (24 May 2024 09:22), Max: 36.9 (23 May 2024 18:08)  T(F): 98.3 (24 May 2024 09:22), Max: 98.5 (23 May 2024 18:08)  HR: 69 (24 May 2024 10:00) (69 - 94)  BP: 119/66 (24 May 2024 10:00) (116/73 - 163/79)  BP(mean): 81 (24 May 2024 10:00) (75 - 105)  RR: 14 (24 May 2024 10:00) (10 - 25)  SpO2: 95% (24 May 2024 10:00) (92% - 100%)    Parameters below as of 24 May 2024 07:00  Patient On (Oxygen Delivery Method): room air    MEDICATIONS  (STANDING):  atorvastatin 80 milliGRAM(s) Oral at bedtime  dexAMETHasone  Injectable 4 milliGRAM(s) IV Push daily  levETIRAcetam 500 milliGRAM(s) Oral two times a day  multivitamin 1 Tablet(s) Oral daily  pantoprazole    Tablet 40 milliGRAM(s) Oral daily    MEDICATIONS  (PRN):  morphine  - Injectable 3 milliGRAM(s) IV Push every 4 hours PRN Severe Pain (7 - 10)  oxyCODONE    IR 5 milliGRAM(s) Oral every 4 hours PRN Moderate Pain (4 - 6)    ROS: pertinent positives in HPI, all other ROS were reviewed and are negative     PHYSICAL EXAM:  Constitutional: Awake / alert, NAD  HEENT: PERRLA, EOMI, left field cut, head wrap in place, no signs of bleeding   Neck: Supple  Respiratory: Breath sounds are clear bilaterally  Cardiovascular: S1 and S2, regular rhythm  Gastrointestinal: Soft, NT/ND  Extremities:  no edema  Musculoskeletal: no abnormal movements  Skin: No rashes    Neurological Exam:  HF: A x O x 3, appropriately interactive, normal affect, speech fluent, no aphasia or paraphasic errors. Naming/repetition intact   CN: PERRL, EOMI, left hemitomias hemianopia,  facial sensation normal, no NLFD, tongue midline  Motor: No pronator drift, Strength 5/5 in all 4 ext, normal bulk and tone, no tremor, rigidity or bradykinesia  Sens: Intact to light touch  Reflexes: Symmetric and normal, downgoing toes b/l  Coord:  No FNFA, dysmetria, DELTA intact   Gait/Balance: not tested     LABS:                         12.1   16.50 )-----------( 207      ( 24 May 2024 05:22 )             34.5     05-24    137  |  107  |  25<H>  ----------------------------<  160<H>  4.0   |  24  |  1.22    Ca    9.2      24 May 2024 05:22    TPro  6.6  /  Alb  3.3  /  TBili  0.2  /  DBili  x   /  AST  29  /  ALT  23  /  AlkPhos  56  05-24    LIVER FUNCTIONS - ( 24 May 2024 05:22 )  Alb: 3.3 g/dL / Pro: 6.6 gm/dL / ALK PHOS: 56 U/L / ALT: 23 U/L / AST: 29 U/L / GGT: x           RADIOLOGY:  < from: CT Head No Cont (05.23.24 @ 18:13) >  Since the prior exams, a right parietal vertex craniotomy has been   performed, with a thin epidural fluid collection beneath the flap   consistent with sealant material/postsurgical change. There are mild   blood products within the resection cavity, and surrounding vasogenic   edema. No convincing evidence of residual neoplasm by CT, although MRI   would be more sensitive. There is nondependent right subdural gas on a   postoperative basis.    Moderate generalized cerebral volume loss, with distention of the sulci   and concomitant ex-vacuo ventricular dilatation. Mild nonspecific low   attenuation in the periventricular and subcortical white matter.    No acute intracranial hemorrhage. No midline shift or herniation. No CT   evidence of acute territorial infarction, although MRI with DWI would be   more sensitive.    There is a small amount of fluid in the sphenoid sinuses, with polypoid   mucosal densities in the nasal cavity and ethmoids anteriorly. The right   frontal sinus is opacified. The mastoids are clear.    Limited views of the orbits and visualized soft tissues of the neck,   face, scalp, skull base, and calvarium are otherwise unremarkable.    IMPRESSION:    1.  Postsurgical changes without remote intracranial hemorrhage.

## 2024-05-24 NOTE — PHYSICAL THERAPY INITIAL EVALUATION ADULT - GENERAL OBSERVATIONS, REHAB EVAL
Pt found supine in bed wit gauze wrapping head, ICU monitors, A-line, fajardo, bilateral venodynes, and bed alarm activated. Pt c/o HA 4/10. Pt found supine in bed with gauze wrapping head, ICU monitors, A-line, fajardo, bilateral venodynes, and bed alarm activated. Pt c/o HA 4/10.

## 2024-05-24 NOTE — PHYSICAL THERAPY INITIAL EVALUATION ADULT - PLANNED THERAPY INTERVENTIONS, PT EVAL
Stair mobility. Eval, amb, transfers, bed mobility x 15'. Pt OOB in chair with all observation section equipment/material intact. Nursing informed of pt OOB in chair without chair alarm. Pt instructed to not get up on his own and to utilize CB if he needs to get OOC. Pt c/o HA 4/10. Will cont to follow./bed mobility training/gait training/transfer training

## 2024-05-24 NOTE — PROGRESS NOTE ADULT - ASSESSMENT
Patient with hx. htn, aflutter s/p ablation(not oon AC)  esophageal ca, with mets  now with recurrent brain mass  s/p resection  grossly neurologically in tact  post op scan ok  keppra  decadron  venodynes  analgesia prn  periop cephazolin
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 He presented through ambulatory surgery for a scheduled procedure. Pt under went a left sided occipital craniotomy for resection of tumor.    # left occipital craniotomy for resection of tumor     Plan-  Advance diet and activity as tolerated   Pain meds as needed   Maintain SBP < 150   d/c fajardo and d/c a-line   Continue decadron and Keppra   MRI with and without contrast   Physical therapy as tolerated   Venodynes for DVT PPX     Discussed with Dr. Novoa who agrees with plan 
84 y/o male PMH metastatic esophageal ca, s/p chemo/radiation, s/p R occipital craniotomy with resection of mass in 4/20222 (path consistent with esophageal met), Andre s/p ablation, HLD, BPH, HTN    Now admitted for and underwent R occipital craniotomy and resection of mass on 5/23, frozen section consistent with adenoca (esophageal met)    Admitted to ICU post-op for frequent monitoring     Stable from clinical and neurological standpoint       Plan:     Pain mx with PRN morphine, oxy   IS   PT eval, OOB   Neurochecks q4    Keppra, dexamethaxone   F/u surgical path     PO diet   Bowel regimen     DVT ppx with SCDs, start chemical when ok with neurosx       Stable for 1N, d/w neurosx

## 2024-05-24 NOTE — PHYSICAL THERAPY INITIAL EVALUATION ADULT - PERTINENT HX OF CURRENT PROBLEM, REHAB EVAL
Pt admitted to  secondary to blurry vision and for sx as above. Pt with a hx of esophageal ca s/p esophagectomy with chemo and radiation. Pt found to have mets to lung 2017 s/p sx and chemo. Pt then developed L peripheral vision loss. Found to have mets to right side of brain 2022. Pt then had surgical resection following finding. Recently pt developed blurry vision. Recent scan showed recurrent right brain mass.

## 2024-05-25 ENCOUNTER — TRANSCRIPTION ENCOUNTER (OUTPATIENT)
Age: 84
End: 2024-05-25

## 2024-05-25 VITALS
TEMPERATURE: 99 F | RESPIRATION RATE: 18 BRPM | SYSTOLIC BLOOD PRESSURE: 113 MMHG | OXYGEN SATURATION: 99 % | DIASTOLIC BLOOD PRESSURE: 46 MMHG | HEART RATE: 79 BPM

## 2024-05-25 LAB
ANION GAP SERPL CALC-SCNC: 5 MMOL/L — SIGNIFICANT CHANGE UP (ref 5–17)
BUN SERPL-MCNC: 25 MG/DL — HIGH (ref 7–23)
CALCIUM SERPL-MCNC: 9.3 MG/DL — SIGNIFICANT CHANGE UP (ref 8.5–10.1)
CHLORIDE SERPL-SCNC: 107 MMOL/L — SIGNIFICANT CHANGE UP (ref 96–108)
CO2 SERPL-SCNC: 28 MMOL/L — SIGNIFICANT CHANGE UP (ref 22–31)
CREAT SERPL-MCNC: 1.2 MG/DL — SIGNIFICANT CHANGE UP (ref 0.5–1.3)
EGFR: 60 ML/MIN/1.73M2 — SIGNIFICANT CHANGE UP
GLUCOSE BLDC GLUCOMTR-MCNC: 125 MG/DL — HIGH (ref 70–99)
GLUCOSE SERPL-MCNC: 130 MG/DL — HIGH (ref 70–99)
HCT VFR BLD CALC: 34.8 % — LOW (ref 39–50)
HGB BLD-MCNC: 11.7 G/DL — LOW (ref 13–17)
MAGNESIUM SERPL-MCNC: 1.8 MG/DL — SIGNIFICANT CHANGE UP (ref 1.6–2.6)
MCHC RBC-ENTMCNC: 32.1 PG — SIGNIFICANT CHANGE UP (ref 27–34)
MCHC RBC-ENTMCNC: 33.6 GM/DL — SIGNIFICANT CHANGE UP (ref 32–36)
MCV RBC AUTO: 95.3 FL — SIGNIFICANT CHANGE UP (ref 80–100)
PHOSPHATE SERPL-MCNC: 2.7 MG/DL — SIGNIFICANT CHANGE UP (ref 2.5–4.5)
PLATELET # BLD AUTO: 200 K/UL — SIGNIFICANT CHANGE UP (ref 150–400)
POTASSIUM SERPL-MCNC: 4.3 MMOL/L — SIGNIFICANT CHANGE UP (ref 3.5–5.3)
POTASSIUM SERPL-SCNC: 4.3 MMOL/L — SIGNIFICANT CHANGE UP (ref 3.5–5.3)
RBC # BLD: 3.65 M/UL — LOW (ref 4.2–5.8)
RBC # FLD: 13.5 % — SIGNIFICANT CHANGE UP (ref 10.3–14.5)
SODIUM SERPL-SCNC: 140 MMOL/L — SIGNIFICANT CHANGE UP (ref 135–145)
WBC # BLD: 13.62 K/UL — HIGH (ref 3.8–10.5)
WBC # FLD AUTO: 13.62 K/UL — HIGH (ref 3.8–10.5)

## 2024-05-25 RX ORDER — DEXAMETHASONE 0.5 MG/5ML
1 ELIXIR ORAL
Qty: 5 | Refills: 0
Start: 2024-05-25 | End: 2024-05-29

## 2024-05-25 RX ORDER — MELOXICAM 15 MG/1
1 TABLET ORAL
Refills: 0 | DISCHARGE

## 2024-05-25 RX ORDER — LEVETIRACETAM 250 MG/1
1 TABLET, FILM COATED ORAL
Qty: 60 | Refills: 0
Start: 2024-05-25 | End: 2024-06-23

## 2024-05-25 RX ORDER — OXYCODONE HYDROCHLORIDE 5 MG/1
1 TABLET ORAL
Qty: 28 | Refills: 0
Start: 2024-05-25 | End: 2024-05-31

## 2024-05-25 RX ORDER — SENNA PLUS 8.6 MG/1
1 TABLET ORAL
Qty: 0 | Refills: 0 | DISCHARGE
Start: 2024-05-25

## 2024-05-25 RX ADMIN — OXYCODONE HYDROCHLORIDE 5 MILLIGRAM(S): 5 TABLET ORAL at 11:46

## 2024-05-25 RX ADMIN — CHLORHEXIDINE GLUCONATE 1 APPLICATION(S): 213 SOLUTION TOPICAL at 09:44

## 2024-05-25 RX ADMIN — OXYCODONE HYDROCHLORIDE 5 MILLIGRAM(S): 5 TABLET ORAL at 08:42

## 2024-05-25 RX ADMIN — LEVETIRACETAM 500 MILLIGRAM(S): 250 TABLET, FILM COATED ORAL at 09:43

## 2024-05-25 RX ADMIN — OXYCODONE HYDROCHLORIDE 5 MILLIGRAM(S): 5 TABLET ORAL at 07:45

## 2024-05-25 RX ADMIN — OXYCODONE HYDROCHLORIDE 5 MILLIGRAM(S): 5 TABLET ORAL at 12:45

## 2024-05-25 RX ADMIN — Medication 4 MILLIGRAM(S): at 09:43

## 2024-05-25 RX ADMIN — Medication 1 TABLET(S): at 09:43

## 2024-05-25 RX ADMIN — PANTOPRAZOLE SODIUM 40 MILLIGRAM(S): 20 TABLET, DELAYED RELEASE ORAL at 09:43

## 2024-05-25 RX ADMIN — POLYETHYLENE GLYCOL 3350 17 GRAM(S): 17 POWDER, FOR SOLUTION ORAL at 09:44

## 2024-05-25 NOTE — DISCHARGE NOTE PROVIDER - NSDCCPCAREPLAN_GEN_ALL_CORE_FT
PRINCIPAL DISCHARGE DIAGNOSIS  Diagnosis: Brain mass  Assessment and Plan of Treatment: 1) Please follow up with Dr. Novoa in 2 weeks as instruceted. If you need to reschedule or make an appointment, Please call their office.  Your incision will be evaluated at this appointment. Any sutures or staples may be removed.    2) You may shower tomorrow but please keep incision clean and dry, do not submerge wound in water for prolonged periods of time, pat dry after showering, and do not use any creams or ointments to incision.  3) You are being discharged on pain medications; these may make you drowy and/or constipated.  Please take over the counter stool softners to avoid straining.  Please read the instructions on the prescription and take medications accordingly.  4) You have been started on a new medication, KEPPRA.  Keppra helps control and prevent seizures.  The most common side effects include diarrhea or constipation, excessive sleepiness, irritability and mood changes.  Rare and sometimes serious side effects include rash.  5) You have been started on a new medication, DECADRON for 5 days.  Decadron helps with swelling.  Some common side effects of decadron include increased appetite, irritability, difficulty sleeping, swelling in your ankles, heartburn, and increased sugars.  Please notify your doctor of any side effects.  6) Please continue home medications.  Please follow up with your Primary Care Physician as it is important to keep them updated on your health. Please call their office to make appointment.

## 2024-05-25 NOTE — DISCHARGE NOTE PROVIDER - NSDCMRMEDTOKEN_GEN_ALL_CORE_FT
alfuzosin 10 mg oral tablet, extended release: 2 tab(s) orally once a day  cholecalciferol 50 mcg (2000 intl units) oral tablet: 1 tab(s) orally once a day  dicyclomine 10 mg oral capsule: 1 cap(s) orally Patient takes  4 times a week---Sunday/Tuesday/Thursday/Saturday  Multiple Vitamins oral tablet: 1 tab(s) orally once a day  omeprazole 20 mg oral delayed release tablet: 1 tab(s) orally once a day  PreserVision AREDS 2 oral capsule: 1 capsule orally 2 times a day  rosuvastatin 20 mg oral tablet: 1 tab(s) orally once a day  senna leaf extract oral tablet: 1 tab(s) orally once a day (at bedtime) as needed for  constipation   alfuzosin 10 mg oral tablet, extended release: 2 tab(s) orally once a day  cholecalciferol 50 mcg (2000 intl units) oral tablet: 1 tab(s) orally once a day  dexAMETHasone 2 mg oral tablet: 1 tab(s) orally once a day  dicyclomine 10 mg oral capsule: 1 cap(s) orally Patient takes  4 times a week---Sunday/Tuesday/Thursday/Saturday  levETIRAcetam 500 mg oral tablet: 1 tab(s) orally 2 times a day  Multiple Vitamins oral tablet: 1 tab(s) orally once a day  omeprazole 20 mg oral delayed release tablet: 1 tab(s) orally once a day  PreserVision AREDS 2 oral capsule: 1 capsule orally 2 times a day  rosuvastatin 20 mg oral tablet: 1 tab(s) orally once a day  senna leaf extract oral tablet: 1 tab(s) orally once a day (at bedtime) as needed for  constipation   alfuzosin 10 mg oral tablet, extended release: 2 tab(s) orally once a day  cholecalciferol 50 mcg (2000 intl units) oral tablet: 1 tab(s) orally once a day  dexAMETHasone 2 mg oral tablet: 1 tab(s) orally once a day  dicyclomine 10 mg oral capsule: 1 cap(s) orally Patient takes  4 times a week---Sunday/Tuesday/Thursday/Saturday  levETIRAcetam 500 mg oral tablet: 1 tab(s) orally 2 times a day  Multiple Vitamins oral tablet: 1 tab(s) orally once a day  omeprazole 20 mg oral delayed release tablet: 1 tab(s) orally once a day  oxyCODONE 5 mg oral tablet: 1 tab(s) orally every 6 hours as needed for Moderate Pain (4 - 6) MDD: 4  PreserVision AREDS 2 oral capsule: 1 capsule orally 2 times a day  rosuvastatin 20 mg oral tablet: 1 tab(s) orally once a day  senna leaf extract oral tablet: 1 tab(s) orally once a day (at bedtime) as needed for  constipation

## 2024-05-25 NOTE — DISCHARGE NOTE NURSING/CASE MANAGEMENT/SOCIAL WORK - NSTRANSFERBELONGINGSDISPO_GEN_A_NUR
Disp Refills Start End SONIA   lisinopril (PRINIVIL/ZESTRIL) 10 MG tablet 90 tablet 1 1/31/2017  No   Sig: Take 1 tablet (10 mg) by mouth daily           Last Written Prescription Date: 01/31/2017  Last Fill Quantity: 90, # refills: 1  Last Office Visit with [facility], P or [facility] prescribing provider: 11/2016       Potassium   Date Value Ref Range Status   09/13/2016 4.3 3.4 - 5.3 mmol/L Final     Creatinine   Date Value Ref Range Status   09/13/2016 0.72 0.52 - 1.04 mg/dL Final     BP Readings from Last 3 Encounters:   01/23/17 (!) 151/96   11/07/16 149/83   09/13/16 135/77        with patient

## 2024-05-25 NOTE — DISCHARGE NOTE PROVIDER - NSDCCPTREATMENT_GEN_ALL_CORE_FT
PRINCIPAL PROCEDURE  Procedure: Craniotomy, using frameless stereotaxy, for tumor resection  Findings and Treatment: 5/23/24 with Dr Novoa

## 2024-05-25 NOTE — DISCHARGE NOTE PROVIDER - CARE PROVIDER_API CALL
Rene Novoa  Neurosurgery  [address] [place] [id number]  Phone: [phone]  Fax: [phone]  Follow Up Time:

## 2024-05-25 NOTE — DISCHARGE NOTE NURSING/CASE MANAGEMENT/SOCIAL WORK - NSDCPEFALRISK_GEN_ALL_CORE
For information on Fall & Injury Prevention, visit: [email or web address] OR  [email or web address] OR  https://www.cdc.gov/steadi/patient.html

## 2024-05-25 NOTE — DISCHARGE NOTE PROVIDER - HOSPITAL COURSE
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 He presented through ambulatory surgery for a scheduled procedure. Pt under went a left sided occipital craniotomy for resection of tumor. Pt tolerated the procedure and anesthesia well, he was recovered in PACU then sent to the ICU overnight.    5/24- POD #1, pt seen and examined in the ICU, no event overnight, c/o headache, no nausea or vomiting, blood pressure stable. Continue decadron and Keppra.   5/25 Patient seen and examined, wife at bedside.  Incision will minor bloody discharge, staple applied for hemostasis.  Discharge medications reviewed with patient and wife.  Patient has rolling walker at home per PT recommendations.  Pain under control and stable for discharge.

## 2024-05-25 NOTE — DISCHARGE NOTE NURSING/CASE MANAGEMENT/SOCIAL WORK - PATIENT PORTAL LINK FT
You can access the FollowMyHealth Patient Portal offered by [facility] by registering at the following website: [email or web address]. By joining [employer]’s FollowMyHealth portal, you will also be able to view your health information using other applications (apps) compatible with our system.

## 2024-05-28 LAB — SURGICAL PATHOLOGY STUDY: SIGNIFICANT CHANGE UP

## 2024-05-29 PROBLEM — H53.452 OTHER LOCALIZED VISUAL FIELD DEFECT, LEFT EYE: Chronic | Status: ACTIVE | Noted: 2024-05-15

## 2024-06-04 ENCOUNTER — APPOINTMENT (OUTPATIENT)
Dept: NEUROSURGERY | Facility: CLINIC | Age: 84
End: 2024-06-04
Payer: MEDICARE

## 2024-06-04 VITALS
OXYGEN SATURATION: 97 % | HEIGHT: 67 IN | DIASTOLIC BLOOD PRESSURE: 85 MMHG | HEART RATE: 80 BPM | SYSTOLIC BLOOD PRESSURE: 136 MMHG | BODY MASS INDEX: 28.09 KG/M2 | WEIGHT: 179 LBS

## 2024-06-04 DIAGNOSIS — H53.40 UNSPECIFIED VISUAL FIELD DEFECTS: ICD-10-CM

## 2024-06-04 DIAGNOSIS — C79.31 SECONDARY MALIGNANT NEOPLASM OF BRAIN: ICD-10-CM
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
## 2024-06-04 DIAGNOSIS — Z87.891 PERSONAL HISTORY OF NICOTINE DEPENDENCE: ICD-10-CM

## 2024-06-04 DIAGNOSIS — Z98.890 OTHER SPECIFIED POSTPROCEDURAL STATES: ICD-10-CM

## 2024-06-04 DIAGNOSIS — Z78.9 OTHER SPECIFIED HEALTH STATUS: ICD-10-CM

## 2024-06-04 DIAGNOSIS — I67.89 OTHER CEREBROVASCULAR DISEASE: ICD-10-CM

## 2024-06-04 PROCEDURE — 99024 POSTOP FOLLOW-UP VISIT: CPT

## 2024-06-05 NOTE — PROCEDURE
[FreeTextEntry1] : Cranial keya removed by Kim Lombardo, NP.   No drainage, no redness or swelling.  Site has small area of superficial dehiscence and skin intact.

## 2024-06-05 NOTE — REASON FOR VISIT
[Spouse] : spouse [Family Member] : family member [de-identified] : 05/23/2024 [de-identified] : 2 weeks [de-identified] : Right Occipital Craniotomy and resection of recurrent brain tumor with stealth

## 2024-06-05 NOTE — CONSULT LETTER
[Dear  ___] : Dear  [unfilled], [Courtesy Letter:] : I had the pleasure of seeing your patient, [unfilled], in my office today. [Referral Closing:] : Thank you very much for seeing this patient.  If you have any questions, please do not hesitate to contact me. [Sincerely,] : Sincerely, [FreeTextEntry2] : Prateek Lindsey  [place] [phone] [FreeTextEntry1] : This very pleasant now 83-year-old gentleman returns to our office for routine post-surgical follow-up. The patient, as you know, was initially diagnosed with esophageal adenocarcinoma. The patient underwent cranial right occipital resection of a metastasis with good success. Unfortunately, recently the patient was having increasing problems with headaches and visual changes and updated surveillance imaging revealed a rapid recurrence of mass in the right occipital region down to the ventricle. The patient was taken to the operating room on May 23, 2024 at [facility] for a gross total resection of the recurrent tumor. The patient did extremely well in hospital and was discharged home on post-operative day three. The patient indicates persistent left visual field cut. He has not had any problems with headaches. There are no new symptoms of imbalance or focal weakness or sensory changes. He has not had any fever or chills. He denies any concerns with his incision such as leakage or focal pain.   On examination the patient is walking well without any assistive device. The patient has an intact homonymous hemianopsia with left visual field cut. The remainder of his vision exam is normal. The patient has a good reading capability wearing his corrective eyeglasses. Pupils are equal and reactive to light. reactive to light. Eye movements are conjugate and full. There is no nystagmus. Face sensation and movement are symmetric and normal. There is no pronate or drift. The patient's neck is supple. The patient is ambulating without any assistive device. The incision line has healed extremely well with no signs of redness, swelling, or fluctuance. The surgical staples were removed without complication.   I have reviewed directly with the patient and his family who are present in support. His post-operative MRI scan performed on the 25th of May, 2024. It shows a very good resection with no significant enhancement.  Overall, I'm very pleased with the patient's early recovery. I have discussed that the final pathology confirms recurrent metastatic esophageal adenocarcinoma consistent with his original tumor. In discussion with medical oncology, specimens are sent forward for Foundation 1 genomic profiling. We will review the patient's case in Tumor Board to evaluate if there are additional options for further radiation and possibly targeted or immunotherapy options.   Thank you for kindly including me in the evaluation and ongoing support of your patient. Please do not hesitate to contact me should you have any questions or concerns regarding this evaluation or the patient's ongoing follow-up care plan. [FreeTextEntry3] : Rene Novoa MD, PhD, FRCPSC  Attending Neurosurgeon   of Neurosurgery  [facility]  [address], 2nd [place] [address]  Office: [phone]  Fax: [phone]

## 2024-06-18 ENCOUNTER — APPOINTMENT (OUTPATIENT)
Dept: NEUROLOGY | Facility: CLINIC | Age: 84
End: 2024-06-18

## 2024-06-20 ENCOUNTER — NON-APPOINTMENT (OUTPATIENT)
Age: 84
End: 2024-06-20

## 2024-07-18 ENCOUNTER — OUTPATIENT (OUTPATIENT)
Dept: OUTPATIENT SERVICES | Facility: HOSPITAL | Age: 84
LOS: 1 days | End: 2024-07-18
Payer: MEDICARE

## 2024-07-18 ENCOUNTER — APPOINTMENT (OUTPATIENT)
Dept: MRI IMAGING | Facility: CLINIC | Age: 84
End: 2024-07-18

## 2024-07-18 DIAGNOSIS — Z90.89 ACQUIRED ABSENCE OF OTHER ORGANS: Chronic | ICD-10-CM

## 2024-07-18 DIAGNOSIS — Z01.89 ENCOUNTER FOR OTHER SPECIFIED SPECIAL EXAMINATIONS: Chronic | ICD-10-CM

## 2024-07-18 DIAGNOSIS — Z98.1 ARTHRODESIS STATUS: Chronic | ICD-10-CM

## 2024-07-18 DIAGNOSIS — Z98.890 OTHER SPECIFIED POSTPROCEDURAL STATES: Chronic | ICD-10-CM

## 2024-07-18 DIAGNOSIS — Z41.9 ENCOUNTER FOR PROCEDURE FOR PURPOSES OTHER THAN REMEDYING HEALTH STATE, UNSPECIFIED: Chronic | ICD-10-CM

## 2024-07-18 DIAGNOSIS — Z98.41 CATARACT EXTRACTION STATUS, RIGHT EYE: Chronic | ICD-10-CM

## 2024-07-18 DIAGNOSIS — Z85.118 PERSONAL HISTORY OF OTHER MALIGNANT NEOPLASM OF BRONCHUS AND LUNG: Chronic | ICD-10-CM

## 2024-07-18 DIAGNOSIS — Z90.49 ACQUIRED ABSENCE OF OTHER SPECIFIED PARTS OF DIGESTIVE TRACT: Chronic | ICD-10-CM

## 2024-07-18 DIAGNOSIS — C79.31 SECONDARY MALIGNANT NEOPLASM OF BRAIN: ICD-10-CM

## 2024-07-18 DIAGNOSIS — S62.101A FRACTURE OF UNSPECIFIED CARPAL BONE, RIGHT WRIST, INITIAL ENCOUNTER FOR CLOSED FRACTURE: Chronic | ICD-10-CM

## 2024-07-18 DIAGNOSIS — Z87.19 PERSONAL HISTORY OF OTHER DISEASES OF THE DIGESTIVE SYSTEM: Chronic | ICD-10-CM

## 2024-07-18 PROCEDURE — 70553 MRI BRAIN STEM W/O & W/DYE: CPT

## 2024-07-18 PROCEDURE — 70553 MRI BRAIN STEM W/O & W/DYE: CPT | Mod: 26,MH

## 2024-07-18 PROCEDURE — A9585: CPT

## 2024-07-23 ENCOUNTER — APPOINTMENT (OUTPATIENT)
Dept: NEUROLOGY | Facility: CLINIC | Age: 84
End: 2024-07-23
Payer: MEDICARE

## 2024-07-23 ENCOUNTER — APPOINTMENT (OUTPATIENT)
Dept: NEUROLOGY | Facility: CLINIC | Age: 84
End: 2024-07-23
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
## 2024-07-23 DIAGNOSIS — C79.31 SECONDARY MALIGNANT NEOPLASM OF BRAIN: ICD-10-CM

## 2024-07-23 DIAGNOSIS — I67.89 OTHER CEREBROVASCULAR DISEASE: ICD-10-CM

## 2024-07-23 PROCEDURE — 99215 OFFICE O/P EST HI 40 MIN: CPT
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
## 2024-07-23 NOTE — DATA REVIEWED
[de-identified] : Serial MRI personally reviewed including 4/24, 2/24 compared with 10/23, 8/23, 5/23, 3/23, 11/22, 8/22, 6/22, and 4/22, there is progressive amorphous enhancement with more solid medial component, with some hyperperfusion and more edema, 5/24 MRI with resection of tumor, progressive enhancement on 7/24 MRI  reviewed with neuroradiology by phone today

## 2024-07-23 NOTE — DISCUSSION/SUMMARY
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 Unfortunately he progressed in the brain and after resection has progressed again.  Discussed options including ICI and possibly re-RT with VADIM.  Unfortunately the brain mets study closed.   discussed ICI with Dr. Vences who will coordinate a visit May refer for RT would be with concurrent VADIM as protection against radionecrosis Will send NGS and PDL1 testing Will call later in week to discuss plan

## 2024-07-29 ENCOUNTER — APPOINTMENT (OUTPATIENT)
Dept: NEUROSURGERY | Facility: CLINIC | Age: 84
End: 2024-07-29
Payer: MEDICARE

## 2024-07-29 VITALS
DIASTOLIC BLOOD PRESSURE: 82 MMHG | HEIGHT: 66 IN | OXYGEN SATURATION: 98 % | SYSTOLIC BLOOD PRESSURE: 174 MMHG | WEIGHT: 168 LBS | HEART RATE: 74 BPM | BODY MASS INDEX: 27 KG/M2

## 2024-07-29 DIAGNOSIS — H53.40 UNSPECIFIED VISUAL FIELD DEFECTS: ICD-10-CM

## 2024-07-29 DIAGNOSIS — Z98.890 OTHER SPECIFIED POSTPROCEDURAL STATES: ICD-10-CM

## 2024-07-29 PROCEDURE — 99024 POSTOP FOLLOW-UP VISIT: CPT

## 2024-07-30 NOTE — REASON FOR VISIT
[Spouse] : spouse [de-identified] : right occipital craniotomy and resection of recurrent metastasis  - esophageal adenocarcinoma primary  [de-identified] : 5/23/24 [de-identified] : 9

## 2024-07-30 NOTE — DATA REVIEWED
[de-identified] : Reviewed updated MRI scan (07/28/2024) and compared them to the post-operative study in May.
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
## 2024-07-30 NOTE — DATA REVIEWED
[de-identified] : Reviewed updated MRI scan (07/28/2024) and compared them to the post-operative study in May.

## 2024-07-30 NOTE — REASON FOR VISIT
[Spouse] : spouse [de-identified] : right occipital craniotomy and resection of recurrent metastasis  - esophageal adenocarcinoma primary  [de-identified] : 5/23/24 [de-identified] : 9

## 2024-07-31 ENCOUNTER — NON-APPOINTMENT (OUTPATIENT)
Age: 84
End: 2024-07-31

## 2024-07-31 ENCOUNTER — OUTPATIENT (OUTPATIENT)
Dept: OUTPATIENT SERVICES | Facility: HOSPITAL | Age: 84
LOS: 1 days | Discharge: HOME | End: 2024-07-31

## 2024-07-31 DIAGNOSIS — Z98.890 OTHER SPECIFIED POSTPROCEDURAL STATES: Chronic | ICD-10-CM

## 2024-07-31 DIAGNOSIS — Z87.19 PERSONAL HISTORY OF OTHER DISEASES OF THE DIGESTIVE SYSTEM: Chronic | ICD-10-CM

## 2024-07-31 DIAGNOSIS — Z90.49 ACQUIRED ABSENCE OF OTHER SPECIFIED PARTS OF DIGESTIVE TRACT: Chronic | ICD-10-CM

## 2024-07-31 DIAGNOSIS — S62.101A FRACTURE OF UNSPECIFIED CARPAL BONE, RIGHT WRIST, INITIAL ENCOUNTER FOR CLOSED FRACTURE: Chronic | ICD-10-CM

## 2024-07-31 DIAGNOSIS — Z01.89 ENCOUNTER FOR OTHER SPECIFIED SPECIAL EXAMINATIONS: Chronic | ICD-10-CM

## 2024-07-31 DIAGNOSIS — C15.9 MALIGNANT NEOPLASM OF ESOPHAGUS, UNSPECIFIED: ICD-10-CM

## 2024-07-31 DIAGNOSIS — Z98.1 ARTHRODESIS STATUS: Chronic | ICD-10-CM

## 2024-07-31 DIAGNOSIS — Z90.89 ACQUIRED ABSENCE OF OTHER ORGANS: Chronic | ICD-10-CM

## 2024-07-31 DIAGNOSIS — Z98.41 CATARACT EXTRACTION STATUS, RIGHT EYE: Chronic | ICD-10-CM

## 2024-07-31 DIAGNOSIS — Z41.9 ENCOUNTER FOR PROCEDURE FOR PURPOSES OTHER THAN REMEDYING HEALTH STATE, UNSPECIFIED: Chronic | ICD-10-CM

## 2024-07-31 DIAGNOSIS — Z85.118 PERSONAL HISTORY OF OTHER MALIGNANT NEOPLASM OF BRONCHUS AND LUNG: Chronic | ICD-10-CM

## 2024-08-05 ENCOUNTER — APPOINTMENT (OUTPATIENT)
Dept: HEMATOLOGY ONCOLOGY | Facility: CLINIC | Age: 84
End: 2024-08-05

## 2024-08-05 PROCEDURE — 99215 OFFICE O/P EST HI 40 MIN: CPT

## 2024-08-05 NOTE — RESULTS/DATA
[FreeTextEntry1] : Recent brain MRI 7/18/24 IMPRESSION: Please note MR perfusion was not performed due to protocol error. Slightly increased prominence of thin peripheral enhancement surrounding right occipital resection cavity. This could represent treatment-related changes, but attention on follow-up is recommended to exclude recurrent neoplasm.

## 2024-08-05 NOTE — PHYSICAL EXAM
[Normal] : grossly intact [de-identified] : looks well  [de-identified] : walks slowly without cane or walker  [de-identified] : has persistent L peripheral visual field deficit

## 2024-08-05 NOTE — HISTORY OF PRESENT ILLNESS
[Disease: _____________________] : Disease: [unfilled] [de-identified] : Presented in 2015 with chest/ epigastric pain.EGD on 7/24/15 showed malignant ulcerated mass in lower third of esophagus. Biopsy c/w infiltrating moderately differentiated adenocarcinoma. EUS :  T3 N2 nonobstructing distal esophageal cancer . PET/CT no evidence of distant mets. S/p preoperative chemoRT with Taxol/ Carboplatin. On 11/11/15 he underwent left  VATS esophagogastrectomy with abdominal [facility]adenectomy at [facility].   SUrgical pathology showed  residual 2.5 mm single focus  of moderately differentiated  adenocarcinoma invading muscularis propria, three  of 27 LN with metastatic disease.  Received 2 additional cycles of systemic adjuvant chemotherapy with Taxol and carboplatin- completed 2/19/16.  Surveillance scans showed tiny bilateral  pulmonary nodules. Right lung nodules were stable but left lung nodules- enlarges on follow up scans. On 8/25/17 he underwent wedge resection of two left lung pulmonary nodules ( Dr Luis Antonio [facility])   Pathology showed metastatic adenocarcinoma compatible with esophageal primary: 9 mm and 8 mm tumors with necrosis.   S/p " second adjuvant " chemotherapy ( minimal residual disease on scans )- Xeloda/ Oxaliplatin- Sept 2017- Feb 2018  Oxaliplatin stopped early December ( after 5 cycles) due to neuropathy.     CT CAP 9/29/21  : Y[facility]Y  In 2022 developed left sided visual field loss.  MRI of brain with sabine 4/15/22 : large mixed cystic solid mass R parietal 4 x 3.5 x 2.8 cm with extensive edema CT CAP with contrast 4/15/22 no evidence of metastatic disease  s/p partial resection of R occipital mass 4/19/22 ( Dr Novoa) : metastatic  poorly diff adenocarcinoma with necrosis c/w patient's history of esophageal carcinoma.  F/u MRI  residual enhancing neoplasm noted superiorly. SRS to brain 6/13/22 ( 2700 cGy )- Dr Resendiz MRI brain Nov 2022- continuous improvement. No new lesions.   MR head 3/15/23  postop changes  , increase in peripherally enhancing lesion- treatment related changes versus POD . reviewed by Dr Rollins- c/w radiation necrosis. CT CAP 4/4/23 YURY   Seen by  neurooncology  Dr Rollins.  6/7/23 - 8/9/23  helga 7.5 mg/kg q 21 days x 4 cycles for radiation necrosis   MRI  brain 8/22/23  IMPRESSION: When compared with prior 5/16/2023,  lesion has decreased CBV and decreased CBF on perfusion more consistent with treatment-related changes. Diffusion hyperintensity with restricted diffusion on the ADC map in association with the lesion likely related to lesional devascularization.  MRI brain 4/15/24 report : increase in thickness and enhancement of R parietooccipital lesion : concern for POD versus treatment related changes. CT CAP April 2024- no evidence of metastatic disease.  May 2024 Resection of progressive tumor  ( Dr Novoa)  Original brain pathology showed CDKN2A/B mutation and TMB 14. Seen by Dr Rollins neuroonc- options : reRT, immunotherapy or clinical trial ( had CDK mutation) Unfortunately clinical trial now closed for enrollment. Repeat FoundationOne / TMB sent on brain tissue from May 2024.   MMR was stable on May 2024 pathology. Consideration for IO if still with high TMB. FoundationOne on tissue from MAy 2024 brain tumor resection showed TMB 17.    [de-identified] : moderately differentiated adenocarcinoma [de-identified] : HER 2 negative [de-identified] : FOundationOne from resected  metastatic brain tumor  in May 2024-  TMB 17 Muts/Mb   MS stable  BRCA2 , TP53 CDKN2A and few additional mutations - no establish targets for therapy  in his tumor type  [de-identified] : Stable. Persistent L visual field loss  Overall vision worse but has cataracts- needs surgery Will have repeated Brain MRI with perfusion next week.
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
## 2024-08-05 NOTE — REVIEW OF SYSTEMS
[Patient Intake Form Reviewed] : Patient intake form was reviewed [Diarrhea: Grade 0] : Diarrhea: Grade 0 [Vision Problems] : vision problems [Negative] : Allergic/Immunologic [FreeTextEntry3] : per HPI  [FreeTextEntry9] : back pain  chronic

## 2024-08-05 NOTE — PHYSICAL EXAM
[Normal] : grossly intact [de-identified] : looks well  [de-identified] : walks slowly without cane or walker  [de-identified] : has persistent L peripheral visual field deficit

## 2024-08-05 NOTE — ASSESSMENT
[FreeTextEntry1] : 84 y/o male with adenocarcinoma of distant esophagus diagnosed in 2015, endoscopic staging T3, N2. S/p neoadjuvant chemoRT ( taxol/ carboplatin), s/p R0 surgical resection on 11/11/15 for residual T2, N2 disease. S/p 2 additional cycles of systemic chemotherapy with Taxol and carboplatin. In 2017 developed low volume multiple pulmonary metastases- s/p wedge resection. S/p " second adjuvant" Xeloda/ Oxaliplatin- completed in February 2018. Remained YURY for five years  April 2022 diagnosed with large solitary metastasis to right occipital lobe s/p surgical resection. Unusual course of his disease with CNS relapse seven years after initial diagnosis. S/p STS to site of brain met.  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 Evaluated by Dr Rollins and bevacizumab recommended.  S/p helga x 4 cycles 6/7/23- 8/9/23  MRI brain 8/22/23 showed improvement.  Most recent scans  chest abd pelvis in April 2024 - YURY.  Follow up Brain MRI April 2024- showed progression of tumor in R peritooccipital region versus worsening RT necrosis.  S/p resection of  progressive brain tumor May 2024.  Not a candidate for re-RT ( Dr Resendiz). [facility] result reviewed. Unfortunately trial for patients with CDK mutated tumor recently colsed to accrual. Discussed option of immunotherapy for patients with metastatic carcinoma and high tumor mutation burden. Discussed in details PD1/ PDL1  imunotherapy and dual immunotherapy with ipi and nivo. risk of toxicity would be much higher with dual  IO. recommend Immunotherapy with pembrolizumab - as  long as no evidece of progression and tolerated. Long discussion about side effects and monitoring.  He is willing to start some sot of systemic therapy.  He has " second opinion" [place] with [place] [facility] [place] He will let me know if he decides to start IO here.

## 2024-08-05 NOTE — ASSESSMENT
[FreeTextEntry1] : 84 y/o male with adenocarcinoma of distant esophagus diagnosed in 2015, endoscopic staging T3, N2. S/p neoadjuvant chemoRT ( taxol/ carboplatin), s/p R0 surgical resection on 11/11/15 for residual T2, N2 disease. S/p 2 additional cycles of systemic chemotherapy with Taxol and carboplatin. In 2017 developed low volume multiple pulmonary metastases- s/p wedge resection. S/p " second adjuvant" Xeloda/ Oxaliplatin- completed in February 2018. Remained YURY for five years  April 2022 diagnosed with large solitary metastasis to right occipital lobe s/p surgical resection. Unusual course of his disease with CNS relapse seven years after initial diagnosis. S/p STS to site of brain met.  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 Evaluated by Dr Rollins and bevacizumab recommended.  S/p helga x 4 cycles 6/7/23- 8/9/23  MRI brain 8/22/23 showed improvement.  Most recent scans  chest abd pelvis in April 2024 - YURY.  Follow up Brain MRI April 2024- showed progression of tumor in R peritooccipital region versus worsening RT necrosis.  S/p resection of  progressive brain tumor May 2024.  Not a candidate for re-RT ( Dr Resendiz). [facility] result reviewed. Unfortunately trial for patients with CDK mutated tumor recently colsed to accrual. Discussed option of immunotherapy for patients with metastatic carcinoma and high tumor mutation burden. Discussed in details PD1/ PDL1  imunotherapy and dual immunotherapy with ipi and nivo. risk of toxicity would be much higher with dual  IO. recommend Immunotherapy with pembrolizumab - as  long as no evidece of progression and tolerated. Long discussion about side effects and monitoring.  He is willing to start some sot of systemic therapy.  He has " second opinion" [place] [facility] [place] He will let me know if he decides to start IO here.

## 2024-08-05 NOTE — REASON FOR VISIT
[Follow-Up Visit] : a follow-up [Other: _____] : [unfilled] [FreeTextEntry2] :  esophageal  cancer   with recurrent brain mets

## 2024-08-06 DIAGNOSIS — C79.31 SECONDARY MALIGNANT NEOPLASM OF BRAIN: ICD-10-CM

## 2024-08-08 ENCOUNTER — RX ONLY (RX ONLY)
Age: 84
End: 2024-08-08

## 2024-08-08 ENCOUNTER — OFFICE (OUTPATIENT)
Dept: URBAN - METROPOLITAN AREA CLINIC 12 | Facility: CLINIC | Age: 84
Setting detail: OPHTHALMOLOGY
End: 2024-08-08
Payer: MEDICARE

## 2024-08-08 DIAGNOSIS — H35.3131: ICD-10-CM

## 2024-08-08 DIAGNOSIS — Z96.1: ICD-10-CM

## 2024-08-08 DIAGNOSIS — H26.493: ICD-10-CM

## 2024-08-08 PROCEDURE — 99204 OFFICE O/P NEW MOD 45 MIN: CPT | Performed by: OPHTHALMOLOGY

## 2024-08-08 PROCEDURE — 92134 CPTRZ OPH DX IMG PST SGM RTA: CPT | Performed by: OPHTHALMOLOGY

## 2024-08-08 ASSESSMENT — CONFRONTATIONAL VISUAL FIELD TEST (CVF)
OD_FINDINGS: FULL
OS_FINDINGS: FULL

## 2024-08-16 ENCOUNTER — APPOINTMENT (OUTPATIENT)
Dept: MRI IMAGING | Facility: CLINIC | Age: 84
End: 2024-08-16

## 2024-08-16 PROCEDURE — 70553 MRI BRAIN STEM W/O & W/DYE: CPT | Mod: 26,MH

## 2024-09-03 ENCOUNTER — APPOINTMENT (OUTPATIENT)
Facility: CLINIC | Age: 84
End: 2024-09-03
Payer: MEDICARE

## 2024-09-03 VITALS
HEART RATE: 68 BPM | BODY MASS INDEX: 27.32 KG/M2 | DIASTOLIC BLOOD PRESSURE: 71 MMHG | HEIGHT: 66 IN | TEMPERATURE: 97.7 F | OXYGEN SATURATION: 99 % | WEIGHT: 170 LBS | SYSTOLIC BLOOD PRESSURE: 109 MMHG

## 2024-09-03 DIAGNOSIS — H53.40 UNSPECIFIED VISUAL FIELD DEFECTS: ICD-10-CM

## 2024-09-03 DIAGNOSIS — C79.31 SECONDARY MALIGNANT NEOPLASM OF BRAIN: ICD-10-CM

## 2024-09-03 PROCEDURE — 99215 OFFICE O/P EST HI 40 MIN: CPT

## 2024-09-05 NOTE — DATA REVIEWED
[de-identified] : Serial MRI personally reviewed including 4/24, 2/24 compared with 10/23, 8/23, 5/23, 3/23, 11/22, 8/22, 6/22, and 4/22, there is progressive amorphous enhancement with more solid medial component, with some hyperperfusion and more edema, 5/24 MRI with resection of tumor, progressive enhancement on 7/24 MRI, further progressed on 8/24 MRI, but mildly so with some hyperperfusion  reviewed with neuroradiology by phone today

## 2024-09-05 NOTE — DISCUSSION/SUMMARY
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 Unfortunately he progressed in the brain and after resection has progressed again.  This is mainly tumor.  I do think the idea of repeat resection and brachytherapy is reasonable at [facility], and discussed this with Dr. Núñez today. Additionally using ICI in interim is reasonable.   New MRI brain for 11/24 coordinated He will discuss ICI with Dr. Vences [facility] 11/24

## 2024-09-05 NOTE — PHYSICAL EXAM
[FreeTextEntry1] : Exam as below (with documented exceptions in parentheses)   General: Healthy appearing male, well groomed and without deformities. KPS is 70.  Mental status: Alert, awake and attentive. Oriented to person, place and time. Recent and remote memory intact. Normal concentration. Fluent spontaneous speech with intact naming and repetition. Normal fund of knowledge.   Cranial Nerves:  II: Full visual fields. III, IV, VI: Pupils round, reactive to light. Full extraocular movements. No nystagmus. V: Normal bilateral bite, facial sensation. VII: No facial weakness. VII: Hearing intact. IX, X: Palate midline, intact gag. XI: Sternocleidomastoids normal. XII: Tongue protrudes midline. No dysarthria. (left homonymous hemianopsia).  Motor: Normal tone, bulk and power throughout including arms and legs, proximal and distal. No pronator drift. No abnormal movements.   Sensation: Normal in arms, legs and trunk to pin, proprioception and vibration. Negative Romberg.   Coordination: No dysmetria or dysdiadochokinesis bilaterally. Normal heel-shin testing.    Reflexes: Normal and symmetric throughout. Absent Babinski bilaterally.   Gait: normal station, able to toe/heel and tandem  Vascular: No peripheral edema/calf tenderness.   Eyes: Fundoscopic exam without papilledema (deferred)   Heart: Regular rate and rhythm. Normal s1-s2. No murmurs, gallops, or rubs.    Respiratory: Lungs clear to auscultation bilaterally.   Abdomen: Nontender, non-distended. No hepatosplenomegaly. Normal bowel sounds in four quadrants.     
SCM
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
## 2024-09-06 ENCOUNTER — RX ONLY (RX ONLY)
Age: 84
End: 2024-09-06

## 2024-09-06 ENCOUNTER — ASC (OUTPATIENT)
Dept: URBAN - METROPOLITAN AREA SURGERY 8 | Facility: SURGERY | Age: 84
Setting detail: OPHTHALMOLOGY
End: 2024-09-06
Payer: MEDICARE

## 2024-09-06 DIAGNOSIS — H26.491: ICD-10-CM

## 2024-09-06 PROCEDURE — 66821 AFTER CATARACT LASER SURGERY: CPT | Mod: RT | Performed by: OPHTHALMOLOGY

## 2024-09-07 PROBLEM — H26.491 POSTERIOR CAPSULAR OPACIFICATION;  , RIGHT EYE: Status: ACTIVE | Noted: 2024-09-06

## 2024-09-25 ENCOUNTER — APPOINTMENT (OUTPATIENT)
Dept: NEUROSURGERY | Facility: CLINIC | Age: 84
End: 2024-09-25
Payer: MEDICARE

## 2024-09-25 DIAGNOSIS — H53.9 UNSPECIFIED VISUAL DISTURBANCE: ICD-10-CM

## 2024-09-25 DIAGNOSIS — H53.40 UNSPECIFIED VISUAL FIELD DEFECTS: ICD-10-CM

## 2024-09-25 DIAGNOSIS — C79.31 SECONDARY MALIGNANT NEOPLASM OF BRAIN: ICD-10-CM

## 2024-09-25 DIAGNOSIS — Z98.890 OTHER SPECIFIED POSTPROCEDURAL STATES: ICD-10-CM

## 2024-09-25 DIAGNOSIS — I67.89 OTHER CEREBROVASCULAR DISEASE: ICD-10-CM
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
## 2024-09-25 PROCEDURE — 99214 OFFICE O/P EST MOD 30 MIN: CPT

## 2024-09-25 NOTE — CONSULT LETTER
[Dear  ___] : Dear  [unfilled], [Courtesy Letter:] : I had the pleasure of seeing your patient, [unfilled], in my office today. [Sincerely,] : Sincerely, [FreeTextEntry1] :  Subjective:   - Summary: Patient had history of metastatic brain tumor that was resected in May of 2024. Lately, he has been expressing concerns of cloudy vision.   - Chief Complaint (CC): Cloudy vision   - History of Present Illness (HPI): Patient has a resected metastatic brain tumor and has completed surveillance by medical oncology and radiation oncology. The patient has been expressing a discomfort in his vision. He compared it to windshield fog.   - Past Medical History: History of metastatic brain tumor.   - Past Surgical History: Surgery for resection of a metastatic brain tumor in May 2024.   - Family History: Not mentioned in the conversation.   - Social History: Not mentioned in the conversation.   - Review of Systems: Positive findings for visual disturbances. The patient has seen a retina specialist and had laser surgery for his left eye.   - Medications: Not mentioned in the conversation.   - Allergies: Not mentioned in the conversation.   Objective:   - Diagnostic Results: The latest MRI scan showed some discrepancies, which could be either due to a growing tumor or inflammation and treatment response. An MR spectroscopy was proposed to identify the real situation.   - Vital Signs: Not mentioned in the conversation.   - Physical Examination (PE): The patient looks physically well and has no complaints except for a visual disturbance described as cloudy vision. incision in right occipital region is well healed.     Assessment:   - Summary: Patient is experiencing cloudy vision which is a concern. As the tumor was on his right side, the main problem would be not seeing things coming from his left-hand side only.   - Problems:     - Possible Recurrence of Tumor     - Impaired Vision   - Differential Diagnosis:     - Growing tumor     - Effects of inflammation and treatment response     - Macular Degeneration   Plan:   - Summary: The goal is to determine if further treatment is needed based on the next MRI scan. If the scan shows it to be positive, other treatment options, including surgery, radiation, and targeted therapies, would be considered. Medical consultation with Dr. Mast and Dr. Silveira from [place] is planned to ensure the best treatment decision.   - Plan:     - Consult with other healthcare providers     - Undergo further tests (MRI and possibly MR spectroscopy) to help determine the diagnosis     - Further ophthalmological consultations with a neuro-ophthalmologist   [FreeTextEntry3] : Rene Novoa MD, PhD, FRCPSC  Attending Neurosurgeon   of Neurosurgery  [facility]  [address], [address] [place] [address]  Office: [phone]  Fax: [phone]

## 2024-10-04 ENCOUNTER — RX ONLY (RX ONLY)
Age: 84
End: 2024-10-04

## 2024-10-04 ENCOUNTER — ASC (OUTPATIENT)
Dept: URBAN - METROPOLITAN AREA SURGERY 8 | Facility: SURGERY | Age: 84
Setting detail: OPHTHALMOLOGY
End: 2024-10-04
Payer: MEDICARE

## 2024-10-04 DIAGNOSIS — H26.492: ICD-10-CM

## 2024-10-04 PROCEDURE — 66821 AFTER CATARACT LASER SURGERY: CPT | Mod: 79,LT | Performed by: OPHTHALMOLOGY

## 2024-10-04 ASSESSMENT — REFRACTION_CURRENTRX
OS_VPRISM_DIRECTION: PROGS
OS_CYLINDER: -1.00
OD_AXIS: 083
OD_OVR_VA: 20/
OD_VPRISM_DIRECTION: PROGS
OS_SPHERE: +1.00
OS_ADD: +2.75
OD_CYLINDER: -1.75
OS_OVR_VA: 20/
OD_SPHERE: +1.00
OD_ADD: +2.75
OS_AXIS: 086

## 2024-10-04 ASSESSMENT — REFRACTION_MANIFEST
OD_VA1: 20/NI
OS_AXIS: 095
OS_SPHERE: +0.75
OS_VA1: 20/NI
OD_AXIS: 090
OD_CYLINDER: -1.50
OS_CYLINDER: -0.75
OD_SPHERE: +1.00

## 2024-10-04 ASSESSMENT — REFRACTION_AUTOREFRACTION
OS_CYLINDER: -0.75
OS_SPHERE: +0.75
OS_AXIS: 095
OD_SPHERE: +1.00
OD_AXIS: 088
OD_CYLINDER: -1.50

## 2024-10-04 ASSESSMENT — KERATOMETRY
OS_K1POWER_DIOPTERS: 42.50
OD_K1POWER_DIOPTERS: 42.25
METHOD_AUTO_MANUAL: AUTO
OS_AXISANGLE_DEGREES: 090
OD_K2POWER_DIOPTERS: 43.50
OS_K2POWER_DIOPTERS: 42.50
OD_AXISANGLE_DEGREES: 172

## 2024-10-04 ASSESSMENT — VISUAL ACUITY
OS_BCVA: 20/20-2
OD_BCVA: 20/20-1

## 2024-10-18 ENCOUNTER — OFFICE (OUTPATIENT)
Dept: URBAN - METROPOLITAN AREA CLINIC 12 | Facility: CLINIC | Age: 84
Setting detail: OPHTHALMOLOGY
End: 2024-10-18
Payer: MEDICARE

## 2024-10-18 DIAGNOSIS — H26.493: ICD-10-CM

## 2024-10-18 PROCEDURE — 99024 POSTOP FOLLOW-UP VISIT: CPT | Performed by: OPTOMETRIST

## 2024-10-18 ASSESSMENT — REFRACTION_AUTOREFRACTION
OS_SPHERE: +1.00
OD_CYLINDER: -1.75
OD_SPHERE: +1.25
OS_AXIS: 088
OD_AXIS: 091
OS_CYLINDER: -1.00

## 2024-10-18 ASSESSMENT — REFRACTION_CURRENTRX
OD_ADD: +2.75
OD_OVR_VA: 20/
OD_VPRISM_DIRECTION: PROGS
OS_AXIS: 086
OS_ADD: +2.75
OS_VPRISM_DIRECTION: PROGS
OD_AXIS: 083
OS_CYLINDER: -1.00
OS_SPHERE: +1.00
OD_SPHERE: +1.00
OD_CYLINDER: -1.75
OS_OVR_VA: 20/

## 2024-10-18 ASSESSMENT — TONOMETRY
OS_IOP_MMHG: 18
OD_IOP_MMHG: 15

## 2024-10-18 ASSESSMENT — REFRACTION_MANIFEST
OS_SPHERE: +0.75
OD_AXIS: 090
OS_AXIS: 095
OD_SPHERE: +1.00
OD_CYLINDER: -1.50
OS_CYLINDER: -0.75
OS_VA1: 20/NI
OD_VA1: 20/NI

## 2024-10-18 ASSESSMENT — KERATOMETRY
OD_K1POWER_DIOPTERS: 42.00
OS_K1POWER_DIOPTERS: 42.25
OS_K2POWER_DIOPTERS: 42.25
OS_AXISANGLE_DEGREES: 090
OD_K2POWER_DIOPTERS: 43.50
OD_AXISANGLE_DEGREES: 001
METHOD_AUTO_MANUAL: AUTO

## 2024-10-18 ASSESSMENT — VISUAL ACUITY
OD_BCVA: 20/20-3
OS_BCVA: 20/20-1

## 2024-10-18 ASSESSMENT — CONFRONTATIONAL VISUAL FIELD TEST (CVF)
OS_FINDINGS: FULL
OD_FINDINGS: FULL

## 2024-10-25 ENCOUNTER — APPOINTMENT (OUTPATIENT)
Dept: MRI IMAGING | Facility: CLINIC | Age: 84
End: 2024-10-25

## 2024-10-25 ENCOUNTER — OUTPATIENT (OUTPATIENT)
Dept: OUTPATIENT SERVICES | Facility: HOSPITAL | Age: 84
LOS: 1 days | End: 2024-10-25
Payer: MEDICARE

## 2024-10-25 DIAGNOSIS — Z98.890 OTHER SPECIFIED POSTPROCEDURAL STATES: Chronic | ICD-10-CM

## 2024-10-25 DIAGNOSIS — Z85.118 PERSONAL HISTORY OF OTHER MALIGNANT NEOPLASM OF BRONCHUS AND LUNG: Chronic | ICD-10-CM

## 2024-10-25 DIAGNOSIS — Z98.41 CATARACT EXTRACTION STATUS, RIGHT EYE: Chronic | ICD-10-CM

## 2024-10-25 DIAGNOSIS — Z87.19 PERSONAL HISTORY OF OTHER DISEASES OF THE DIGESTIVE SYSTEM: Chronic | ICD-10-CM

## 2024-10-25 DIAGNOSIS — Z90.49 ACQUIRED ABSENCE OF OTHER SPECIFIED PARTS OF DIGESTIVE TRACT: Chronic | ICD-10-CM

## 2024-10-25 DIAGNOSIS — Z01.89 ENCOUNTER FOR OTHER SPECIFIED SPECIAL EXAMINATIONS: Chronic | ICD-10-CM

## 2024-10-25 DIAGNOSIS — Z98.1 ARTHRODESIS STATUS: Chronic | ICD-10-CM

## 2024-10-25 DIAGNOSIS — I67.89 OTHER CEREBROVASCULAR DISEASE: ICD-10-CM

## 2024-10-25 DIAGNOSIS — Z90.89 ACQUIRED ABSENCE OF OTHER ORGANS: Chronic | ICD-10-CM

## 2024-10-25 DIAGNOSIS — Z41.9 ENCOUNTER FOR PROCEDURE FOR PURPOSES OTHER THAN REMEDYING HEALTH STATE, UNSPECIFIED: Chronic | ICD-10-CM

## 2024-10-25 DIAGNOSIS — S62.101A FRACTURE OF UNSPECIFIED CARPAL BONE, RIGHT WRIST, INITIAL ENCOUNTER FOR CLOSED FRACTURE: Chronic | ICD-10-CM

## 2024-10-25 DIAGNOSIS — C79.31 SECONDARY MALIGNANT NEOPLASM OF BRAIN: ICD-10-CM

## 2024-10-25 PROCEDURE — 70553 MRI BRAIN STEM W/O & W/DYE: CPT | Mod: 26,MH

## 2024-10-29 ENCOUNTER — APPOINTMENT (OUTPATIENT)
Dept: MRI IMAGING | Facility: CLINIC | Age: 84
End: 2024-10-29

## 2024-11-04 ENCOUNTER — NON-APPOINTMENT (OUTPATIENT)
Age: 84
End: 2024-11-04

## 2024-11-05 ENCOUNTER — OUTPATIENT (OUTPATIENT)
Dept: OUTPATIENT SERVICES | Facility: HOSPITAL | Age: 84
LOS: 1 days | Discharge: HOME | End: 2024-11-05

## 2024-11-05 ENCOUNTER — APPOINTMENT (OUTPATIENT)
Facility: CLINIC | Age: 84
End: 2024-11-05
Payer: MEDICARE

## 2024-11-05 ENCOUNTER — APPOINTMENT (OUTPATIENT)
Dept: CT IMAGING | Facility: CLINIC | Age: 84
End: 2024-11-05
Payer: MEDICARE

## 2024-11-05 ENCOUNTER — OUTPATIENT (OUTPATIENT)
Dept: OUTPATIENT SERVICES | Facility: HOSPITAL | Age: 84
LOS: 1 days | End: 2024-11-05
Payer: MEDICARE

## 2024-11-05 VITALS
BODY MASS INDEX: 28.28 KG/M2 | WEIGHT: 176 LBS | SYSTOLIC BLOOD PRESSURE: 116 MMHG | OXYGEN SATURATION: 97 % | DIASTOLIC BLOOD PRESSURE: 70 MMHG | HEART RATE: 91 BPM | TEMPERATURE: 98.2 F | HEIGHT: 66 IN

## 2024-11-05 DIAGNOSIS — C79.31 SECONDARY MALIGNANT NEOPLASM OF BRAIN: ICD-10-CM

## 2024-11-05 DIAGNOSIS — Z98.41 CATARACT EXTRACTION STATUS, RIGHT EYE: Chronic | ICD-10-CM

## 2024-11-05 DIAGNOSIS — Z98.890 OTHER SPECIFIED POSTPROCEDURAL STATES: Chronic | ICD-10-CM
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
## 2024-11-05 DIAGNOSIS — S62.101A FRACTURE OF UNSPECIFIED CARPAL BONE, RIGHT WRIST, INITIAL ENCOUNTER FOR CLOSED FRACTURE: Chronic | ICD-10-CM

## 2024-11-05 DIAGNOSIS — Z90.89 ACQUIRED ABSENCE OF OTHER ORGANS: Chronic | ICD-10-CM

## 2024-11-05 DIAGNOSIS — Z41.9 ENCOUNTER FOR PROCEDURE FOR PURPOSES OTHER THAN REMEDYING HEALTH STATE, UNSPECIFIED: Chronic | ICD-10-CM

## 2024-11-05 DIAGNOSIS — Z87.19 PERSONAL HISTORY OF OTHER DISEASES OF THE DIGESTIVE SYSTEM: Chronic | ICD-10-CM

## 2024-11-05 DIAGNOSIS — C15.9 MALIGNANT NEOPLASM OF ESOPHAGUS, UNSPECIFIED: ICD-10-CM

## 2024-11-05 DIAGNOSIS — Z90.49 ACQUIRED ABSENCE OF OTHER SPECIFIED PARTS OF DIGESTIVE TRACT: Chronic | ICD-10-CM

## 2024-11-05 DIAGNOSIS — Z01.89 ENCOUNTER FOR OTHER SPECIFIED SPECIAL EXAMINATIONS: Chronic | ICD-10-CM

## 2024-11-05 DIAGNOSIS — C78.00 SECONDARY MALIGNANT NEOPLASM OF UNSPECIFIED LUNG: ICD-10-CM

## 2024-11-05 DIAGNOSIS — Z85.118 PERSONAL HISTORY OF OTHER MALIGNANT NEOPLASM OF BRONCHUS AND LUNG: Chronic | ICD-10-CM

## 2024-11-05 DIAGNOSIS — Z98.1 ARTHRODESIS STATUS: Chronic | ICD-10-CM

## 2024-11-05 DIAGNOSIS — I67.89 OTHER CEREBROVASCULAR DISEASE: ICD-10-CM

## 2024-11-05 PROCEDURE — 99215 OFFICE O/P EST HI 40 MIN: CPT

## 2024-11-05 PROCEDURE — 71260 CT THORAX DX C+: CPT | Mod: 26,MH

## 2024-11-05 PROCEDURE — 74177 CT ABD & PELVIS W/CONTRAST: CPT | Mod: 26,MH

## 2024-11-06 ENCOUNTER — RESULT REVIEW (OUTPATIENT)
Age: 84
End: 2024-11-06

## 2024-11-07 ENCOUNTER — APPOINTMENT (OUTPATIENT)
Dept: HEMATOLOGY ONCOLOGY | Facility: CLINIC | Age: 84
End: 2024-11-07

## 2024-11-07 ENCOUNTER — RESULT REVIEW (OUTPATIENT)
Age: 84
End: 2024-11-07

## 2024-11-07 LAB
BASOPHILS # BLD AUTO: 0.04 K/UL — SIGNIFICANT CHANGE UP (ref 0–0.2)
BASOPHILS NFR BLD AUTO: 0.5 % — SIGNIFICANT CHANGE UP (ref 0–2)
EOSINOPHIL # BLD AUTO: 0.11 K/UL — SIGNIFICANT CHANGE UP (ref 0–0.5)
EOSINOPHIL NFR BLD AUTO: 1.5 % — SIGNIFICANT CHANGE UP (ref 0–6)
HCT VFR BLD CALC: 35.5 % — LOW (ref 39–50)
HGB BLD-MCNC: 12.5 G/DL — LOW (ref 13–17)
IMM GRANULOCYTES NFR BLD AUTO: 0.1 % — SIGNIFICANT CHANGE UP (ref 0–0.9)
LYMPHOCYTES # BLD AUTO: 1.38 K/UL — SIGNIFICANT CHANGE UP (ref 1–3.3)
LYMPHOCYTES # BLD AUTO: 18.2 % — SIGNIFICANT CHANGE UP (ref 13–44)
MCHC RBC-ENTMCNC: 32.9 PG — SIGNIFICANT CHANGE UP (ref 27–34)
MCHC RBC-ENTMCNC: 35.2 G/DL — SIGNIFICANT CHANGE UP (ref 32–36)
MCV RBC AUTO: 93.4 FL — SIGNIFICANT CHANGE UP (ref 80–100)
MONOCYTES # BLD AUTO: 0.8 K/UL — SIGNIFICANT CHANGE UP (ref 0–0.9)
MONOCYTES NFR BLD AUTO: 10.6 % — SIGNIFICANT CHANGE UP (ref 2–14)
NEUTROPHILS # BLD AUTO: 5.24 K/UL — SIGNIFICANT CHANGE UP (ref 1.8–7.4)
NEUTROPHILS NFR BLD AUTO: 69.1 % — SIGNIFICANT CHANGE UP (ref 43–77)
NRBC # BLD: 0 /100 WBCS — SIGNIFICANT CHANGE UP (ref 0–0)
NRBC BLD-RTO: 0 /100 WBCS — SIGNIFICANT CHANGE UP (ref 0–0)
PLATELET # BLD AUTO: 221 K/UL — SIGNIFICANT CHANGE UP (ref 150–400)
RBC # BLD: 3.8 M/UL — LOW (ref 4.2–5.8)
RBC # FLD: 13.1 % — SIGNIFICANT CHANGE UP (ref 10.3–14.5)
WBC # BLD: 7.58 K/UL — SIGNIFICANT CHANGE UP (ref 3.8–10.5)
WBC # FLD AUTO: 7.58 K/UL — SIGNIFICANT CHANGE UP (ref 3.8–10.5)

## 2024-11-08 LAB
ALBUMIN SERPL ELPH-MCNC: 4.1 G/DL
ALP BLD-CCNC: 76 U/L
ALT SERPL-CCNC: 17 U/L
ANION GAP SERPL CALC-SCNC: 14 MMOL/L
AST SERPL-CCNC: 20 U/L
BILIRUB SERPL-MCNC: 0.2 MG/DL
BUN SERPL-MCNC: 23 MG/DL
CALCIUM SERPL-MCNC: 9.9 MG/DL
CEA SERPL-MCNC: 2.2 NG/ML
CHLORIDE SERPL-SCNC: 101 MMOL/L
CO2 SERPL-SCNC: 24 MMOL/L
CREAT SERPL-MCNC: 1.32 MG/DL
EGFR: 53 ML/MIN/1.73M2
GLUCOSE SERPL-MCNC: 103 MG/DL
POTASSIUM SERPL-SCNC: 5.4 MMOL/L
PROT SERPL-MCNC: 6.7 G/DL
SODIUM SERPL-SCNC: 139 MMOL/L

## 2024-11-12 ENCOUNTER — NON-APPOINTMENT (OUTPATIENT)
Age: 84
End: 2024-11-12

## 2024-11-12 ENCOUNTER — APPOINTMENT (OUTPATIENT)
Dept: HEMATOLOGY ONCOLOGY | Facility: CLINIC | Age: 84
End: 2024-11-12
Payer: MEDICARE

## 2024-11-12 VITALS
SYSTOLIC BLOOD PRESSURE: 112 MMHG | WEIGHT: 175 LBS | HEART RATE: 85 BPM | HEIGHT: 66 IN | BODY MASS INDEX: 28.12 KG/M2 | DIASTOLIC BLOOD PRESSURE: 70 MMHG | TEMPERATURE: 97.9 F | OXYGEN SATURATION: 97 %

## 2024-11-12 DIAGNOSIS — C15.9 MALIGNANT NEOPLASM OF ESOPHAGUS, UNSPECIFIED: ICD-10-CM

## 2024-11-12 DIAGNOSIS — C79.31 SECONDARY MALIGNANT NEOPLASM OF BRAIN: ICD-10-CM

## 2024-11-12 PROCEDURE — 99214 OFFICE O/P EST MOD 30 MIN: CPT

## 2024-11-20 PROCEDURE — 74177 CT ABD & PELVIS W/CONTRAST: CPT

## 2024-11-20 PROCEDURE — 71260 CT THORAX DX C+: CPT

## 2024-11-20 PROCEDURE — A9585: CPT

## 2024-11-20 PROCEDURE — 70553 MRI BRAIN STEM W/O & W/DYE: CPT

## 2024-12-19 ENCOUNTER — APPOINTMENT (OUTPATIENT)
Dept: UROLOGY | Facility: CLINIC | Age: 84
End: 2024-12-19

## 2025-01-15 NOTE — ED STATDOCS - OBJECTIVE STATEMENT
Fever 79 y/o M with a PMHx of BPH, HLD, HTN, lung cancer,  presenting to the ED s/p fall. Patient reports that he slipped and injured his left knee and right shoulder in the fall. Denies hitting his head. No LOC. Patient presents today after he had difficulty standing and raising his right arm. Denies any CP, SOB, LE weakness, tingling, numbness, fever or chills.

## 2025-01-23 ENCOUNTER — APPOINTMENT (OUTPATIENT)
Dept: UROLOGY | Facility: CLINIC | Age: 85
End: 2025-01-23
Payer: MEDICARE

## 2025-01-23 VITALS
HEIGHT: 66 IN | SYSTOLIC BLOOD PRESSURE: 107 MMHG | HEART RATE: 90 BPM | RESPIRATION RATE: 16 BRPM | WEIGHT: 168 LBS | DIASTOLIC BLOOD PRESSURE: 69 MMHG | OXYGEN SATURATION: 99 % | BODY MASS INDEX: 27 KG/M2

## 2025-01-23 DIAGNOSIS — N40.1 BENIGN PROSTATIC HYPERPLASIA WITH LOWER URINARY TRACT SYMPMS: ICD-10-CM

## 2025-01-23 DIAGNOSIS — N13.8 BENIGN PROSTATIC HYPERPLASIA WITH LOWER URINARY TRACT SYMPMS: ICD-10-CM

## 2025-01-23 PROCEDURE — 99213 OFFICE O/P EST LOW 20 MIN: CPT

## 2025-02-10 NOTE — ASU PATIENT PROFILE, ADULT - MEDICATIONS BROUGHT TO HOSPITAL, PROFILE
Care Due:                  Date            Visit Type   Department     Provider  --------------------------------------------------------------------------------                                EP -                              [facility] FAMILY  Last Visit: 01-      CARE ([facility])   MEDICINE       Sonya Herndon                              [facility]  Next Visit: 02-      [facility] ([facility])   MEDICINE       Sonya Herndon                                                            Last  Test          Frequency    Reason                     Performed    Due Date  --------------------------------------------------------------------------------    CBC.........  12 months..  allopurinoL, fenofibrate.  Not Found    Overdue    CMP.........  12 months..  allopurinoL, famotidine,   Not Found    Overdue                             fenofibrate..............    [facility] Embedded Care Due Messages. Reference number: [id number].   2/10/2025 8:30:55 AM CST   no

## 2025-03-17 NOTE — H&P PST ADULT - PA/NP ONLY VISIT
How does the incision look? WNL    Do you have fever or chills? No    Are you having any pain? Yes initially    Is it controlled with your pain medication? Yes    What medications are you currently taking for pain control? Tylenol only a couple doses, has not needed it otherwise    Do you have a drain(s)? Yes-Wound vac    Are there any concerns with your drain? No, patient just reports he is looking forward to when this is removed by his other provider.    Verify post-op appointment date and time  [x]    Do you have any other questions or concerns? Denies    **NOTE TO TRIAGER: If patient requires further triage, based upon the answers above, move to appropriate triage protocol.     PA/NP only visit